# Patient Record
Sex: MALE | Race: WHITE | NOT HISPANIC OR LATINO
[De-identification: names, ages, dates, MRNs, and addresses within clinical notes are randomized per-mention and may not be internally consistent; named-entity substitution may affect disease eponyms.]

---

## 2017-09-24 ENCOUNTER — TRANSCRIPTION ENCOUNTER (OUTPATIENT)
Age: 27
End: 2017-09-24

## 2018-03-31 ENCOUNTER — EMERGENCY (EMERGENCY)
Facility: HOSPITAL | Age: 28
LOS: 1 days | Discharge: HOME | End: 2018-03-31
Attending: EMERGENCY MEDICINE
Payer: SELF-PAY

## 2018-03-31 VITALS
HEART RATE: 85 BPM | OXYGEN SATURATION: 99 % | SYSTOLIC BLOOD PRESSURE: 137 MMHG | WEIGHT: 220.02 LBS | TEMPERATURE: 98 F | HEIGHT: 73 IN | DIASTOLIC BLOOD PRESSURE: 83 MMHG | RESPIRATION RATE: 16 BRPM

## 2018-03-31 PROCEDURE — 73000 X-RAY EXAM OF COLLAR BONE: CPT | Mod: 26,RT

## 2018-03-31 PROCEDURE — 73070 X-RAY EXAM OF ELBOW: CPT | Mod: 26,RT

## 2018-03-31 PROCEDURE — 73030 X-RAY EXAM OF SHOULDER: CPT

## 2018-03-31 PROCEDURE — 73130 X-RAY EXAM OF HAND: CPT | Mod: 26,RT

## 2018-03-31 PROCEDURE — 99284 EMERGENCY DEPT VISIT MOD MDM: CPT | Mod: 25

## 2018-03-31 PROCEDURE — 73110 X-RAY EXAM OF WRIST: CPT | Mod: 26,RT

## 2018-03-31 PROCEDURE — 73110 X-RAY EXAM OF WRIST: CPT

## 2018-03-31 PROCEDURE — 73130 X-RAY EXAM OF HAND: CPT

## 2018-03-31 PROCEDURE — 99053 MED SERV 10PM-8AM 24 HR FAC: CPT

## 2018-03-31 PROCEDURE — 73030 X-RAY EXAM OF SHOULDER: CPT | Mod: 26,RT

## 2018-03-31 PROCEDURE — 99283 EMERGENCY DEPT VISIT LOW MDM: CPT | Mod: 25

## 2018-03-31 PROCEDURE — 73000 X-RAY EXAM OF COLLAR BONE: CPT

## 2018-03-31 PROCEDURE — 73070 X-RAY EXAM OF ELBOW: CPT

## 2018-03-31 RX ORDER — OXYCODONE AND ACETAMINOPHEN 5; 325 MG/1; MG/1
1 TABLET ORAL ONCE
Qty: 0 | Refills: 0 | Status: DISCONTINUED | OUTPATIENT
Start: 2018-03-31 | End: 2018-03-31

## 2018-03-31 RX ADMIN — OXYCODONE AND ACETAMINOPHEN 1 TABLET(S): 5; 325 TABLET ORAL at 23:16

## 2018-03-31 RX ADMIN — OXYCODONE AND ACETAMINOPHEN 1 TABLET(S): 5; 325 TABLET ORAL at 23:19

## 2018-03-31 NOTE — ED ADULT TRIAGE NOTE - CHIEF COMPLAINT QUOTE
c/o rt collar bone/wrist/ pain and rt. hand swelling s/p fall tonight 2 hrs ago  pt slipped and fell pt  denies any LOC

## 2018-04-01 VITALS
RESPIRATION RATE: 17 BRPM | DIASTOLIC BLOOD PRESSURE: 85 MMHG | SYSTOLIC BLOOD PRESSURE: 137 MMHG | OXYGEN SATURATION: 99 % | HEART RATE: 88 BPM

## 2018-04-01 NOTE — ED PROVIDER NOTE - PHYSICAL EXAMINATION
echymosis over right lateral clavicle. mild swelling to hand. tenderness to palpation over clavicle shoulder elbow wrist and hand. 2+ DP pulse. good capillary refill. no rib tenderness to palpation. no cervical spinal tenderness to palpation. patient reports subjective tingling to entire hand

## 2018-04-01 NOTE — ED PROVIDER NOTE - OBJECTIVE STATEMENT
patient slipped and fell 3 hours prior to Emergency Department arrival to hit the corner or the wall with his right side, especially the clavicle. had some pain initially, then after several hours pain got much worse and he noticed swelling to hand. now entire right side hurts. took advil prior to Emergency Department arrival.

## 2018-04-01 NOTE — ED PROVIDER NOTE - CARE PLAN
Principal Discharge DX:	Shoulder contusion  Secondary Diagnosis:	Clavicle pain  Secondary Diagnosis:	Hand swelling

## 2018-04-01 NOTE — ED PROVIDER NOTE - MEDICAL DECISION MAKING DETAILS
patient with fall with right sided pain. Xrays appear normal. images reviewed with radiology who did not see any fx or dislocation. home with shoulder sling, orthopedics followup if pain continues

## 2018-04-05 ENCOUNTER — APPOINTMENT (OUTPATIENT)
Dept: ORTHOPEDIC SURGERY | Facility: CLINIC | Age: 28
End: 2018-04-05
Payer: COMMERCIAL

## 2018-04-05 ENCOUNTER — TRANSCRIPTION ENCOUNTER (OUTPATIENT)
Age: 28
End: 2018-04-05

## 2018-04-05 VITALS — SYSTOLIC BLOOD PRESSURE: 111 MMHG | HEART RATE: 84 BPM | DIASTOLIC BLOOD PRESSURE: 71 MMHG

## 2018-04-05 VITALS — WEIGHT: 225 LBS | BODY MASS INDEX: 29.82 KG/M2 | HEIGHT: 73 IN

## 2018-04-05 DIAGNOSIS — Z78.9 OTHER SPECIFIED HEALTH STATUS: ICD-10-CM

## 2018-04-05 DIAGNOSIS — S49.91XA UNSPECIFIED INJURY OF RIGHT SHOULDER AND UPPER ARM, INITIAL ENCOUNTER: ICD-10-CM

## 2018-04-05 PROCEDURE — 99204 OFFICE O/P NEW MOD 45 MIN: CPT

## 2018-04-05 RX ORDER — DICLOFENAC SODIUM 75 MG/1
75 TABLET, DELAYED RELEASE ORAL
Qty: 60 | Refills: 0 | Status: ACTIVE | COMMUNITY
Start: 2018-04-05 | End: 1900-01-01

## 2018-04-19 ENCOUNTER — APPOINTMENT (OUTPATIENT)
Dept: ORTHOPEDIC SURGERY | Facility: CLINIC | Age: 28
End: 2018-04-19
Payer: COMMERCIAL

## 2018-04-19 VITALS
DIASTOLIC BLOOD PRESSURE: 85 MMHG | HEIGHT: 74 IN | HEART RATE: 108 BPM | WEIGHT: 225 LBS | SYSTOLIC BLOOD PRESSURE: 132 MMHG | BODY MASS INDEX: 28.88 KG/M2

## 2018-04-19 DIAGNOSIS — S49.91XD UNSPECIFIED INJURY OF RIGHT SHOULDER AND UPPER ARM, SUBSEQUENT ENCOUNTER: ICD-10-CM

## 2018-04-19 PROCEDURE — 99213 OFFICE O/P EST LOW 20 MIN: CPT

## 2018-04-19 RX ORDER — AMOXICILLIN AND CLAVULANATE POTASSIUM 500; 125 MG/1; MG/1
500-125 TABLET, FILM COATED ORAL
Qty: 21 | Refills: 0 | Status: ACTIVE | COMMUNITY
Start: 2017-12-27

## 2018-04-19 RX ORDER — IBUPROFEN 600 MG/1
600 TABLET, FILM COATED ORAL
Qty: 21 | Refills: 0 | Status: ACTIVE | COMMUNITY
Start: 2018-01-08

## 2018-04-19 RX ORDER — CHLORHEXIDINE GLUCONATE, 0.12% ORAL RINSE 1.2 MG/ML
0.12 SOLUTION DENTAL
Qty: 473 | Refills: 0 | Status: ACTIVE | COMMUNITY
Start: 2017-12-27

## 2018-04-19 RX ORDER — AMOXICILLIN 875 MG/1
875 TABLET, FILM COATED ORAL
Qty: 14 | Refills: 0 | Status: ACTIVE | COMMUNITY
Start: 2018-01-08

## 2018-05-10 ENCOUNTER — APPOINTMENT (OUTPATIENT)
Dept: ORTHOPEDIC SURGERY | Facility: CLINIC | Age: 28
End: 2018-05-10

## 2018-08-30 ENCOUNTER — TRANSCRIPTION ENCOUNTER (OUTPATIENT)
Age: 28
End: 2018-08-30

## 2020-04-30 NOTE — ED ADULT NURSE NOTE - NSSISCREENINGQ1_ED_A_ED
"Patient  agreed to switch to a video visit and to also send in photos beforehand.    Spoke to Maria C and reviewed the following info:    Upcoming Dermatology Visit Information       Due to the coronavirus pandemic, we are limiting all our non life threatening appointments to either telephone or video visits    These photos will be seen by an MD or CAROLINA and will need to be taken with 72 hours of your visit    Photos that you send in do need to include: date and time taken, area of body, and physical address (ex. Home address, for insurance purposes)    This will be billed to you and your insurance.    A teledermatology visit is not as thorough as an in-person visit and the quality of the photograph sent may not be of the same quality as that taken by the dermatology clinic.    You may receive a call from the clinic to review additional history and you may still be instructed to come to clinic even after photo review and be billed for both visits.    Please, seek emergency care if you have skin pain, fever, or sores in the mouth, nose, eyes, or genitals by dialing 911 or visiting an emergency room    Final photo assessment can take up to approximately 72 hours    Please, contact your clinic if you have not heard from us within 72 hours of your appointment time    Video visit requires: smart phone ( have to download \"AW Touchpoint\" misty. Or Via Computer/Laptop that has a microphone, camera, and speaker (Doctor will send link via e-mail)    For video visits, to receive an invitation and connect with your provider, the [facility] video visit technology must be accessible from your smartphone or personal device. Please click the link below for setup instructions:  ?  [email or web address]    Either option would require a nurse to call you approximately 30 min before your appointment to ask questions like medications, allergies, and pharmacy.         Who should I call with questions?    [facility], " [place]: [phone]    If this is a medical emergency and you are unable to reach an ER, Call 911     Zena Greenberg, EMT      No

## 2020-12-10 ENCOUNTER — TRANSCRIPTION ENCOUNTER (OUTPATIENT)
Age: 30
End: 2020-12-10

## 2020-12-11 ENCOUNTER — EMERGENCY (EMERGENCY)
Facility: HOSPITAL | Age: 30
LOS: 1 days | Discharge: HOME | End: 2020-12-11
Attending: STUDENT IN AN ORGANIZED HEALTH CARE EDUCATION/TRAINING PROGRAM
Payer: COMMERCIAL

## 2020-12-11 ENCOUNTER — TRANSCRIPTION ENCOUNTER (OUTPATIENT)
Age: 30
End: 2020-12-11

## 2020-12-11 VITALS
TEMPERATURE: 98 F | HEART RATE: 103 BPM | OXYGEN SATURATION: 95 % | DIASTOLIC BLOOD PRESSURE: 75 MMHG | SYSTOLIC BLOOD PRESSURE: 121 MMHG | RESPIRATION RATE: 20 BRPM

## 2020-12-11 VITALS
WEIGHT: 235.01 LBS | HEART RATE: 117 BPM | SYSTOLIC BLOOD PRESSURE: 130 MMHG | DIASTOLIC BLOOD PRESSURE: 78 MMHG | TEMPERATURE: 100 F | HEIGHT: 73 IN | OXYGEN SATURATION: 97 % | RESPIRATION RATE: 22 BRPM

## 2020-12-11 LAB
ALBUMIN SERPL ELPH-MCNC: 3.9 G/DL — SIGNIFICANT CHANGE UP (ref 3.5–5)
ALP SERPL-CCNC: 89 U/L — SIGNIFICANT CHANGE UP (ref 40–120)
ALT FLD-CCNC: 76 U/L DA — HIGH (ref 10–60)
ANION GAP SERPL CALC-SCNC: 8 MMOL/L — SIGNIFICANT CHANGE UP (ref 5–17)
APTT BLD: 35.7 SEC — HIGH (ref 27.5–35.5)
AST SERPL-CCNC: 40 U/L — SIGNIFICANT CHANGE UP (ref 10–40)
BASOPHILS # BLD AUTO: 0.02 K/UL — SIGNIFICANT CHANGE UP (ref 0–0.2)
BASOPHILS NFR BLD AUTO: 0.5 % — SIGNIFICANT CHANGE UP (ref 0–2)
BILIRUB SERPL-MCNC: 0.6 MG/DL — SIGNIFICANT CHANGE UP (ref 0.2–1.2)
BUN SERPL-MCNC: 14 MG/DL — SIGNIFICANT CHANGE UP (ref 7–18)
CALCIUM SERPL-MCNC: 9.6 MG/DL — SIGNIFICANT CHANGE UP (ref 8.4–10.5)
CHLORIDE SERPL-SCNC: 106 MMOL/L — SIGNIFICANT CHANGE UP (ref 96–108)
CO2 SERPL-SCNC: 24 MMOL/L — SIGNIFICANT CHANGE UP (ref 22–31)
CREAT SERPL-MCNC: 0.88 MG/DL — SIGNIFICANT CHANGE UP (ref 0.5–1.3)
D DIMER BLD IA.RAPID-MCNC: <150 NG/ML DDU — SIGNIFICANT CHANGE UP
EOSINOPHIL # BLD AUTO: 0.07 K/UL — SIGNIFICANT CHANGE UP (ref 0–0.5)
EOSINOPHIL NFR BLD AUTO: 1.7 % — SIGNIFICANT CHANGE UP (ref 0–6)
GLUCOSE SERPL-MCNC: 103 MG/DL — HIGH (ref 70–99)
HCT VFR BLD CALC: 45.4 % — SIGNIFICANT CHANGE UP (ref 39–50)
HGB BLD-MCNC: 15.3 G/DL — SIGNIFICANT CHANGE UP (ref 13–17)
IMM GRANULOCYTES NFR BLD AUTO: 0.5 % — SIGNIFICANT CHANGE UP (ref 0–1.5)
INR BLD: 1.08 RATIO — SIGNIFICANT CHANGE UP (ref 0.88–1.16)
LYMPHOCYTES # BLD AUTO: 1.16 K/UL — SIGNIFICANT CHANGE UP (ref 1–3.3)
LYMPHOCYTES # BLD AUTO: 27.4 % — SIGNIFICANT CHANGE UP (ref 13–44)
MCHC RBC-ENTMCNC: 28.3 PG — SIGNIFICANT CHANGE UP (ref 27–34)
MCHC RBC-ENTMCNC: 33.7 GM/DL — SIGNIFICANT CHANGE UP (ref 32–36)
MCV RBC AUTO: 83.9 FL — SIGNIFICANT CHANGE UP (ref 80–100)
MONOCYTES # BLD AUTO: 0.38 K/UL — SIGNIFICANT CHANGE UP (ref 0–0.9)
MONOCYTES NFR BLD AUTO: 9 % — SIGNIFICANT CHANGE UP (ref 2–14)
NEUTROPHILS # BLD AUTO: 2.58 K/UL — SIGNIFICANT CHANGE UP (ref 1.8–7.4)
NEUTROPHILS NFR BLD AUTO: 60.9 % — SIGNIFICANT CHANGE UP (ref 43–77)
NRBC # BLD: 0 /100 WBCS — SIGNIFICANT CHANGE UP (ref 0–0)
PLATELET # BLD AUTO: 231 K/UL — SIGNIFICANT CHANGE UP (ref 150–400)
POTASSIUM SERPL-MCNC: 3.9 MMOL/L — SIGNIFICANT CHANGE UP (ref 3.5–5.3)
POTASSIUM SERPL-SCNC: 3.9 MMOL/L — SIGNIFICANT CHANGE UP (ref 3.5–5.3)
PROT SERPL-MCNC: 8.1 G/DL — SIGNIFICANT CHANGE UP (ref 6–8.3)
PROTHROM AB SERPL-ACNC: 12.8 SEC — SIGNIFICANT CHANGE UP (ref 10.6–13.6)
RBC # BLD: 5.41 M/UL — SIGNIFICANT CHANGE UP (ref 4.2–5.8)
RBC # FLD: 13.5 % — SIGNIFICANT CHANGE UP (ref 10.3–14.5)
SODIUM SERPL-SCNC: 138 MMOL/L — SIGNIFICANT CHANGE UP (ref 135–145)
TROPONIN I SERPL-MCNC: <0.015 NG/ML — SIGNIFICANT CHANGE UP (ref 0–0.04)
WBC # BLD: 4.23 K/UL — SIGNIFICANT CHANGE UP (ref 3.8–10.5)
WBC # FLD AUTO: 4.23 K/UL — SIGNIFICANT CHANGE UP (ref 3.8–10.5)

## 2020-12-11 PROCEDURE — 85025 COMPLETE CBC W/AUTO DIFF WBC: CPT

## 2020-12-11 PROCEDURE — 80053 COMPREHEN METABOLIC PANEL: CPT

## 2020-12-11 PROCEDURE — 93005 ELECTROCARDIOGRAM TRACING: CPT

## 2020-12-11 PROCEDURE — 99284 EMERGENCY DEPT VISIT MOD MDM: CPT

## 2020-12-11 PROCEDURE — 99284 EMERGENCY DEPT VISIT MOD MDM: CPT | Mod: 25

## 2020-12-11 PROCEDURE — 71045 X-RAY EXAM CHEST 1 VIEW: CPT | Mod: 26

## 2020-12-11 PROCEDURE — 36415 COLL VENOUS BLD VENIPUNCTURE: CPT

## 2020-12-11 PROCEDURE — 85730 THROMBOPLASTIN TIME PARTIAL: CPT

## 2020-12-11 PROCEDURE — 85379 FIBRIN DEGRADATION QUANT: CPT

## 2020-12-11 PROCEDURE — 96375 TX/PRO/DX INJ NEW DRUG ADDON: CPT

## 2020-12-11 PROCEDURE — 84484 ASSAY OF TROPONIN QUANT: CPT

## 2020-12-11 PROCEDURE — 93010 ELECTROCARDIOGRAM REPORT: CPT

## 2020-12-11 PROCEDURE — 71045 X-RAY EXAM CHEST 1 VIEW: CPT

## 2020-12-11 PROCEDURE — 85610 PROTHROMBIN TIME: CPT

## 2020-12-11 PROCEDURE — 96374 THER/PROPH/DIAG INJ IV PUSH: CPT

## 2020-12-11 RX ORDER — DEXAMETHASONE 0.5 MG/5ML
10 ELIXIR ORAL ONCE
Refills: 0 | Status: COMPLETED | OUTPATIENT
Start: 2020-12-11 | End: 2020-12-11

## 2020-12-11 RX ORDER — IBUPROFEN 200 MG
1 TABLET ORAL
Qty: 20 | Refills: 0
Start: 2020-12-11

## 2020-12-11 RX ORDER — ACETAMINOPHEN 500 MG
650 TABLET ORAL ONCE
Refills: 0 | Status: COMPLETED | OUTPATIENT
Start: 2020-12-11 | End: 2020-12-11

## 2020-12-11 RX ORDER — ACETAMINOPHEN 500 MG
2 TABLET ORAL
Qty: 40 | Refills: 0
Start: 2020-12-11

## 2020-12-11 RX ORDER — KETOROLAC TROMETHAMINE 30 MG/ML
30 SYRINGE (ML) INJECTION ONCE
Refills: 0 | Status: DISCONTINUED | OUTPATIENT
Start: 2020-12-11 | End: 2020-12-11

## 2020-12-11 RX ADMIN — Medication 650 MILLIGRAM(S): at 18:15

## 2020-12-11 RX ADMIN — Medication 30 MILLIGRAM(S): at 18:15

## 2020-12-11 RX ADMIN — Medication 650 MILLIGRAM(S): at 19:11

## 2020-12-11 RX ADMIN — Medication 10 MILLIGRAM(S): at 19:25

## 2020-12-11 RX ADMIN — Medication 30 MILLIGRAM(S): at 19:11

## 2020-12-11 NOTE — ED PROVIDER NOTE - PROGRESS NOTE DETAILS
patient lab wnl. dimer negative. cxr reviewed, no ptx noted, lung changes consistent with covid. given med, return precaution and instructed to f.u pmd

## 2020-12-11 NOTE — ED PROVIDER NOTE - CLINICAL SUMMARY MEDICAL DECISION MAKING FREE TEXT BOX
Patient presenting for cough, fever, body ache, also endorses cp and sob. vital sig for tachycardia. has known covid infection. will obtain lab, cxr, pain med. assess for acs, pe, ptx, ed obs and reassess

## 2020-12-11 NOTE — ED PROVIDER NOTE - PATIENT PORTAL LINK FT
You can access the FollowMyHealth Patient Portal offered by [facility] by registering at the following website: [email or web address]. By joining [facility]’s FollowMyHealth portal, you will also be able to view your health information using other applications (apps) compatible with our system.

## 2020-12-11 NOTE — ED ADULT NURSE NOTE - OBJECTIVE STATEMENT
As per pt, c/o sharp generalized CP radiating to the back with SOB, f/c, cough, H/A, dizziness, blurred vision, lightheadedness x4 days. Pt admits to testing +covid 12/11/2020 at .

## 2020-12-11 NOTE — ED PROVIDER NOTE - OBJECTIVE STATEMENT
31 y/o M patient with no significant PMHx and no significant PSHx presents to ED with c/o 5d of fever, cough, chest pain, body aches, and shortness of breath. Patient states being a tested for COVID recently and was found to be COVID positive yesterday. Patient denies any nausea, vomiting or any other complains. Patient reports that the symptoms have been intermittently. Patient states taking Dayquil, Nyquil, and tylenol for the symptoms.

## 2020-12-17 ENCOUNTER — INPATIENT (INPATIENT)
Facility: HOSPITAL | Age: 30
LOS: 8 days | Discharge: HOME | DRG: 177 | End: 2020-12-26
Attending: INTERNAL MEDICINE | Admitting: INTERNAL MEDICINE
Payer: COMMERCIAL

## 2020-12-17 VITALS
WEIGHT: 235.01 LBS | RESPIRATION RATE: 16 BRPM | DIASTOLIC BLOOD PRESSURE: 86 MMHG | SYSTOLIC BLOOD PRESSURE: 132 MMHG | HEART RATE: 77 BPM | HEIGHT: 73 IN | TEMPERATURE: 98 F | OXYGEN SATURATION: 95 %

## 2020-12-17 DIAGNOSIS — R07.9 CHEST PAIN, UNSPECIFIED: ICD-10-CM

## 2020-12-17 DIAGNOSIS — U07.1 COVID-19: ICD-10-CM

## 2020-12-17 DIAGNOSIS — K92.1 MELENA: ICD-10-CM

## 2020-12-17 DIAGNOSIS — Z29.9 ENCOUNTER FOR PROPHYLACTIC MEASURES, UNSPECIFIED: ICD-10-CM

## 2020-12-17 PROBLEM — Z78.9 OTHER SPECIFIED HEALTH STATUS: Chronic | Status: ACTIVE | Noted: 2020-12-11

## 2020-12-17 LAB
ALBUMIN SERPL ELPH-MCNC: 3.6 G/DL — SIGNIFICANT CHANGE UP (ref 3.5–5)
ALP SERPL-CCNC: 81 U/L — SIGNIFICANT CHANGE UP (ref 40–120)
ALT FLD-CCNC: 66 U/L DA — HIGH (ref 10–60)
ANION GAP SERPL CALC-SCNC: 8 MMOL/L — SIGNIFICANT CHANGE UP (ref 5–17)
AST SERPL-CCNC: 32 U/L — SIGNIFICANT CHANGE UP (ref 10–40)
BASOPHILS # BLD AUTO: 0.01 K/UL — SIGNIFICANT CHANGE UP (ref 0–0.2)
BASOPHILS NFR BLD AUTO: 0.1 % — SIGNIFICANT CHANGE UP (ref 0–2)
BILIRUB SERPL-MCNC: 0.6 MG/DL — SIGNIFICANT CHANGE UP (ref 0.2–1.2)
BUN SERPL-MCNC: 12 MG/DL — SIGNIFICANT CHANGE UP (ref 7–18)
CALCIUM SERPL-MCNC: 9.5 MG/DL — SIGNIFICANT CHANGE UP (ref 8.4–10.5)
CHLORIDE SERPL-SCNC: 107 MMOL/L — SIGNIFICANT CHANGE UP (ref 96–108)
CO2 SERPL-SCNC: 25 MMOL/L — SIGNIFICANT CHANGE UP (ref 22–31)
CREAT SERPL-MCNC: 0.9 MG/DL — SIGNIFICANT CHANGE UP (ref 0.5–1.3)
D DIMER BLD IA.RAPID-MCNC: <150 NG/ML DDU — SIGNIFICANT CHANGE UP
EOSINOPHIL # BLD AUTO: 0.15 K/UL — SIGNIFICANT CHANGE UP (ref 0–0.5)
EOSINOPHIL NFR BLD AUTO: 2 % — SIGNIFICANT CHANGE UP (ref 0–6)
FERRITIN SERPL-MCNC: 811 NG/ML — HIGH (ref 30–400)
GLUCOSE SERPL-MCNC: 117 MG/DL — HIGH (ref 70–99)
HCT VFR BLD CALC: 43.9 % — SIGNIFICANT CHANGE UP (ref 39–50)
HGB BLD-MCNC: 14.7 G/DL — SIGNIFICANT CHANGE UP (ref 13–17)
IMM GRANULOCYTES NFR BLD AUTO: 0.4 % — SIGNIFICANT CHANGE UP (ref 0–1.5)
LYMPHOCYTES # BLD AUTO: 2.34 K/UL — SIGNIFICANT CHANGE UP (ref 1–3.3)
LYMPHOCYTES # BLD AUTO: 31.2 % — SIGNIFICANT CHANGE UP (ref 13–44)
MCHC RBC-ENTMCNC: 28.3 PG — SIGNIFICANT CHANGE UP (ref 27–34)
MCHC RBC-ENTMCNC: 33.5 GM/DL — SIGNIFICANT CHANGE UP (ref 32–36)
MCV RBC AUTO: 84.6 FL — SIGNIFICANT CHANGE UP (ref 80–100)
MONOCYTES # BLD AUTO: 0.46 K/UL — SIGNIFICANT CHANGE UP (ref 0–0.9)
MONOCYTES NFR BLD AUTO: 6.1 % — SIGNIFICANT CHANGE UP (ref 2–14)
NEUTROPHILS # BLD AUTO: 4.51 K/UL — SIGNIFICANT CHANGE UP (ref 1.8–7.4)
NEUTROPHILS NFR BLD AUTO: 60.2 % — SIGNIFICANT CHANGE UP (ref 43–77)
NRBC # BLD: 0 /100 WBCS — SIGNIFICANT CHANGE UP (ref 0–0)
PLATELET # BLD AUTO: 291 K/UL — SIGNIFICANT CHANGE UP (ref 150–400)
POTASSIUM SERPL-MCNC: 3.9 MMOL/L — SIGNIFICANT CHANGE UP (ref 3.5–5.3)
POTASSIUM SERPL-SCNC: 3.9 MMOL/L — SIGNIFICANT CHANGE UP (ref 3.5–5.3)
PROT SERPL-MCNC: 7.8 G/DL — SIGNIFICANT CHANGE UP (ref 6–8.3)
RAPID RVP RESULT: DETECTED
RBC # BLD: 5.19 M/UL — SIGNIFICANT CHANGE UP (ref 4.2–5.8)
RBC # FLD: 13 % — SIGNIFICANT CHANGE UP (ref 10.3–14.5)
SARS-COV-2 RNA SPEC QL NAA+PROBE: DETECTED
SODIUM SERPL-SCNC: 140 MMOL/L — SIGNIFICANT CHANGE UP (ref 135–145)
TROPONIN I SERPL-MCNC: <0.015 NG/ML — SIGNIFICANT CHANGE UP (ref 0–0.04)
TROPONIN I SERPL-MCNC: <0.015 NG/ML — SIGNIFICANT CHANGE UP (ref 0–0.04)
WBC # BLD: 7.5 K/UL — SIGNIFICANT CHANGE UP (ref 3.8–10.5)
WBC # FLD AUTO: 7.5 K/UL — SIGNIFICANT CHANGE UP (ref 3.8–10.5)

## 2020-12-17 PROCEDURE — 71045 X-RAY EXAM CHEST 1 VIEW: CPT | Mod: 26

## 2020-12-17 PROCEDURE — 93010 ELECTROCARDIOGRAM REPORT: CPT | Mod: 76

## 2020-12-17 PROCEDURE — 99285 EMERGENCY DEPT VISIT HI MDM: CPT

## 2020-12-17 RX ORDER — ENOXAPARIN SODIUM 100 MG/ML
40 INJECTION SUBCUTANEOUS DAILY
Refills: 0 | Status: DISCONTINUED | OUTPATIENT
Start: 2020-12-17 | End: 2020-12-26

## 2020-12-17 RX ORDER — IPRATROPIUM/ALBUTEROL SULFATE 18-103MCG
3 AEROSOL WITH ADAPTER (GRAM) INHALATION ONCE
Refills: 0 | Status: COMPLETED | OUTPATIENT
Start: 2020-12-17 | End: 2020-12-17

## 2020-12-17 RX ORDER — KETOROLAC TROMETHAMINE 30 MG/ML
30 SYRINGE (ML) INJECTION ONCE
Refills: 0 | Status: DISCONTINUED | OUTPATIENT
Start: 2020-12-17 | End: 2020-12-17

## 2020-12-17 RX ORDER — MONTELUKAST 4 MG/1
10 TABLET, CHEWABLE ORAL EVERY 24 HOURS
Refills: 0 | Status: DISCONTINUED | OUTPATIENT
Start: 2020-12-17 | End: 2020-12-23

## 2020-12-17 RX ORDER — CHOLECALCIFEROL (VITAMIN D3) 125 MCG
1000 CAPSULE ORAL DAILY
Refills: 0 | Status: DISCONTINUED | OUTPATIENT
Start: 2020-12-17 | End: 2020-12-26

## 2020-12-17 RX ORDER — ZINC SULFATE TAB 220 MG (50 MG ZINC EQUIVALENT) 220 (50 ZN) MG
220 TAB ORAL DAILY
Refills: 0 | Status: DISCONTINUED | OUTPATIENT
Start: 2020-12-17 | End: 2020-12-26

## 2020-12-17 RX ORDER — FAMOTIDINE 10 MG/ML
40 INJECTION INTRAVENOUS ONCE
Refills: 0 | Status: COMPLETED | OUTPATIENT
Start: 2020-12-17 | End: 2020-12-17

## 2020-12-17 RX ORDER — AZITHROMYCIN 500 MG/1
TABLET, FILM COATED ORAL
Refills: 0 | Status: DISCONTINUED | OUTPATIENT
Start: 2020-12-17 | End: 2020-12-19

## 2020-12-17 RX ORDER — ASCORBIC ACID 60 MG
1000 TABLET,CHEWABLE ORAL DAILY
Refills: 0 | Status: DISCONTINUED | OUTPATIENT
Start: 2020-12-17 | End: 2020-12-26

## 2020-12-17 RX ORDER — AZITHROMYCIN 500 MG/1
500 TABLET, FILM COATED ORAL EVERY 24 HOURS
Refills: 0 | Status: DISCONTINUED | OUTPATIENT
Start: 2020-12-18 | End: 2020-12-19

## 2020-12-17 RX ORDER — AZITHROMYCIN 500 MG/1
500 TABLET, FILM COATED ORAL ONCE
Refills: 0 | Status: COMPLETED | OUTPATIENT
Start: 2020-12-17 | End: 2020-12-17

## 2020-12-17 RX ORDER — ASCORBIC ACID 60 MG
500 TABLET,CHEWABLE ORAL DAILY
Refills: 0 | Status: DISCONTINUED | OUTPATIENT
Start: 2020-12-17 | End: 2020-12-17

## 2020-12-17 RX ORDER — FAMOTIDINE 10 MG/ML
40 INJECTION INTRAVENOUS
Refills: 0 | Status: DISCONTINUED | OUTPATIENT
Start: 2020-12-17 | End: 2020-12-19

## 2020-12-17 RX ORDER — ALBUTEROL 90 UG/1
2 AEROSOL, METERED ORAL EVERY 6 HOURS
Refills: 0 | Status: DISCONTINUED | OUTPATIENT
Start: 2020-12-17 | End: 2020-12-26

## 2020-12-17 RX ADMIN — Medication 1000 MILLIGRAM(S): at 20:27

## 2020-12-17 RX ADMIN — Medication 30 MILLIGRAM(S): at 14:28

## 2020-12-17 RX ADMIN — Medication 3 MILLILITER(S): at 14:28

## 2020-12-17 RX ADMIN — Medication 100 MILLIGRAM(S): at 19:49

## 2020-12-17 RX ADMIN — MONTELUKAST 10 MILLIGRAM(S): 4 TABLET, CHEWABLE ORAL at 20:27

## 2020-12-17 RX ADMIN — FAMOTIDINE 40 MILLIGRAM(S): 10 INJECTION INTRAVENOUS at 20:26

## 2020-12-17 RX ADMIN — AZITHROMYCIN 255 MILLIGRAM(S): 500 TABLET, FILM COATED ORAL at 20:26

## 2020-12-17 NOTE — ED PROVIDER NOTE - CARE PLAN
Principal Discharge DX:	COVID-19  Secondary Diagnosis:	SOB (shortness of breath) on exertion  Secondary Diagnosis:	Hemoptysis  Secondary Diagnosis:	Blood in stool  Secondary Diagnosis:	Chest pain

## 2020-12-17 NOTE — H&P ADULT - PROBLEM SELECTOR PLAN 4
RISK                                                          Points  [] Previous VTE                                           3  [] Thrombophilia                                        2  [] Lower limb paralysis                              2   [] Current Cancer                                       2   [x] Immobilization > 24 hrs                        1  [] ICU/CCU stay > 24 hours                       1  [ ] Age > 60                                                   1  Improve Score 1  Lovenox 40mg SQ

## 2020-12-17 NOTE — H&P ADULT - PROBLEM SELECTOR PLAN 2
Patient reports hematochezia sicne 1 day  - PRevious history of blood in stools years ago and he was found to have a polyp  - Also reports Hematemesis   - Epigastric pain with eating anf vomiting   - HnH stable,  -Patient has been taking Ibuprofen 600mg Q4 for last 1 week   - No melena  - Will monitor for now, Start on protonix   - Avoid NSAIDs   - GI consult Dr Murray

## 2020-12-17 NOTE — CONSULT NOTE ADULT - ASSESSMENT
Patient is a 30y old  Male who works in a [employer], was tested positive for covid 19 on 12/10/20 and now presenting with shortness of breath and blood in stool. Patient was seen in the ED on Friday with same symptoms and was discharged ,He is coming back with hematemesis, diarrhea and blood in stools. Patient endorses severe chest pain that is parasternal localized , increases with eating. Patient also endorses vomiting whenever he tries to eat and loss of appetite. Patient reports that he was saturating around 85% at home, however, in the hospital he is saturating 95% on RA and not in Respiratory distress.  Off note patient has history of constipation and was found to have polyp few years ago, On admission, he found to have no fever, no leukocytosis but tachycardia and positive COVID test. The CXR and Troponin is negative. The ID consult requested to assist with further management.       # COVID infection    would recommend:    1. Monitor oxygen saturation closely and start on IV dexamethasone 6 mg daily and Remdesivir if saturation fall 94 % or less  2. Supportive care  3. Please start on Famotidine and Montelucast   4. Continue Bronchodilator as needed  5. COVID precautions     will follow the patient with you and make further recommendation based on the clinical course and Lab results  Thank you for the opportunity to participate in Mr. GUNN's care      Attending Attestation:    Spent more than 65 minutes on total encounter, more than 50 % of the visit was spent counseling and/or coordinating care by the Attending physician.         Patient is a 30y old  Male who works in a [employer], was tested positive for covid 19 on 12/10/20 and now presenting with shortness of breath and blood in stool. Patient was seen in the ED on Friday with same symptoms and was discharged ,He is coming back with hematemesis, diarrhea and blood in stools. Patient endorses severe chest pain that is parasternal localized , increases with eating. Patient also endorses vomiting whenever he tries to eat and loss of appetite. Patient reports that he was saturating around 85% at home, however, in the hospital he is saturating 95% on RA and not in Respiratory distress.  Off note patient has history of constipation and was found to have polyp few years ago, On admission, he found to have no fever, no leukocytosis but tachycardia and positive COVID test. The CXR and Troponin is negative. The ID consult requested to assist with further management.     # COVID infection    would recommend:    1. Monitor oxygen saturation closely and start on IV dexamethasone 6 mg daily and Remdesivir if saturation fall 94 % or less  2. Supportive care  3. Please start on Famotidine and Montelucast   4. Continue Bronchodilator as needed  5. COVID precautions     d/w Nursing staff    will follow the patient with you and make further recommendation based on the clinical course and Lab results  Thank you for the opportunity to participate in Mr. GUNN's care      Attending Attestation:    Spent more than 65 minutes on total encounter, more than 50 % of the visit was spent counseling and/or coordinating care by the Attending physician.

## 2020-12-17 NOTE — ED ADULT NURSE NOTE - NSIMPLEMENTINTERV_GEN_ALL_ED
Implemented All Universal Safety Interventions:  [place] to call system. Call bell, personal items and telephone within reach. Instruct patient to call for assistance. Room bathroom lighting operational. Non-slip footwear when patient is off stretcher. Physically safe environment: no spills, clutter or unnecessary equipment. Stretcher in lowest position, wheels locked, appropriate side rails in place.

## 2020-12-17 NOTE — ED ADULT NURSE NOTE - OBJECTIVE STATEMENT
AOX4 +ambulatory patient reports patient tested positive for COVID-19 last 12/10 complaining of worsening chest pains, shortness of breath, hemoptysis, congestion, cough and blood in the stool. Patient been taking OTC medications with minimal relief.

## 2020-12-17 NOTE — H&P ADULT - HISTORY OF PRESENT ILLNESS
29 YO pt who works in a "[employer] was tested positive for covid 19 on 12/10/20 is presenting with shortness of breath and blood in stool. Patient was seen in the ED on friday with same symptoms and was discharged ,He is coming back with blood in stools. Diarrhea, constipation, abdominal pain,     ED Course:   Duoneb,   Toradol  Troponin negative x 2  CXR normal, no new changes   Vital Signs Last 24 Hrs  T(C): 37.1 (17 Dec 2020 15:29), Max: 37.1 (17 Dec 2020 15:29)  T(F): 98.8 (17 Dec 2020 15:29), Max: 98.8 (17 Dec 2020 15:29)  HR: 102 (17 Dec 2020 15:29) (77 - 102)  BP: 148/79 (17 Dec 2020 15:29) (132/86 - 148/79)  RR: 17 (17 Dec 2020 15:29) (16 - 17)  SpO2: 95% (17 Dec 2020 16:50) (95% - 97%) 29 YO pt who works in a [employer], was tested positive for covid 19 on 12/10/20 is presenting with shortness of breath and blood in stool. Patient was seen in the ED on friday with same symptoms and was discharged ,He is coming back with hematemesis, diarrhea and blood in stools. patient endorses severe chest pain that is parasternal localzid, increases with eating. Patient also endorses vomiting whenever he tries to eat and loss of appetite. Patient reports that he was saturating around 85% at home, however, in the hospital he is saturating 95% on RA and not in Respiratory distress.   Off note patient has history of constipation and was found to have polyp few years ago,     ED Course:  1 dose of  Duoneb and Toradol  Troponin negative x 2  CXR normal, no new changes   Vital Signs Last 24 Hrs  T(C): 37.1 (17 Dec 2020 15:29), Max: 37.1 (17 Dec 2020 15:29)  T(F): 98.8 (17 Dec 2020 15:29), Max: 98.8 (17 Dec 2020 15:29)  HR: 102 (17 Dec 2020 15:29) (77 - 102)  BP: 148/79 (17 Dec 2020 15:29) (132/86 - 148/79)  RR: 17 (17 Dec 2020 15:29) (16 - 17)  SpO2: 95% (17 Dec 2020 16:50) (95% - 97%)

## 2020-12-17 NOTE — PATIENT PROFILE ADULT - NSPRONUTRITIONRISK_GEN_A_NUR
Pt brought from home via [facility] with c/o abdominal pain and distention.   States that she has pain when she bends over.   Hx of liver failure   
No indicators present

## 2020-12-17 NOTE — H&P ADULT - PROBLEM SELECTOR PLAN 3
Patient has most likely musculoskeletal pain associated with severe cough   Located in mid sternal area, increases with cough   EKG Normal. Troponin negative,   No other risk factors  Will monitor for symptoms   Protonix, Tylenol for fever only

## 2020-12-17 NOTE — H&P ADULT - ASSESSMENT
29 YO pt who works in a [employer], was tested positive for covid 19 on 12/10/20 is presenting with shortness of breath and blood in stool. Patient is being admitted for Covid-19 [id number] and GI bleed

## 2020-12-17 NOTE — CONSULT NOTE ADULT - SUBJECTIVE AND OBJECTIVE BOX
Patient is a 30y old  Male who works in a [employer], was tested positive for covid 19 on 12/10/20 and now presenting with shortness of breath and blood in stool. Patient was seen in the ED on Friday with same symptoms and was discharged ,He is coming back with hematemesis, diarrhea and blood in stools. Patient endorses severe chest pain that is parasternal localized , increases with eating. Patient also endorses vomiting whenever he tries to eat and loss of appetite. Patient reports that he was saturating around 85% at home, however, in the hospital he is saturating 95% on RA and not in Respiratory distress.  Off note patient has history of constipation and was found to have polyp few years ago, On admission, he found to have no fever, no leukocytosis but tachycardia and positive COVID test. The CXR and Troponin is negative. The ID consult requested to assist with further management.       REVIEW OF SYSTEMS: Total of twelve systems have been reviewed  and found to be negative unless mentioned in HPI      PAST MEDICAL & SURGICAL HISTORY:  Colonic polyp  No pertinent past medical history        SOCIAL HISTORY  Alcohol: Does not drink  Tobacco: Does not smoke  Illicit substance use: None      FAMILY HISTORY: Non contributory to the present illness      ALLERGIES: No Known Allergies      PHYSICAL EXAM:  Vital Signs Last 24 Hrs  T(C): 37 (17 Dec 2020 18:53), Max: 37.1 (17 Dec 2020 15:29)  T(F): 98.6 (17 Dec 2020 18:53), Max: 98.8 (17 Dec 2020 15:29)  HR: 96 (17 Dec 2020 18:53) (77 - 111)  BP: 133/75 (17 Dec 2020 18:53) (114/70 - 148/79)  BP(mean): --  RR: 18 (17 Dec 2020 18:53) (16 - 18)  SpO2: 96% (17 Dec 2020 18:53) (93% - 97%)          LABS:                        14.7   7.50  )-----------( 291      ( 17 Dec 2020 14:29 )             43.9       12-17    140  |  107  |  12  ----------------------------<  117<H>  3.9   |  25  |  0.90    Ca    9.5      17 Dec 2020 14:29    TPro  7.8  /  Alb  3.6  /  TBili  0.6  /  DBili  x   /  AST  32  /  ALT  66<H>  /  AlkPhos  81  12-17        MEDICATIONS  (STANDING):  ascorbic acid 500 milliGRAM(s) Oral daily  cholecalciferol 1000 Unit(s) Oral daily  enoxaparin Injectable 40 milliGRAM(s) SubCutaneous daily  zinc sulfate 220 milliGRAM(s) Oral daily    MEDICATIONS  (PRN):  ALBUTerol    90 MICROgram(s) HFA Inhaler 2 Puff(s) Inhalation every 6 hours PRN Shortness of Breath  guaiFENesin   Syrup  (Sugar-Free) 100 milliGRAM(s) Oral every 6 hours PRN Cough        RADIOLOGY & ADDITIONAL TESTS:    < from: Xray Chest 1 View- PORTABLE-Urgent (12.17.20 @ 14:41) >  The lung fields and pleuralsurfaces are unremarkable.      MICROBIOLOGY DATA:    D-Dimer Assay, Quantitative (12.17.20 @ 14:29)  <150 ng/mL DDU   Respiratory Viral Panel with COVID-19 by JERRY (12.17.20 @ 15:11)   Rapid RVP Result: Detected   SARS-CoV-2: Detected:              Patient is a 30y old  Male who works in a [employer], was tested positive for covid 19 on 12/10/20 and now presenting with shortness of breath and blood in stool. Patient was seen in the ED on Friday with same symptoms and was discharged ,He is coming back with hematemesis, diarrhea and blood in stools. Patient endorses severe chest pain that is parasternal localized , increases with eating. Patient also endorses vomiting whenever he tries to eat and loss of appetite. Patient reports that he was saturating around 85% at home, however, in the hospital he is saturating 95% on RA and not in Respiratory distress.  Off note patient has history of constipation and was found to have polyp few years ago, On admission, he found to have no fever, no leukocytosis but tachycardia and positive COVID test. The CXR and Troponin is negative. The ID consult requested to assist with further management.       REVIEW OF SYSTEMS: Total of twelve systems have been reviewed  and found to be negative unless mentioned in HPI      PAST MEDICAL & SURGICAL HISTORY:  Colonic polyp  No pertinent past medical history        SOCIAL HISTORY  Alcohol: Does not drink  Tobacco: Does not smoke  Illicit substance use: None      FAMILY HISTORY: Non contributory to the present illness      ALLERGIES: No Known Allergies      PHYSICAL EXAM:  Vital Signs Last 24 Hrs  T(C): 37 (17 Dec 2020 18:53), Max: 37.1 (17 Dec 2020 15:29)  T(F): 98.6 (17 Dec 2020 18:53), Max: 98.8 (17 Dec 2020 15:29)  HR: 96 (17 Dec 2020 18:53) (77 - 111)  BP: 133/75 (17 Dec 2020 18:53) (114/70 - 148/79)  BP(mean): --  RR: 18 (17 Dec 2020 18:53) (16 - 18)  SpO2: 96% (17 Dec 2020 18:53) (93% - 97%)        LABS:                        14.7   7.50  )-----------( 291      ( 17 Dec 2020 14:29 )             43.9       12-17    140  |  107  |  12  ----------------------------<  117<H>  3.9   |  25  |  0.90    Ca    9.5      17 Dec 2020 14:29    TPro  7.8  /  Alb  3.6  /  TBili  0.6  /  DBili  x   /  AST  32  /  ALT  66<H>  /  AlkPhos  81  12-17        MEDICATIONS  (STANDING):  ascorbic acid 500 milliGRAM(s) Oral daily  cholecalciferol 1000 Unit(s) Oral daily  enoxaparin Injectable 40 milliGRAM(s) SubCutaneous daily  zinc sulfate 220 milliGRAM(s) Oral daily    MEDICATIONS  (PRN):  ALBUTerol    90 MICROgram(s) HFA Inhaler 2 Puff(s) Inhalation every 6 hours PRN Shortness of Breath  guaiFENesin   Syrup  (Sugar-Free) 100 milliGRAM(s) Oral every 6 hours PRN Cough        RADIOLOGY & ADDITIONAL TESTS:    < from: Xray Chest 1 View- PORTABLE-Urgent (12.17.20 @ 14:41) >  The lung fields and pleuralsurfaces are unremarkable.      MICROBIOLOGY DATA:    D-Dimer Assay, Quantitative (12.17.20 @ 14:29)  <150 ng/mL DDU   Respiratory Viral Panel with COVID-19 by JERRY (12.17.20 @ 15:11)   Rapid RVP Result: Detected   SARS-CoV-2: Detected:

## 2020-12-17 NOTE — H&P ADULT - PROBLEM SELECTOR PLAN 1
Patient is a known case of Covid-19 infection diagnosed on 12/10,   Coming with worsening cough, chest pain, hematemesis and hematochezia  desaturating to 85% at home, however, saturating well on room air in the hospital'  Not in respiratory distress  - CXR negative for acute changes  - D-Diemr <150 ,   - f/u covid markers   - Will start patient on Albuterol inhaler, Vitamin C , Vitamin D and Zinc for supportive treatment   - Robitussin for cough   - Supplemental Oxygen PRN   - Will discuss with Dr Singh for Remdesevir, currently patient does not qualify for Dexamethasone as he is not requiring any supplemental oxygen   - ID consult Dr Perea

## 2020-12-17 NOTE — ED PROVIDER NOTE - OBJECTIVE STATEMENT
pt who works in a [employer] tested positive for covid 19 at [facility] on 12/10/20.seen on friday treated ND RELEASE.NO SIG PAST MEDICAL HX.TODAY CO OF MORE SOB/ABD PAIN/PASSAGE OF BLOOD IN STOOL AND HEMOTYSIS/MORE FEVER AND CHILLS/CHEST PAIN

## 2020-12-17 NOTE — H&P ADULT - NSHPREVIEWOFSYSTEMS_GEN_ALL_CORE
CONSTITUTIONAL: No weakness, fevers or chills  EYES/ENT: No visual changes;  No vertigo or throat pain   NECK: No pain or stiffness  RESPIRATORY: No cough, wheezing, hemoptysis; No shortness of breath  CARDIOVASCULAR: No chest pain or palpitations  GASTROINTESTINAL: No abdominal or epigastric pain. No nausea, vomiting, or hematemesis; No diarrhea or constipation. No melena or hematochezia.  GENITOURINARY: No dysuria, frequency or hematuria  NEUROLOGICAL: No numbness or weakness  SKIN: No itching, burning, rashes, or lesions   All other review of systems is negative unless indicated above. CONSTITUTIONAL: weakness, No fevers or chills  EYES/ENT: No visual changes;  No vertigo or throat pain   NECK: No pain or stiffness  RESPIRATORY: No cough, wheezing, hemoptysis; No shortness of breath  CARDIOVASCULAR: chest pain, no palpitations  GASTROINTESTINAL:  epigastric pain. vomiting, hematemesis; diarrhea, hematochezia.  GENITOURINARY: No dysuria, frequency or hematuria  NEUROLOGICAL: No numbness or weakness  SKIN: No itching, burning, rashes, or lesions   All other review of systems is negative unless indicated above.

## 2020-12-18 LAB
24R-OH-CALCIDIOL SERPL-MCNC: 14.1 NG/ML — LOW (ref 30–80)
A1C WITH ESTIMATED AVERAGE GLUCOSE RESULT: 6.3 % — HIGH (ref 4–5.6)
ALBUMIN SERPL ELPH-MCNC: 3.1 G/DL — LOW (ref 3.5–5)
ALP SERPL-CCNC: 71 U/L — SIGNIFICANT CHANGE UP (ref 40–120)
ALT FLD-CCNC: 60 U/L DA — SIGNIFICANT CHANGE UP (ref 10–60)
ANION GAP SERPL CALC-SCNC: 8 MMOL/L — SIGNIFICANT CHANGE UP (ref 5–17)
AST SERPL-CCNC: 26 U/L — SIGNIFICANT CHANGE UP (ref 10–40)
BASOPHILS # BLD AUTO: 0.02 K/UL — SIGNIFICANT CHANGE UP (ref 0–0.2)
BASOPHILS NFR BLD AUTO: 0.3 % — SIGNIFICANT CHANGE UP (ref 0–2)
BILIRUB SERPL-MCNC: 0.7 MG/DL — SIGNIFICANT CHANGE UP (ref 0.2–1.2)
BUN SERPL-MCNC: 12 MG/DL — SIGNIFICANT CHANGE UP (ref 7–18)
CALCIUM SERPL-MCNC: 9 MG/DL — SIGNIFICANT CHANGE UP (ref 8.4–10.5)
CHLORIDE SERPL-SCNC: 106 MMOL/L — SIGNIFICANT CHANGE UP (ref 96–108)
CHOLEST SERPL-MCNC: 174 MG/DL — SIGNIFICANT CHANGE UP
CO2 SERPL-SCNC: 26 MMOL/L — SIGNIFICANT CHANGE UP (ref 22–31)
CREAT SERPL-MCNC: 0.84 MG/DL — SIGNIFICANT CHANGE UP (ref 0.5–1.3)
CRP SERPL-MCNC: 0.33 MG/DL — SIGNIFICANT CHANGE UP (ref 0–0.4)
EOSINOPHIL # BLD AUTO: 0.15 K/UL — SIGNIFICANT CHANGE UP (ref 0–0.5)
EOSINOPHIL NFR BLD AUTO: 2.4 % — SIGNIFICANT CHANGE UP (ref 0–6)
ERYTHROCYTE [SEDIMENTATION RATE] IN BLOOD: 23 MM/HR — HIGH (ref 0–15)
ESTIMATED AVERAGE GLUCOSE: 134 MG/DL — HIGH (ref 68–114)
FERRITIN SERPL-MCNC: 783 NG/ML — HIGH (ref 30–400)
FOLATE SERPL-MCNC: 13.7 NG/ML — SIGNIFICANT CHANGE UP
GLUCOSE SERPL-MCNC: 107 MG/DL — HIGH (ref 70–99)
HCT VFR BLD CALC: 39 % — SIGNIFICANT CHANGE UP (ref 39–50)
HDLC SERPL-MCNC: 28 MG/DL — LOW
HGB BLD-MCNC: 13.2 G/DL — SIGNIFICANT CHANGE UP (ref 13–17)
IMM GRANULOCYTES NFR BLD AUTO: 0.5 % — SIGNIFICANT CHANGE UP (ref 0–1.5)
LDH SERPL L TO P-CCNC: 176 U/L — SIGNIFICANT CHANGE UP (ref 120–225)
LIPID PNL WITH DIRECT LDL SERPL: 78 MG/DL — SIGNIFICANT CHANGE UP
LYMPHOCYTES # BLD AUTO: 2.55 K/UL — SIGNIFICANT CHANGE UP (ref 1–3.3)
LYMPHOCYTES # BLD AUTO: 41.1 % — SIGNIFICANT CHANGE UP (ref 13–44)
MAGNESIUM SERPL-MCNC: 2.1 MG/DL — SIGNIFICANT CHANGE UP (ref 1.6–2.6)
MCHC RBC-ENTMCNC: 29 PG — SIGNIFICANT CHANGE UP (ref 27–34)
MCHC RBC-ENTMCNC: 33.8 GM/DL — SIGNIFICANT CHANGE UP (ref 32–36)
MCV RBC AUTO: 85.7 FL — SIGNIFICANT CHANGE UP (ref 80–100)
MONOCYTES # BLD AUTO: 0.58 K/UL — SIGNIFICANT CHANGE UP (ref 0–0.9)
MONOCYTES NFR BLD AUTO: 9.3 % — SIGNIFICANT CHANGE UP (ref 2–14)
NEUTROPHILS # BLD AUTO: 2.88 K/UL — SIGNIFICANT CHANGE UP (ref 1.8–7.4)
NEUTROPHILS NFR BLD AUTO: 46.4 % — SIGNIFICANT CHANGE UP (ref 43–77)
NON HDL CHOLESTEROL: 146 MG/DL — HIGH
NRBC # BLD: 0 /100 WBCS — SIGNIFICANT CHANGE UP (ref 0–0)
PHOSPHATE SERPL-MCNC: 3.1 MG/DL — SIGNIFICANT CHANGE UP (ref 2.5–4.5)
PLATELET # BLD AUTO: 274 K/UL — SIGNIFICANT CHANGE UP (ref 150–400)
POTASSIUM SERPL-MCNC: 3.6 MMOL/L — SIGNIFICANT CHANGE UP (ref 3.5–5.3)
POTASSIUM SERPL-SCNC: 3.6 MMOL/L — SIGNIFICANT CHANGE UP (ref 3.5–5.3)
PROT SERPL-MCNC: 6.9 G/DL — SIGNIFICANT CHANGE UP (ref 6–8.3)
RBC # BLD: 4.55 M/UL — SIGNIFICANT CHANGE UP (ref 4.2–5.8)
RBC # FLD: 13.2 % — SIGNIFICANT CHANGE UP (ref 10.3–14.5)
SARS-COV-2 IGG SERPL QL IA: POSITIVE
SARS-COV-2 IGM SERPL IA-ACNC: 4.67 RATIO — HIGH
SODIUM SERPL-SCNC: 140 MMOL/L — SIGNIFICANT CHANGE UP (ref 135–145)
TRIGL SERPL-MCNC: 340 MG/DL — HIGH
TSH SERPL-MCNC: 1.64 UU/ML — SIGNIFICANT CHANGE UP (ref 0.34–4.82)
VIT B12 SERPL-MCNC: 615 PG/ML — SIGNIFICANT CHANGE UP (ref 232–1245)
WBC # BLD: 6.21 K/UL — SIGNIFICANT CHANGE UP (ref 3.8–10.5)
WBC # FLD AUTO: 6.21 K/UL — SIGNIFICANT CHANGE UP (ref 3.8–10.5)

## 2020-12-18 PROCEDURE — 71260 CT THORAX DX C+: CPT | Mod: 26

## 2020-12-18 PROCEDURE — 74177 CT ABD & PELVIS W/CONTRAST: CPT | Mod: 26

## 2020-12-18 RX ORDER — ONDANSETRON 8 MG/1
4 TABLET, FILM COATED ORAL EVERY 6 HOURS
Refills: 0 | Status: COMPLETED | OUTPATIENT
Start: 2020-12-18 | End: 2020-12-20

## 2020-12-18 RX ORDER — ACETAMINOPHEN 500 MG
650 TABLET ORAL EVERY 6 HOURS
Refills: 0 | Status: COMPLETED | OUTPATIENT
Start: 2020-12-18 | End: 2020-12-22

## 2020-12-18 RX ORDER — GEMFIBROZIL 600 MG
600 TABLET ORAL
Refills: 0 | Status: DISCONTINUED | OUTPATIENT
Start: 2020-12-18 | End: 2020-12-26

## 2020-12-18 RX ORDER — KETOROLAC TROMETHAMINE 30 MG/ML
15 SYRINGE (ML) INJECTION ONCE
Refills: 0 | Status: DISCONTINUED | OUTPATIENT
Start: 2020-12-18 | End: 2020-12-18

## 2020-12-18 RX ORDER — IOHEXOL 300 MG/ML
30 INJECTION, SOLUTION INTRAVENOUS ONCE
Refills: 0 | Status: COMPLETED | OUTPATIENT
Start: 2020-12-18 | End: 2020-12-18

## 2020-12-18 RX ORDER — ONDANSETRON 8 MG/1
4 TABLET, FILM COATED ORAL EVERY 6 HOURS
Refills: 0 | Status: DISCONTINUED | OUTPATIENT
Start: 2020-12-18 | End: 2020-12-18

## 2020-12-18 RX ADMIN — ENOXAPARIN SODIUM 40 MILLIGRAM(S): 100 INJECTION SUBCUTANEOUS at 11:11

## 2020-12-18 RX ADMIN — Medication 30 MILLILITER(S): at 21:09

## 2020-12-18 RX ADMIN — Medication 1000 MILLIGRAM(S): at 11:12

## 2020-12-18 RX ADMIN — Medication 15 MILLIGRAM(S): at 23:20

## 2020-12-18 RX ADMIN — ONDANSETRON 4 MILLIGRAM(S): 8 TABLET, FILM COATED ORAL at 22:35

## 2020-12-18 RX ADMIN — Medication 600 MILLIGRAM(S): at 17:31

## 2020-12-18 RX ADMIN — FAMOTIDINE 40 MILLIGRAM(S): 10 INJECTION INTRAVENOUS at 05:05

## 2020-12-18 RX ADMIN — ONDANSETRON 4 MILLIGRAM(S): 8 TABLET, FILM COATED ORAL at 11:11

## 2020-12-18 RX ADMIN — Medication 30 MILLILITER(S): at 01:42

## 2020-12-18 RX ADMIN — AZITHROMYCIN 255 MILLIGRAM(S): 500 TABLET, FILM COATED ORAL at 21:08

## 2020-12-18 RX ADMIN — ZINC SULFATE TAB 220 MG (50 MG ZINC EQUIVALENT) 220 MILLIGRAM(S): 220 (50 ZN) TAB at 11:11

## 2020-12-18 RX ADMIN — MONTELUKAST 10 MILLIGRAM(S): 4 TABLET, CHEWABLE ORAL at 21:08

## 2020-12-18 RX ADMIN — Medication 100 MILLIGRAM(S): at 01:42

## 2020-12-18 RX ADMIN — Medication 100 MILLIGRAM(S): at 21:26

## 2020-12-18 RX ADMIN — Medication 1000 UNIT(S): at 11:12

## 2020-12-18 RX ADMIN — Medication 15 MILLIGRAM(S): at 23:50

## 2020-12-18 RX ADMIN — Medication 30 MILLILITER(S): at 11:11

## 2020-12-18 RX ADMIN — IOHEXOL 30 MILLILITER(S): 300 INJECTION, SOLUTION INTRAVENOUS at 13:59

## 2020-12-18 RX ADMIN — FAMOTIDINE 40 MILLIGRAM(S): 10 INJECTION INTRAVENOUS at 17:31

## 2020-12-18 NOTE — PROGRESS NOTE ADULT - PROBLEM SELECTOR PLAN 3
-c/o epigastric pain likely musculoskeletal pain due to severe cough  -12 lead EKG wnl  -Troponin x3 negative, no other risk factors    -cont supportive care with Pepcid and Tylenol   -Cardio Dr. Hyman

## 2020-12-18 NOTE — CONSULT NOTE ADULT - SUBJECTIVE AND OBJECTIVE BOX
CHIEF COMPLAINT:Patient is a 30y old  Male who presents with a chief complaint of Covid infection.      HPI:  29 YO pt who works in a [employer], was tested positive for covid 19 on 12/10/20 is presenting with shortness of breath and blood in stool. Patient was seen in the ED on friday with same symptoms and was discharged ,He is coming back with hematemesis, diarrhea and blood in stools. patient endorses severe chest pain that is parasternal localized increases with eating. Patient also endorses vomiting whenever he tries to eat and loss of appetite. Patient reports that he was saturating around 85% at home, however, in the hospital he is saturating 95% on RA and not in Respiratory distress.   Off note patient has history of constipation and was found to have polyp few years ago,     ED Course:  1 dose of  Duoneb and Toradol  Troponin negative x 2  CXR normal, no new changes   Vital Signs Last 24 Hrs  T(C): 37.1 (17 Dec 2020 15:29), Max: 37.1 (17 Dec 2020 15:29)  T(F): 98.8 (17 Dec 2020 15:29), Max: 98.8 (17 Dec 2020 15:29)  HR: 102 (17 Dec 2020 15:29) (77 - 102)  BP: 148/79 (17 Dec 2020 15:29) (132/86 - 148/79)  RR: 17 (17 Dec 2020 15:29) (16 - 17)  SpO2: 95% (17 Dec 2020 16:50) (95% - 97%) (17 Dec 2020 17:06)      PAST MEDICAL & SURGICAL HISTORY:  Colonic polyp  Obesity          MEDICATIONS  (STANDING):  ascorbic acid 1000 milliGRAM(s) Oral daily  azithromycin  IVPB      azithromycin  IVPB 500 milliGRAM(s) IV Intermittent every 24 hours  cholecalciferol 1000 Unit(s) Oral daily  enoxaparin Injectable 40 milliGRAM(s) SubCutaneous daily  famotidine    Tablet 40 milliGRAM(s) Oral two times a day  gemfibrozil 600 milliGRAM(s) Oral two times a day  montelukast 10 milliGRAM(s) Oral every 24 hours  zinc sulfate 220 milliGRAM(s) Oral daily    MEDICATIONS  (PRN):  acetaminophen   Tablet .. 650 milliGRAM(s) Oral every 6 hours PRN Temp greater or equal to 38C (100.4F), Mild Pain (1 - 3)  ALBUTerol    90 MICROgram(s) HFA Inhaler 2 Puff(s) Inhalation every 6 hours PRN Shortness of Breath  aluminum hydroxide/magnesium hydroxide/simethicone Suspension 30 milliLiter(s) Oral every 8 hours PRN Dyspepsia  guaiFENesin   Syrup  (Sugar-Free) 100 milliGRAM(s) Oral every 6 hours PRN Cough  ondansetron Injectable 4 milliGRAM(s) IV Push every 6 hours PRN Nausea and/or Vomiting      FAMILY HISTORY:No hx of CAD      SOCIAL HISTORY:    [x ] Non-smoker    [x ] Alcohol-denies    Allergies    No Known Allergies    Intolerances    	    REVIEW OF SYSTEMS:  CONSTITUTIONAL: No fever, weight loss, or fatigue  EYES: No eye pain, visual disturbances, or discharge  ENT:  No difficulty hearing, tinnitus, vertigo; No sinus or throat pain  NECK: No pain or stiffness  RESPIRATORY: No cough, wheezing, chills or hemoptysis; No Shortness of Breath  CARDIOVASCULAR: + chest pain, +palpitations, No passing out, dizziness, or leg swelling  GASTROINTESTINAL: + abdominal or epigastric pain. + nausea, vomiting, or hematemesis; No diarrhea or constipation. + hematochezia.  GENITOURINARY: No dysuria, frequency, hematuria, or incontinence  NEUROLOGICAL: No headaches, memory loss, loss of strength, numbness, or tremors  SKIN: No itching, burning, rashes, or lesions   LYMPH Nodes: No enlarged glands  ENDOCRINE: No heat or cold intolerance; No hair loss  MUSCULOSKELETAL: No joint pain or swelling; No muscle, back, or extremity pain  PSYCHIATRIC: No depression, anxiety, mood swings, or difficulty sleeping  HEME/LYMPH: No easy bruising, or bleeding gums  ALLERGY AND IMMUNOLOGIC: No hives or eczema	      PHYSICAL EXAM:  T(C): 36.6 (12-18-20 @ 05:05), Max: 37.1 (12-17-20 @ 15:29)  HR: 67 (12-18-20 @ 05:05) (67 - 111)  BP: 124/76 (12-18-20 @ 05:05) (114/70 - 148/79)  RR: 16 (12-18-20 @ 05:05) (16 - 18)  SpO2: 97% (12-18-20 @ 05:05) (93% - 97%)        Appearance: Normal	  HEENT:   Normal oral mucosa, PERRL, EOMI	  Lymphatic: No lymphadenopathy  Cardiovascular: Normal S1 S2, No JVD, No murmurs, No edema  Respiratory: Lungs clear to auscultation	  Psychiatry: A & O x 3, Mood & affect appropriate  Gastrointestinal:  Soft, Non-tender, + BS	  Skin: No rashes, No ecchymoses, No cyanosis	  Neurologic: Non-focal  Extremities: Normal range of motion, No clubbing, cyanosis or edema  Vascular: Peripheral pulses palpable 2+ bilaterally    	    ECG:    Sinus tachycardia  Nonspecific T wave abnormality      LABS:	 	    CARDIAC MARKERS ( 17 Dec 2020 15:28 )  <0.015 ng/mL / x     / x     / x     / x      CARDIAC MARKERS ( 17 Dec 2020 14:29 )  <0.015 ng/mL / x     / x     / x     / x                                  13.2   6.21  )-----------( 274      ( 18 Dec 2020 06:05 )             39.0     12-18    140  |  106  |  12  ----------------------------<  107<H>  3.6   |  26  |  0.84    Ca    9.0      18 Dec 2020 06:05  Phos  3.1     12-18  Mg     2.1     12-18    TPro  6.9  /  Alb  3.1<L>  /  TBili  0.7  /  DBili  x   /  AST  26  /  ALT  60  /  AlkPhos  71  12-18      Lipid Profile: Cholesterol 174  LDL --  HDL 28      HgA1c: lA1C with Estimated Average Glucose Result: 6.3: Method: Immunoassay   TSH: Thyroid Stimulating Hormone, Serum: 1.64 uU/mL (12-18 @ 06:05)      PREVIOUS DIAGNOSTIC TESTING:    [ ] Echocardiogram:  [ ]  Catheterization:  [ ] Stress Test:

## 2020-12-18 NOTE — PROGRESS NOTE ADULT - PROBLEM SELECTOR PLAN 2
-reported episodes of melena at home, suspected due to NSAIDs use, reported taking Ibuprofen 600mg Q4 hrs for 1 week   -history of blood in stools years ago and was found to have a polyp  -h/h remains stable   -cont Pepcid   -f/u stool occult   -avoid NSAIDs   -GI Dr Murray

## 2020-12-18 NOTE — CONSULT NOTE ADULT - ASSESSMENT
31 YO pt who works in a [employer], was tested positive for covid 19 on 12/10/20 is presenting with shortness of breath and blood in stool. Patient was seen in the ED on friday with same symptoms and was discharged ,He is coming back with hematemesis, diarrhea and blood in stools,chest pain and shortness of breath.  1.Chest pain,SOB-CTA-R/O PE.  2.Echocardiogram as outpatient.  3.Prediabetes-diet.  4.Lipid d/o-add lopid 600mg bid.  5.Abdominal pain,vomiting blood-CT abd and pelvis.  6.COVID+-abx,zinc,singulair.  7.GI and DVT prophylaxis.

## 2020-12-18 NOTE — PROGRESS NOTE ADULT - ASSESSMENT
29 YO pt who works in a [employer], was tested positive for covid 19 on 12/10/20 is presenting with shortness of breath and blood in stool. Patient is being admitted for Covid-19 [id number] and GI bleed  30 yr old male pt who works in a [employer], was tested positive for covid 19 on 12/10/20 is presenting with shortness of breath and blood in stool. Patient is being admitted for Covid-19 infection and GI symptoms

## 2020-12-18 NOTE — PROGRESS NOTE ADULT - SUBJECTIVE AND OBJECTIVE BOX
Patient is seen and examined at the bed side, is afebrile.      REVIEW OF SYSTEMS: All other review systems are negative      ALLERGIES: No Known Allergies      PHYSICAL EXAM:    Vital Signs Last 24 Hrs  T(C): 36.6 (18 Dec 2020 14:57), Max: 37 (17 Dec 2020 18:53)  T(F): 97.9 (18 Dec 2020 14:57), Max: 98.6 (17 Dec 2020 18:53)  HR: 78 (18 Dec 2020 14:57) (67 - 96)  BP: 124/72 (18 Dec 2020 14:57) (124/69 - 133/75)  BP(mean): 82 (17 Dec 2020 20:17) (82 - 82)  RR: 15 (18 Dec 2020 14:57) (15 - 18)  SpO2: 95% (18 Dec 2020 14:57) (95% - 97%)      LABS:                        13.2   6.21  )-----------( 274      ( 18 Dec 2020 06:05 )             39.0                           14.7   7.50  )-----------( 291      ( 17 Dec 2020 14:29 )             43.9       12-18    140  |  106  |  12  ----------------------------<  107<H>  3.6   |  26  |  0.84    Ca    9.0      18 Dec 2020 06:05  Phos  3.1     12-18  Mg     2.1     12-18    TPro  6.9  /  Alb  3.1<L>  /  TBili  0.7  /  DBili  x   /  AST  26  /  ALT  60  /  AlkPhos  71  12-18 12-17    140  |  107  |  12  ----------------------------<  117<H>  3.9   |  25  |  0.90    Ca    9.5      17 Dec 2020 14:29    TPro  7.8  /  Alb  3.6  /  TBili  0.6  /  DBili  x   /  AST  32  /  ALT  66<H>  /  AlkPhos  81  12-17        MEDICATIONS  (STANDING):    ascorbic acid 1000 milliGRAM(s) Oral daily  azithromycin  IVPB      azithromycin  IVPB 500 milliGRAM(s) IV Intermittent every 24 hours  cholecalciferol 1000 Unit(s) Oral daily  enoxaparin Injectable 40 milliGRAM(s) SubCutaneous daily  famotidine    Tablet 40 milliGRAM(s) Oral two times a day  gemfibrozil 600 milliGRAM(s) Oral two times a day  montelukast 10 milliGRAM(s) Oral every 24 hours  zinc sulfate 220 milliGRAM(s) Oral daily        RADIOLOGY & ADDITIONAL TESTS:    < from: Xray Chest 1 View- PORTABLE-Urgent (12.17.20 @ 14:41) >  The lung fields and pleuralsurfaces are unremarkable.      MICROBIOLOGY DATA:      C-Reactive Protein, Serum (12.18.20 @ 09:14)   C-Reactive Protein, Serum: 0.33 mg/dL Ferritin, Serum (12.18.20 @ 09:12)   Ferritin, Serum: 783 ng/mL     D-Dimer Assay, Quantitative (12.17.20 @ 14:29)  <150 ng/mL DDU   Respiratory Viral Panel with COVID-19 by JERRY (12.17.20 @ 15:11)   Rapid RVP Result: Detected   SARS-CoV-2: Detected:                  Patient is seen and examined at the bed side, is afebrile. He remains OFF oxygen. but c/o nausea and epigastric pain. The CT scan of chest and abd/pelvis shows Nodular densities in both lungs and some are groundglass in appearance. Findings may represent pneumonia/pneumonitis. New hepatic steatosis, Splenomegaly, and Apparent segmental mural thickening of the ascending colon may be due to underdistention or nonspecific colitis.      REVIEW OF SYSTEMS: All other review systems are negative      ALLERGIES: No Known Allergies        PHYSICAL EXAM:    Vital Signs Last 24 Hrs  T(C): 36.6 (18 Dec 2020 14:57), Max: 37 (17 Dec 2020 18:53)  T(F): 97.9 (18 Dec 2020 14:57), Max: 98.6 (17 Dec 2020 18:53)  HR: 78 (18 Dec 2020 14:57) (67 - 96)  BP: 124/72 (18 Dec 2020 14:57) (124/69 - 133/75)  BP(mean): 82 (17 Dec 2020 20:17) (82 - 82)  RR: 15 (18 Dec 2020 14:57) (15 - 18)  SpO2: 95% (18 Dec 2020 14:57) (95% - 97%)      LABS:                        13.2   6.21  )-----------( 274      ( 18 Dec 2020 06:05 )             39.0                           14.7   7.50  )-----------( 291      ( 17 Dec 2020 14:29 )             43.9       12-18    140  |  106  |  12  ----------------------------<  107<H>  3.6   |  26  |  0.84    Ca    9.0      18 Dec 2020 06:05  Phos  3.1     12-18  Mg     2.1     12-18    TPro  6.9  /  Alb  3.1<L>  /  TBili  0.7  /  DBili  x   /  AST  26  /  ALT  60  /  AlkPhos  71  12-18      12-17    140  |  107  |  12  ----------------------------<  117<H>  3.9   |  25  |  0.90    Ca    9.5      17 Dec 2020 14:29    TPro  7.8  /  Alb  3.6  /  TBili  0.6  /  DBili  x   /  AST  32  /  ALT  66<H>  /  AlkPhos  81  12-17        MEDICATIONS  (STANDING):    ascorbic acid 1000 milliGRAM(s) Oral daily  azithromycin  IVPB      azithromycin  IVPB 500 milliGRAM(s) IV Intermittent every 24 hours  cholecalciferol 1000 Unit(s) Oral daily  enoxaparin Injectable 40 milliGRAM(s) SubCutaneous daily  famotidine    Tablet 40 milliGRAM(s) Oral two times a day  gemfibrozil 600 milliGRAM(s) Oral two times a day  montelukast 10 milliGRAM(s) Oral every 24 hours  zinc sulfate 220 milliGRAM(s) Oral daily        RADIOLOGY & ADDITIONAL TESTS:    12/18/20: CT Abd/pelvis and Chest w/ Oral Cont and w/ IV Cont (12.18.20 @ 19:08) Nodular densities in both lungs and some are ground glass in appearance. Findings may represent pneumonia/pneumonitis.  Clinical correlation is recommended. Follow-up chest CT may be pursued in 4-6 weeks to ensure resolution.  New hepatic steatosis.   Splenomegaly.   Apparent segmental mural thickening of the ascending colon may be due to underdistention or nonspecific colitis. Clinical correlation is recommended.      12/17/20 : Xray Chest 1 View- PORTABLE-Urgent (12.17.20 @ 14:41) The lung fields and pleural  surfaces are unremarkable.        MICROBIOLOGY DATA:    C-Reactive Protein, Serum (12.18.20 @ 09:14)   C-Reactive Protein, Serum: 0.33 mg/dL Ferritin, Serum (12.18.20 @ 09:12)   Ferritin, Serum: 783 ng/mL     D-Dimer Assay, Quantitative (12.17.20 @ 14:29)  <150 ng/mL DDU   Respiratory Viral Panel with COVID-19 by JERRY (12.17.20 @ 15:11)   Rapid RVP Result: Detected   SARS-CoV-2: Detected:

## 2020-12-18 NOTE — PROGRESS NOTE ADULT - PROBLEM SELECTOR PLAN 1
-recently diagnosed with COVID on 12/10, p/w  worsening cough, chest pain, hematemesis and hematochezia and reports of hypoxia at home   -CXR negative, not requiring supplemental oxygen, in mild symptoms category no role of steroids or Remdesivir   -cont supportive care with  Albuterol inhaler, Vitamin C , Vitamin D, Zinc and Robitussin for cough   -f/o inflammatory markers   -supplemental oxygen PRN   -maintain airborne and contact isolation   -ID Dr. Perea -recently diagnosed with COVID on 12/10, p/w  worsening cough, chest pain, hematemesis and hematochezia and reports of hypoxia at home   -CXR negative, not requiring supplemental oxygen, in mild symptoms category no role of steroids or Remdesivir   -cont supportive care with  Albuterol inhaler, Vitamin C , Vitamin D, Zinc, Pepcid and Robitussin for cough   -f/u inflammatory markers   -supplemental oxygen PRN   -maintain airborne and contact isolation   -ID Dr. Perea

## 2020-12-18 NOTE — PROGRESS NOTE ADULT - ASSESSMENT
Patient is a 30y old  Male who works in a [employer], was tested positive for covid 19 on 12/10/20 and now presenting with shortness of breath and blood in stool. Patient was seen in the ED on Friday with same symptoms and was discharged ,He is coming back with hematemesis, diarrhea and blood in stools. Patient endorses severe chest pain that is parasternal localized , increases with eating. Patient also endorses vomiting whenever he tries to eat and loss of appetite. Patient reports that he was saturating around 85% at home, however, in the hospital he is saturating 95% on RA and not in Respiratory distress.  Off note patient has history of constipation and was found to have polyp few years ago, On admission, he found to have no fever, no leukocytosis but tachycardia and positive COVID test. The CXR and Troponin is negative. The ID consult requested to assist with further management.     # COVID infection    would recommend:    1. Monitor oxygen saturation closely and start on IV dexamethasone 6 mg daily and Remdesivir if saturation fall 94 % or less  2. Continue Supportive care  3. Please Adjust VIt-D level   4. Continue Bronchodilator as needed  5. COVID precautions     d/w Nursing staff    Attending Attestation:    Spent more than 45 minutes on total encounter, more than 50 % of the visit was spent counseling and/or coordinating care by the Attending physician.   Patient is a 30y old  Male who works in a [employer], was tested positive for covid 19 on 12/10/20 and now presenting with shortness of breath and blood in stool. Patient was seen in the ED on Friday with same symptoms and was discharged ,He is coming back with hematemesis, diarrhea and blood in stools. Patient endorses severe chest pain that is parasternal localized , increases with eating. Patient also endorses vomiting whenever he tries to eat and loss of appetite. Patient reports that he was saturating around 85% at home, however, in the hospital he is saturating 95% on RA and not in Respiratory distress.  Off note patient has history of constipation and was found to have polyp few years ago, On admission, he found to have no fever, no leukocytosis but tachycardia and positive COVID test. The CXR and Troponin is negative. The ID consult requested to assist with further management.     # COVID infection- currently saturating well at Room air, No indication of Dexamethasone OR Remdesivir   # Nonspecific Colitis OR underdistention  ON CT abd/pelvis shows   Apparent segmental mural thickening of the ascending colon may be due to underdistention or nonspecific colitis.     would recommend:    1. Monitor oxygen saturation closely and start on IV dexamethasone 6 mg daily and Remdesivir if saturation fall to 93 % or less  2. Continue Supportive care, antiemetic agents   3. Please Adjust VIt-D level to 2000 IU daily  4. Continue Bronchodilator as needed  5. COVID precautions     d/w Nursing staff    Attending Attestation:    Spent more than 45 minutes on total encounter, more than 50 % of the visit was spent counseling and/or coordinating care by the Attending physician.

## 2020-12-18 NOTE — PROGRESS NOTE ADULT - SUBJECTIVE AND OBJECTIVE BOX
Patient is a 30y old  Male who presents with a chief complaint of Covid infection (17 Dec 2020 19:43)    OVERNIGHT EVENTS: continues to c/o ongoing nausea and epigastric pain     REVIEW OF SYSTEMS:  CONSTITUTIONAL: No fever, chills  ENMT:  No difficulty hearing, no change in vision  NECK: No pain or stiffness  RESPIRATORY: +ve cough, intermittent SOB  CARDIOVASCULAR: No chest pain, palpitations  GASTROINTESTINAL: +ve abdominal pain, +ve nausea and vomiting, no diarrhea  GENITOURINARY: No dysuria  NEUROLOGICAL: No HA  SKIN: No itching, burning, rashes, or lesions   MUSCULOSKELETAL: No joint pain or swelling; No muscle, back, or extremity pain  PSYCHIATRIC: No depression, anxiety      T(C): 36.6 (12-18-20 @ 05:05), Max: 37.1 (12-17-20 @ 15:29)  HR: 67 (12-18-20 @ 05:05) (67 - 111)  BP: 124/76 (12-18-20 @ 05:05) (114/70 - 148/79)  RR: 16 (12-18-20 @ 05:05) (16 - 18)  SpO2: 97% (12-18-20 @ 05:05) (93% - 97%)  Wt(kg): --Vital Signs Last 24 Hrs  T(C): 36.6 (18 Dec 2020 05:05), Max: 37.1 (17 Dec 2020 15:29)  T(F): 97.8 (18 Dec 2020 05:05), Max: 98.8 (17 Dec 2020 15:29)  HR: 67 (18 Dec 2020 05:05) (67 - 111)  BP: 124/76 (18 Dec 2020 05:05) (114/70 - 148/79)  BP(mean): 82 (17 Dec 2020 20:17) (82 - 82)  RR: 16 (18 Dec 2020 05:05) (16 - 18)  SpO2: 97% (18 Dec 2020 05:05) (93% - 97%)    MEDICATIONS  (STANDING):  ascorbic acid 1000 milliGRAM(s) Oral daily  azithromycin  IVPB      azithromycin  IVPB 500 milliGRAM(s) IV Intermittent every 24 hours  cholecalciferol 1000 Unit(s) Oral daily  enoxaparin Injectable 40 milliGRAM(s) SubCutaneous daily  famotidine    Tablet 40 milliGRAM(s) Oral two times a day  montelukast 10 milliGRAM(s) Oral every 24 hours  zinc sulfate 220 milliGRAM(s) Oral daily    MEDICATIONS  (PRN):  ALBUTerol    90 MICROgram(s) HFA Inhaler 2 Puff(s) Inhalation every 6 hours PRN Shortness of Breath  guaiFENesin   Syrup  (Sugar-Free) 100 milliGRAM(s) Oral every 6 hours PRN Cough  ondansetron Injectable 4 milliGRAM(s) IV Push every 6 hours PRN Nausea and/or Vomiting      PHYSICAL EXAM:  GENERAL: NAD  EYES: clear conjunctiva  ENMT: Moist mucous membranes  NECK: Supple, No JVD  CHEST/LUNG: Clear to auscultation bilaterally; No rales, rhonchi, wheezing, or rubs  HEART: S1, S2, Regular rate and rhythm  ABDOMEN: Soft, Nontender, Nondistended; Bowel sounds present  NEURO: Alert & Oriented X3  EXTREMITIES: No LE edema, no calf tenderness  SKIN: No rashes or lesions    Consultant(s) Notes Reviewed:  [x ] YES  [ ] NO  Care Discussed with Consultants/Other Providers [ x] YES  [ ] NO    LABS:                        13.2   6.21  )-----------( 274      ( 18 Dec 2020 06:05 )             39.0     12-18    140  |  106  |  12  ----------------------------<  107<H>  3.6   |  26  |  0.84    Ca    9.0      18 Dec 2020 06:05  Phos  3.1     12-18  Mg     2.1     12-18    TPro  6.9  /  Alb  3.1<L>  /  TBili  0.7  /  DBili  x   /  AST  26  /  ALT  60  /  AlkPhos  71  12-18    CAPILLARY BLOOD GLUCOSE  RADIOLOGY & ADDITIONAL TESTS:    < from: Xray Chest 1 View- PORTABLE-Urgent (12.17.20 @ 14:41) >    EXAM:  XR CHEST PORTABLE URGENT 1V                            PROCEDURE DATE:  12/17/2020          INTERPRETATION:  AP erect chest on December 17, 2020 at 2:39 PM. Patient has chest pain.    Heart suggest normal size.    The lung fields and pleuralsurfaces are unremarkable.    Chest is similar to December 11.    IMPRESSION: Negative chest.        < end of copied text >      Imaging Personally Reviewed:  [ ] YES  [ ] NO

## 2020-12-19 LAB
ALBUMIN SERPL ELPH-MCNC: 3.4 G/DL — LOW (ref 3.5–5)
ALP SERPL-CCNC: 79 U/L — SIGNIFICANT CHANGE UP (ref 40–120)
ALT FLD-CCNC: 75 U/L DA — HIGH (ref 10–60)
AMYLASE P1 CFR SERPL: 46 U/L — SIGNIFICANT CHANGE UP (ref 25–115)
ANION GAP SERPL CALC-SCNC: 7 MMOL/L — SIGNIFICANT CHANGE UP (ref 5–17)
APPEARANCE UR: CLEAR — SIGNIFICANT CHANGE UP
AST SERPL-CCNC: 46 U/L — HIGH (ref 10–40)
BACTERIA # UR AUTO: ABNORMAL /HPF
BASOPHILS # BLD AUTO: 0.01 K/UL — SIGNIFICANT CHANGE UP (ref 0–0.2)
BASOPHILS NFR BLD AUTO: 0.2 % — SIGNIFICANT CHANGE UP (ref 0–2)
BILIRUB SERPL-MCNC: 1 MG/DL — SIGNIFICANT CHANGE UP (ref 0.2–1.2)
BILIRUB UR-MCNC: NEGATIVE — SIGNIFICANT CHANGE UP
BUN SERPL-MCNC: 11 MG/DL — SIGNIFICANT CHANGE UP (ref 7–18)
CALCIUM SERPL-MCNC: 9.7 MG/DL — SIGNIFICANT CHANGE UP (ref 8.4–10.5)
CHLORIDE SERPL-SCNC: 107 MMOL/L — SIGNIFICANT CHANGE UP (ref 96–108)
CK SERPL-CCNC: 80 U/L — SIGNIFICANT CHANGE UP (ref 35–232)
CO2 SERPL-SCNC: 27 MMOL/L — SIGNIFICANT CHANGE UP (ref 22–31)
COD CRY URNS QL: ABNORMAL /HPF
COLOR SPEC: YELLOW — SIGNIFICANT CHANGE UP
CREAT SERPL-MCNC: 1.06 MG/DL — SIGNIFICANT CHANGE UP (ref 0.5–1.3)
CRP SERPL-MCNC: 0.25 MG/DL — SIGNIFICANT CHANGE UP (ref 0–0.4)
D DIMER BLD IA.RAPID-MCNC: <150 NG/ML DDU — SIGNIFICANT CHANGE UP
DIFF PNL FLD: NEGATIVE — SIGNIFICANT CHANGE UP
EOSINOPHIL # BLD AUTO: 0.14 K/UL — SIGNIFICANT CHANGE UP (ref 0–0.5)
EOSINOPHIL NFR BLD AUTO: 2.7 % — SIGNIFICANT CHANGE UP (ref 0–6)
EPI CELLS # UR: SIGNIFICANT CHANGE UP /HPF
FERRITIN SERPL-MCNC: 806 NG/ML — HIGH (ref 30–400)
GLUCOSE SERPL-MCNC: 113 MG/DL — HIGH (ref 70–99)
GLUCOSE UR QL: NEGATIVE — SIGNIFICANT CHANGE UP
GRAN CASTS # UR COMP ASSIST: ABNORMAL /LPF
HCT VFR BLD CALC: 43.2 % — SIGNIFICANT CHANGE UP (ref 39–50)
HGB BLD-MCNC: 14.5 G/DL — SIGNIFICANT CHANGE UP (ref 13–17)
IMM GRANULOCYTES NFR BLD AUTO: 0.6 % — SIGNIFICANT CHANGE UP (ref 0–1.5)
KETONES UR-MCNC: NEGATIVE — SIGNIFICANT CHANGE UP
LDH SERPL L TO P-CCNC: 193 U/L — SIGNIFICANT CHANGE UP (ref 120–225)
LEUKOCYTE ESTERASE UR-ACNC: NEGATIVE — SIGNIFICANT CHANGE UP
LIDOCAIN IGE QN: 230 U/L — SIGNIFICANT CHANGE UP (ref 73–393)
LYMPHOCYTES # BLD AUTO: 2.02 K/UL — SIGNIFICANT CHANGE UP (ref 1–3.3)
LYMPHOCYTES # BLD AUTO: 38.8 % — SIGNIFICANT CHANGE UP (ref 13–44)
MCHC RBC-ENTMCNC: 28.6 PG — SIGNIFICANT CHANGE UP (ref 27–34)
MCHC RBC-ENTMCNC: 33.6 GM/DL — SIGNIFICANT CHANGE UP (ref 32–36)
MCV RBC AUTO: 85.2 FL — SIGNIFICANT CHANGE UP (ref 80–100)
MONOCYTES # BLD AUTO: 0.55 K/UL — SIGNIFICANT CHANGE UP (ref 0–0.9)
MONOCYTES NFR BLD AUTO: 10.6 % — SIGNIFICANT CHANGE UP (ref 2–14)
NEUTROPHILS # BLD AUTO: 2.45 K/UL — SIGNIFICANT CHANGE UP (ref 1.8–7.4)
NEUTROPHILS NFR BLD AUTO: 47.1 % — SIGNIFICANT CHANGE UP (ref 43–77)
NITRITE UR-MCNC: NEGATIVE — SIGNIFICANT CHANGE UP
NRBC # BLD: 0 /100 WBCS — SIGNIFICANT CHANGE UP (ref 0–0)
PH UR: 6.5 — SIGNIFICANT CHANGE UP (ref 5–8)
PLATELET # BLD AUTO: 293 K/UL — SIGNIFICANT CHANGE UP (ref 150–400)
POTASSIUM SERPL-MCNC: 4.1 MMOL/L — SIGNIFICANT CHANGE UP (ref 3.5–5.3)
POTASSIUM SERPL-SCNC: 4.1 MMOL/L — SIGNIFICANT CHANGE UP (ref 3.5–5.3)
PROT SERPL-MCNC: 7.3 G/DL — SIGNIFICANT CHANGE UP (ref 6–8.3)
PROT UR-MCNC: 15
RBC # BLD: 5.07 M/UL — SIGNIFICANT CHANGE UP (ref 4.2–5.8)
RBC # FLD: 13.2 % — SIGNIFICANT CHANGE UP (ref 10.3–14.5)
RBC CASTS # UR COMP ASSIST: SIGNIFICANT CHANGE UP /HPF (ref 0–2)
SODIUM SERPL-SCNC: 141 MMOL/L — SIGNIFICANT CHANGE UP (ref 135–145)
SP GR SPEC: 1.01 — SIGNIFICANT CHANGE UP (ref 1.01–1.02)
UROBILINOGEN FLD QL: NEGATIVE — SIGNIFICANT CHANGE UP
WBC # BLD: 5.2 K/UL — SIGNIFICANT CHANGE UP (ref 3.8–10.5)
WBC # FLD AUTO: 5.2 K/UL — SIGNIFICANT CHANGE UP (ref 3.8–10.5)
WBC UR QL: SIGNIFICANT CHANGE UP /HPF (ref 0–5)

## 2020-12-19 RX ORDER — CIPROFLOXACIN LACTATE 400MG/40ML
VIAL (ML) INTRAVENOUS
Refills: 0 | Status: DISCONTINUED | OUTPATIENT
Start: 2020-12-19 | End: 2020-12-19

## 2020-12-19 RX ORDER — MORPHINE SULFATE 50 MG/1
2 CAPSULE, EXTENDED RELEASE ORAL EVERY 6 HOURS
Refills: 0 | Status: DISCONTINUED | OUTPATIENT
Start: 2020-12-19 | End: 2020-12-23

## 2020-12-19 RX ORDER — CIPROFLOXACIN LACTATE 400MG/40ML
400 VIAL (ML) INTRAVENOUS ONCE
Refills: 0 | Status: COMPLETED | OUTPATIENT
Start: 2020-12-19 | End: 2020-12-19

## 2020-12-19 RX ORDER — FAMOTIDINE 10 MG/ML
20 INJECTION INTRAVENOUS
Refills: 0 | Status: DISCONTINUED | OUTPATIENT
Start: 2020-12-19 | End: 2020-12-19

## 2020-12-19 RX ORDER — PANTOPRAZOLE SODIUM 20 MG/1
40 TABLET, DELAYED RELEASE ORAL
Refills: 0 | Status: DISCONTINUED | OUTPATIENT
Start: 2020-12-19 | End: 2020-12-26

## 2020-12-19 RX ORDER — METRONIDAZOLE 500 MG
TABLET ORAL
Refills: 0 | Status: DISCONTINUED | OUTPATIENT
Start: 2020-12-19 | End: 2020-12-26

## 2020-12-19 RX ORDER — SODIUM CHLORIDE 9 MG/ML
1000 INJECTION INTRAMUSCULAR; INTRAVENOUS; SUBCUTANEOUS
Refills: 0 | Status: COMPLETED | OUTPATIENT
Start: 2020-12-19 | End: 2020-12-19

## 2020-12-19 RX ORDER — METRONIDAZOLE 500 MG
500 TABLET ORAL ONCE
Refills: 0 | Status: COMPLETED | OUTPATIENT
Start: 2020-12-19 | End: 2020-12-19

## 2020-12-19 RX ORDER — METRONIDAZOLE 500 MG
500 TABLET ORAL EVERY 8 HOURS
Refills: 0 | Status: DISCONTINUED | OUTPATIENT
Start: 2020-12-19 | End: 2020-12-26

## 2020-12-19 RX ORDER — MORPHINE SULFATE 50 MG/1
4 CAPSULE, EXTENDED RELEASE ORAL EVERY 6 HOURS
Refills: 0 | Status: DISCONTINUED | OUTPATIENT
Start: 2020-12-19 | End: 2020-12-23

## 2020-12-19 RX ORDER — KETOROLAC TROMETHAMINE 30 MG/ML
15 SYRINGE (ML) INJECTION ONCE
Refills: 0 | Status: DISCONTINUED | OUTPATIENT
Start: 2020-12-19 | End: 2020-12-19

## 2020-12-19 RX ORDER — CEFTRIAXONE 500 MG/1
1000 INJECTION, POWDER, FOR SOLUTION INTRAMUSCULAR; INTRAVENOUS EVERY 24 HOURS
Refills: 0 | Status: COMPLETED | OUTPATIENT
Start: 2020-12-19 | End: 2020-12-25

## 2020-12-19 RX ADMIN — Medication 15 MILLIGRAM(S): at 06:27

## 2020-12-19 RX ADMIN — SODIUM CHLORIDE 50 MILLILITER(S): 9 INJECTION INTRAMUSCULAR; INTRAVENOUS; SUBCUTANEOUS at 16:29

## 2020-12-19 RX ADMIN — Medication 30 MILLIGRAM(S): at 21:09

## 2020-12-19 RX ADMIN — PANTOPRAZOLE SODIUM 40 MILLIGRAM(S): 20 TABLET, DELAYED RELEASE ORAL at 17:32

## 2020-12-19 RX ADMIN — MORPHINE SULFATE 4 MILLIGRAM(S): 50 CAPSULE, EXTENDED RELEASE ORAL at 08:31

## 2020-12-19 RX ADMIN — Medication 650 MILLIGRAM(S): at 17:20

## 2020-12-19 RX ADMIN — Medication 1000 MILLIGRAM(S): at 12:21

## 2020-12-19 RX ADMIN — ENOXAPARIN SODIUM 40 MILLIGRAM(S): 100 INJECTION SUBCUTANEOUS at 12:21

## 2020-12-19 RX ADMIN — MONTELUKAST 10 MILLIGRAM(S): 4 TABLET, CHEWABLE ORAL at 20:20

## 2020-12-19 RX ADMIN — MORPHINE SULFATE 2 MILLIGRAM(S): 50 CAPSULE, EXTENDED RELEASE ORAL at 21:09

## 2020-12-19 RX ADMIN — Medication 600 MILLIGRAM(S): at 05:07

## 2020-12-19 RX ADMIN — MORPHINE SULFATE 2 MILLIGRAM(S): 50 CAPSULE, EXTENDED RELEASE ORAL at 20:51

## 2020-12-19 RX ADMIN — Medication 15 MILLIGRAM(S): at 07:00

## 2020-12-19 RX ADMIN — FAMOTIDINE 40 MILLIGRAM(S): 10 INJECTION INTRAVENOUS at 05:07

## 2020-12-19 RX ADMIN — Medication 100 MILLIGRAM(S): at 05:06

## 2020-12-19 RX ADMIN — CEFTRIAXONE 100 MILLIGRAM(S): 500 INJECTION, POWDER, FOR SOLUTION INTRAMUSCULAR; INTRAVENOUS at 20:22

## 2020-12-19 RX ADMIN — MORPHINE SULFATE 4 MILLIGRAM(S): 50 CAPSULE, EXTENDED RELEASE ORAL at 08:50

## 2020-12-19 RX ADMIN — Medication 100 MILLIGRAM(S): at 16:41

## 2020-12-19 RX ADMIN — Medication 100 MILLIGRAM(S): at 21:08

## 2020-12-19 RX ADMIN — Medication 1000 UNIT(S): at 12:21

## 2020-12-19 RX ADMIN — Medication 200 MILLIGRAM(S): at 16:41

## 2020-12-19 RX ADMIN — ONDANSETRON 4 MILLIGRAM(S): 8 TABLET, FILM COATED ORAL at 16:20

## 2020-12-19 RX ADMIN — Medication 600 MILLIGRAM(S): at 17:32

## 2020-12-19 RX ADMIN — Medication 650 MILLIGRAM(S): at 16:29

## 2020-12-19 RX ADMIN — ZINC SULFATE TAB 220 MG (50 MG ZINC EQUIVALENT) 220 MILLIGRAM(S): 220 (50 ZN) TAB at 12:21

## 2020-12-19 RX ADMIN — Medication 100 MILLIGRAM(S): at 20:51

## 2020-12-19 NOTE — PROGRESS NOTE ADULT - PROBLEM SELECTOR PLAN 1
-recently diagnosed with COVID on 12/10, p/w  worsening cough, chest pain, hematemesis and hematochezia and reports of hypoxia at home   -CXR negative, not requiring supplemental oxygen, in mild symptoms category no role of steroids or Remdesivir   -cont supportive care with  Albuterol inhaler, Vitamin C , Vitamin D, Zinc, Pepcid and Robitussin for cough   -f/u inflammatory markers   -supplemental oxygen PRN   -maintain airborne and contact isolation   -ID Dr. Perea (consult noted and much appreciated)  -CT C/A/P noted: CTA chest; no PE; possible ascending colitis - starting on IV Rocephin / Flagly  -Cont Vit C / Zinc and Vit D

## 2020-12-19 NOTE — PROGRESS NOTE ADULT - SUBJECTIVE AND OBJECTIVE BOX
Patient is seen and examined at the bed side, is afebrile. He remains OFF oxygen. but c/o nausea and epigastric pain. The CT scan of chest and abd/pelvis shows Nodular densities in both lungs and some are groundglass in appearance. Findings may represent pneumonia/pneumonitis. New hepatic steatosis, Splenomegaly, and Apparent segmental mural thickening of the ascending colon may be due to underdistention or nonspecific colitis.      REVIEW OF SYSTEMS: All other review systems are negative      ALLERGIES: No Known Allergies        PHYSICAL EXAM:  Vital Signs Last 24 Hrs  T(C): 36.3 (19 Dec 2020 14:14), Max: 36.4 (19 Dec 2020 04:46)  T(F): 97.4 (19 Dec 2020 14:14), Max: 97.5 (19 Dec 2020 04:46)  HR: 65 (19 Dec 2020 14:14) (65 - 77)  BP: 97/57 (19 Dec 2020 14:14) (97/57 - 113/69)  BP(mean): --  RR: 17 (19 Dec 2020 14:14) (16 - 17)  SpO2: 94% (19 Dec 2020 14:14) (94% - 95%)      LABS:                        14.5   5.20  )-----------( 293      ( 19 Dec 2020 08:41 )             43.2                           13.2   6.21  )-----------( 274      ( 18 Dec 2020 06:05 )             39.0         12-19    141  |  107  |  11  ----------------------------<  113<H>  4.1   |  27  |  1.06    Ca    9.7      19 Dec 2020 08:41  Phos  3.1     12-18  Mg     2.1     12-18    TPro  7.3  /  Alb  3.4<L>  /  TBili  1.0  /  DBili  x   /  AST  46<H>  /  ALT  75<H>  /  AlkPhos  79  12-19    12-18    140  |  106  |  12  ----------------------------<  107<H>  3.6   |  26  |  0.84    Ca    9.0      18 Dec 2020 06:05  Phos  3.1     12-18  Mg     2.1     12-18    TPro  6.9  /  Alb  3.1<L>  /  TBili  0.7  /  DBili  x   /  AST  26  /  ALT  60  /  AlkPhos  71  12-18        MEDICATIONS  (STANDING):    ascorbic acid 1000 milliGRAM(s) Oral daily  cholecalciferol 1000 Unit(s) Oral daily  ciprofloxacin   IVPB      enoxaparin Injectable 40 milliGRAM(s) SubCutaneous daily  gemfibrozil 600 milliGRAM(s) Oral two times a day  metroNIDAZOLE  IVPB 500 milliGRAM(s) IV Intermittent every 8 hours  metroNIDAZOLE  IVPB      montelukast 10 milliGRAM(s) Oral every 24 hours  pantoprazole  Injectable 40 milliGRAM(s) IV Push two times a day  zinc sulfate 220 milliGRAM(s) Oral daily        RADIOLOGY & ADDITIONAL TESTS:    12/18/20: CT Abd/pelvis and Chest w/ Oral Cont and w/ IV Cont (12.18.20 @ 19:08) Nodular densities in both lungs and some are ground glass in appearance. Findings may represent pneumonia/pneumonitis.  Clinical correlation is recommended. Follow-up chest CT may be pursued in 4-6 weeks to ensure resolution.  New hepatic steatosis.   Splenomegaly.   Apparent segmental mural thickening of the ascending colon may be due to underdistention or nonspecific colitis. Clinical correlation is recommended.      12/17/20 : Xray Chest 1 View- PORTABLE-Urgent (12.17.20 @ 14:41) The lung fields and pleural  surfaces are unremarkable.        MICROBIOLOGY DATA:    C-Reactive Protein, Serum (12.18.20 @ 09:14)   C-Reactive Protein, Serum: 0.33 mg/dL Ferritin, Serum (12.18.20 @ 09:12)   Ferritin, Serum: 783 ng/mL     D-Dimer Assay, Quantitative (12.17.20 @ 14:29)  <150 ng/mL DDU   Respiratory Viral Panel with COVID-19 by JERRY (12.17.20 @ 15:11)   Rapid RVP Result: Detected   SARS-CoV-2: Detected:                        Patient is seen and examined at the bed side, is afebrile. He remains OFF oxygen. but still c/o nausea.        REVIEW OF SYSTEMS: All other review systems are negative      ALLERGIES: No Known Allergies        PHYSICAL EXAM:  Vital Signs Last 24 Hrs  T(C): 36.3 (19 Dec 2020 14:14), Max: 36.4 (19 Dec 2020 04:46)  T(F): 97.4 (19 Dec 2020 14:14), Max: 97.5 (19 Dec 2020 04:46)  HR: 65 (19 Dec 2020 14:14) (65 - 77)  BP: 97/57 (19 Dec 2020 14:14) (97/57 - 113/69)  BP(mean): --  RR: 17 (19 Dec 2020 14:14) (16 - 17)  SpO2: 94% (19 Dec 2020 14:14) (94% - 95%)      LABS:                        14.5   5.20  )-----------( 293      ( 19 Dec 2020 08:41 )             43.2                           13.2   6.21  )-----------( 274      ( 18 Dec 2020 06:05 )             39.0         12-19    141  |  107  |  11  ----------------------------<  113<H>  4.1   |  27  |  1.06    Ca    9.7      19 Dec 2020 08:41  Phos  3.1     12-18  Mg     2.1     12-18    TPro  7.3  /  Alb  3.4<L>  /  TBili  1.0  /  DBili  x   /  AST  46<H>  /  ALT  75<H>  /  AlkPhos  79  12-19    12-18    140  |  106  |  12  ----------------------------<  107<H>  3.6   |  26  |  0.84    Ca    9.0      18 Dec 2020 06:05  Phos  3.1     12-18  Mg     2.1     12-18    TPro  6.9  /  Alb  3.1<L>  /  TBili  0.7  /  DBili  x   /  AST  26  /  ALT  60  /  AlkPhos  71  12-18        MEDICATIONS  (STANDING):    ascorbic acid 1000 milliGRAM(s) Oral daily  cholecalciferol 1000 Unit(s) Oral daily  ciprofloxacin   IVPB      enoxaparin Injectable 40 milliGRAM(s) SubCutaneous daily  gemfibrozil 600 milliGRAM(s) Oral two times a day  metroNIDAZOLE  IVPB 500 milliGRAM(s) IV Intermittent every 8 hours  metroNIDAZOLE  IVPB      montelukast 10 milliGRAM(s) Oral every 24 hours  pantoprazole  Injectable 40 milliGRAM(s) IV Push two times a day  zinc sulfate 220 milliGRAM(s) Oral daily        RADIOLOGY & ADDITIONAL TESTS:    12/18/20: CT Abd/pelvis and Chest w/ Oral Cont and w/ IV Cont (12.18.20 @ 19:08) Nodular densities in both lungs and some are ground glass in appearance. Findings may represent pneumonia/pneumonitis.  Clinical correlation is recommended. Follow-up chest CT may be pursued in 4-6 weeks to ensure resolution.  New hepatic steatosis.   Splenomegaly.   Apparent segmental mural thickening of the ascending colon may be due to underdistention or nonspecific colitis. Clinical correlation is recommended.      12/17/20 : Xray Chest 1 View- PORTABLE-Urgent (12.17.20 @ 14:41) The lung fields and pleural  surfaces are unremarkable.        MICROBIOLOGY DATA:    C-Reactive Protein, Serum (12.18.20 @ 09:14)   C-Reactive Protein, Serum: 0.33 mg/dL Ferritin, Serum (12.18.20 @ 09:12)   Ferritin, Serum: 783 ng/mL     D-Dimer Assay, Quantitative (12.17.20 @ 14:29)  <150 ng/mL DDU   Respiratory Viral Panel with COVID-19 by JERRY (12.17.20 @ 15:11)   Rapid RVP Result: Detected   SARS-CoV-2: Detected:

## 2020-12-19 NOTE — CHART NOTE - NSCHARTNOTEFT_GEN_A_CORE
EVENT: patient c/o of abdominal pain.    Vital Signs Last 24 Hrs  T(C): 36.4 (19 Dec 2020 04:46), Max: 36.6 (18 Dec 2020 14:57)  T(F): 97.5 (19 Dec 2020 04:46), Max: 97.9 (18 Dec 2020 14:57)  HR: 66 (19 Dec 2020 04:46) (66 - 78)  BP: 113/64 (19 Dec 2020 04:46) (113/64 - 124/72)  BP(mean): --  RR: 17 (19 Dec 2020 04:46) (15 - 17)  SpO2: 95% (19 Dec 2020 04:46) (95% - 95%)    LABS:                        13.2   6.21  )-----------( 274      ( 18 Dec 2020 06:05 )             39.0     CARDIAC MARKERS ( 17 Dec 2020 15:28 )  <0.015 ng/mL / x     / x     / x     / x      CARDIAC MARKERS ( 17 Dec 2020 14:29 )  <0.015 ng/mL / x     / x     / x     / x        12-18    140  |  106  |  12  ----------------------------<  107<H>  3.6   |  26  |  0.84    Ca    9.0      18 Dec 2020 06:05  Phos  3.1     12-18  Mg     2.1     12-18    TPro  6.9  /  Alb  3.1<L>  /  TBili  0.7  /  DBili  x   /  AST  26  /  ALT  60  /  AlkPhos  71  12-18    < from: CT Abdomen and Pelvis w/ Oral Cont and w/ IV Cont (12.18.20 @ 19:09) >  IMPRESSION:  Nodular densities in both lungs and some are ground glass in appearance. Findings may represent pneumonia/pneumonitis.  Clinical correlation is recommended. Follow-up chest CT may be pursued in 4-6 weeks to ensure resolution.  New hepatic steatosis.  Splenomegaly.  Apparent segmental mural thickening of the ascending colon may be due to underdistention or nonspecific colitis. Clinical correlation is recommended.  < end of copied text >      HPI: 31 y/o male positive for COVID19 on 12/10/20 is presenting with shortness of breath and blood in stool. Patient was seen in the ED on Friday with same symptoms and was discharged ,He is coming back with hematemesis, diarrhea and blood in stools. patient endorses severe chest pain that is parasternal localized, increases with eating. Patient also endorses vomiting whenever he tries to eat and loss of appetite. Patient reports that he was saturating around 85% at home, however, in the hospital he is saturating 95% on RA and not in Respiratory distress. Off note patient has history of constipation and was found to have polyp few years ago.     PLAN:    1) Acute respiratory distress due to COVID19 with superimposed bacterial PNA  - Does not qualify for treatment w/ remdesivir/ Dexamethasone. 02 sats stable on RA    2) Abdominal Pain  - CT w/ segmental mural thickening of the ascending colon or nonspecific colitis.   - Toradol given overnight. AVOID NSAIDs ~ Start morphine 2mg (mod), 4mg (severe)   - Change diet to clears, ADAT  - Switch to IV Pepcid BID  - f/u amylase, lipase   - EVENT: patient c/o of abdominal pain.  Seen and examined at bedside. +flank pain, + epigastric pain. +N no vomiting.    Vital Signs Last 24 Hrs  T(C): 36.4 (19 Dec 2020 04:46), Max: 36.6 (18 Dec 2020 14:57)  T(F): 97.5 (19 Dec 2020 04:46), Max: 97.9 (18 Dec 2020 14:57)  HR: 66 (19 Dec 2020 04:46) (66 - 78)  BP: 113/64 (19 Dec 2020 04:46) (113/64 - 124/72)  BP(mean): --  RR: 17 (19 Dec 2020 04:46) (15 - 17)  SpO2: 95% (19 Dec 2020 04:46) (95% - 95%)    LABS:                        13.2   6.21  )-----------( 274      ( 18 Dec 2020 06:05 )             39.0     CARDIAC MARKERS ( 17 Dec 2020 15:28 )  <0.015 ng/mL / x     / x     / x     / x      CARDIAC MARKERS ( 17 Dec 2020 14:29 )  <0.015 ng/mL / x     / x     / x     / x        12-18    140  |  106  |  12  ----------------------------<  107<H>  3.6   |  26  |  0.84    Ca    9.0      18 Dec 2020 06:05  Phos  3.1     12-18  Mg     2.1     12-18    TPro  6.9  /  Alb  3.1<L>  /  TBili  0.7  /  DBili  x   /  AST  26  /  ALT  60  /  AlkPhos  71  12-18    < from: CT Abdomen and Pelvis w/ Oral Cont and w/ IV Cont (12.18.20 @ 19:09) >  IMPRESSION:  Nodular densities in both lungs and some are ground glass in appearance. Findings may represent pneumonia/pneumonitis.  Clinical correlation is recommended. Follow-up chest CT may be pursued in 4-6 weeks to ensure resolution.  New hepatic steatosis.  Splenomegaly.  Apparent segmental mural thickening of the ascending colon may be due to underdistention or nonspecific colitis. Clinical correlation is recommended.  < end of copied text >      HPI: 29 y/o male positive for COVID19 on 12/10/20 is presenting with shortness of breath and blood in stool. Patient was seen in the ED on Friday with same symptoms and was discharged ,He is coming back with hematemesis, diarrhea and blood in stools. patient endorses severe chest pain that is parasternal localized, increases with eating. Patient also endorses vomiting whenever he tries to eat and loss of appetite. Patient reports that he was saturating around 85% at home, however, in the hospital he is saturating 95% on RA and not in Respiratory distress. Off note patient has history of constipation and was found to have polyp few years ago.     PLAN:    1) Acute respiratory distress due to COVID19 with superimposed bacterial PNA  - Does not qualify for treatment w/ Remdesivir / Dexamethasone. 02 sats stable on RA    2) Abdominal Pain, unclear etiology  - CT w/ segmental mural thickening of the ascending colon or nonspecific colitis.   - Toradol given overnight. AVOID NSAIDs ~ Start morphine 2mg (mod), 4mg (severe)   - Change diet to clears, ADAT  - d/c Pepcid BID - switch to PPI  - f/u amylase, lipase - wnl. f/u Ck lvl  - bladder scan w/o retention. send UA    Spoke with wife at length with updates. Wife is a  at [employer]. all questions/concerns addressed. Per wife patient had EGD/colonoscopy in October with Dr. Higgins and has not received bx results yet. instructed to inquire about results. EVENT: patient c/o of abdominal pain.  Seen and examined at bedside. +flank pain, + epigastric pain. +N no vomiting.    Vital Signs Last 24 Hrs  T(C): 36.4 (19 Dec 2020 04:46), Max: 36.6 (18 Dec 2020 14:57)  T(F): 97.5 (19 Dec 2020 04:46), Max: 97.9 (18 Dec 2020 14:57)  HR: 66 (19 Dec 2020 04:46) (66 - 78)  BP: 113/64 (19 Dec 2020 04:46) (113/64 - 124/72)  BP(mean): --  RR: 17 (19 Dec 2020 04:46) (15 - 17)  SpO2: 95% (19 Dec 2020 04:46) (95% - 95%)    LABS:                        13.2   6.21  )-----------( 274      ( 18 Dec 2020 06:05 )             39.0     CARDIAC MARKERS ( 17 Dec 2020 15:28 )  <0.015 ng/mL / x     / x     / x     / x      CARDIAC MARKERS ( 17 Dec 2020 14:29 )  <0.015 ng/mL / x     / x     / x     / x        12-18    140  |  106  |  12  ----------------------------<  107<H>  3.6   |  26  |  0.84    Ca    9.0      18 Dec 2020 06:05  Phos  3.1     12-18  Mg     2.1     12-18    TPro  6.9  /  Alb  3.1<L>  /  TBili  0.7  /  DBili  x   /  AST  26  /  ALT  60  /  AlkPhos  71  12-18    < from: CT Abdomen and Pelvis w/ Oral Cont and w/ IV Cont (12.18.20 @ 19:09) >  IMPRESSION:  Nodular densities in both lungs and some are ground glass in appearance. Findings may represent pneumonia/pneumonitis.  Clinical correlation is recommended. Follow-up chest CT may be pursued in 4-6 weeks to ensure resolution.  New hepatic steatosis.  Splenomegaly.  Apparent segmental mural thickening of the ascending colon may be due to underdistention or nonspecific colitis. Clinical correlation is recommended.  < end of copied text >      HPI: 29 y/o male positive for COVID19 on 12/10/20 is presenting with shortness of breath and blood in stool. Patient was seen in the ED on Friday with same symptoms and was discharged ,He is coming back with hematemesis, diarrhea and blood in stools. patient endorses severe chest pain that is parasternal localized, increases with eating. Patient also endorses vomiting whenever he tries to eat and loss of appetite. Patient reports that he was saturating around 85% at home, however, in the hospital he is saturating 95% on RA and not in Respiratory distress. Off note patient has history of constipation and was found to have polyp few years ago.     PLAN:    1) Acute respiratory distress due to COVID19 with superimposed bacterial PNA  - Does not qualify for treatment w/ Remdesivir / Dexamethasone. 02 sats stable on RA  - on azithro  - CTA no PE.    2) Abdominal Pain, unclear etiology  - CT w/ segmental mural thickening of the ascending colon or nonspecific colitis.   - Toradol given overnight. AVOID NSAIDs ~ Start morphine 2mg (mod), 4mg (severe)   - Change diet to clears, ADAT  - d/c Pepcid BID - switch to PPI  - f/u amylase, lipase - wnl. f/u Ck lvl  - bladder scan w/o retention. send UA    Spoke with wife at length with updates. Wife is a  at [employer]. all questions/concerns addressed. Per wife patient had EGD/colonoscopy in October with Dr. Higgins and has not received bx results yet. instructed to inquire about results. EVENT: patient c/o of abdominal pain.  Seen and examined at bedside. +flank pain, + epigastric pain. +N no vomiting.    Vital Signs Last 24 Hrs  T(C): 36.4 (19 Dec 2020 04:46), Max: 36.6 (18 Dec 2020 14:57)  T(F): 97.5 (19 Dec 2020 04:46), Max: 97.9 (18 Dec 2020 14:57)  HR: 66 (19 Dec 2020 04:46) (66 - 78)  BP: 113/64 (19 Dec 2020 04:46) (113/64 - 124/72)  BP(mean): --  RR: 17 (19 Dec 2020 04:46) (15 - 17)  SpO2: 95% (19 Dec 2020 04:46) (95% - 95%)    LABS:                        13.2   6.21  )-----------( 274      ( 18 Dec 2020 06:05 )             39.0     CARDIAC MARKERS ( 17 Dec 2020 15:28 )  <0.015 ng/mL / x     / x     / x     / x      CARDIAC MARKERS ( 17 Dec 2020 14:29 )  <0.015 ng/mL / x     / x     / x     / x        12-18    140  |  106  |  12  ----------------------------<  107<H>  3.6   |  26  |  0.84    Ca    9.0      18 Dec 2020 06:05  Phos  3.1     12-18  Mg     2.1     12-18    TPro  6.9  /  Alb  3.1<L>  /  TBili  0.7  /  DBili  x   /  AST  26  /  ALT  60  /  AlkPhos  71  12-18    < from: CT Abdomen and Pelvis w/ Oral Cont and w/ IV Cont (12.18.20 @ 19:09) >  IMPRESSION:  Nodular densities in both lungs and some are ground glass in appearance. Findings may represent pneumonia/pneumonitis.  Clinical correlation is recommended. Follow-up chest CT may be pursued in 4-6 weeks to ensure resolution.  New hepatic steatosis.  Splenomegaly.  Apparent segmental mural thickening of the ascending colon may be due to underdistention or nonspecific colitis. Clinical correlation is recommended.  < end of copied text >      HPI: 29 y/o male positive for COVID19 on 12/10/20 is presenting with shortness of breath and blood in stool. Patient was seen in the ED on Friday with same symptoms and was discharged ,He is coming back with hematemesis, diarrhea and blood in stools. patient endorses severe chest pain that is parasternal localized, increases with eating. Patient also endorses vomiting whenever he tries to eat and loss of appetite. Patient reports that he was saturating around 85% at home, however, in the hospital he is saturating 95% on RA and not in Respiratory distress. Off note patient has history of constipation and was found to have polyp few years ago.     PLAN:    1) Acute respiratory distress due to COVID19 with superimposed bacterial PNA  - Does not qualify for treatment w/ Remdesivir / Dexamethasone. 02 sats stable on RA  - on azithro  - CTA no PE.    2) Abdominal Pain, unclear etiology  - CT w/ segmental mural thickening of the ascending colon or nonspecific colitis. d/w Dr. Mcgrath switch to Cipro/flagyl. d/c azithro  - Toradol given overnight. AVOID NSAIDs ~ Start morphine 2mg (mod), 4mg (severe)   - Change diet to clears, ADAT  - d/c Pepcid BID - switch to PPI  - f/u amylase, lipase - wnl. f/u Ck lvl  - bladder scan w/o retention. send UA/UCx    Spoke with wife at length with updates. Wife is a  at [employer]. all questions/concerns addressed. Per wife patient had EGD/colonoscopy in October with Dr. Higgins and has not received bx results yet. instructed to inquire about results. EVENT: patient c/o of abdominal pain.  Seen and examined at bedside. +flank pain, + epigastric pain. +N no vomiting.    Vital Signs Last 24 Hrs  T(C): 36.4 (19 Dec 2020 04:46), Max: 36.6 (18 Dec 2020 14:57)  T(F): 97.5 (19 Dec 2020 04:46), Max: 97.9 (18 Dec 2020 14:57)  HR: 66 (19 Dec 2020 04:46) (66 - 78)  BP: 113/64 (19 Dec 2020 04:46) (113/64 - 124/72)  BP(mean): --  RR: 17 (19 Dec 2020 04:46) (15 - 17)  SpO2: 95% (19 Dec 2020 04:46) (95% - 95%)    LABS:                        13.2   6.21  )-----------( 274      ( 18 Dec 2020 06:05 )             39.0     CARDIAC MARKERS ( 17 Dec 2020 15:28 )  <0.015 ng/mL / x     / x     / x     / x      CARDIAC MARKERS ( 17 Dec 2020 14:29 )  <0.015 ng/mL / x     / x     / x     / x        12-18    140  |  106  |  12  ----------------------------<  107<H>  3.6   |  26  |  0.84    Ca    9.0      18 Dec 2020 06:05  Phos  3.1     12-18  Mg     2.1     12-18    TPro  6.9  /  Alb  3.1<L>  /  TBili  0.7  /  DBili  x   /  AST  26  /  ALT  60  /  AlkPhos  71  12-18    < from: CT Abdomen and Pelvis w/ Oral Cont and w/ IV Cont (12.18.20 @ 19:09) >  IMPRESSION:  Nodular densities in both lungs and some are ground glass in appearance. Findings may represent pneumonia/pneumonitis.  Clinical correlation is recommended. Follow-up chest CT may be pursued in 4-6 weeks to ensure resolution.  New hepatic steatosis.  Splenomegaly.  Apparent segmental mural thickening of the ascending colon may be due to underdistention or nonspecific colitis. Clinical correlation is recommended.  < end of copied text >    MEDICATIONS  (STANDING):  ascorbic acid 1000 milliGRAM(s) Oral daily  cholecalciferol 1000 Unit(s) Oral daily  ciprofloxacin   IVPB      ciprofloxacin   IVPB 400 milliGRAM(s) IV Intermittent once  enoxaparin Injectable 40 milliGRAM(s) SubCutaneous daily  gemfibrozil 600 milliGRAM(s) Oral two times a day  metroNIDAZOLE  IVPB 500 milliGRAM(s) IV Intermittent once  metroNIDAZOLE  IVPB 500 milliGRAM(s) IV Intermittent every 8 hours  metroNIDAZOLE  IVPB      montelukast 10 milliGRAM(s) Oral every 24 hours  pantoprazole  Injectable 40 milliGRAM(s) IV Push two times a day  sodium chloride 0.9%. 1000 milliLiter(s) (50 mL/Hr) IV Continuous <Continuous>  zinc sulfate 220 milliGRAM(s) Oral daily    MEDICATIONS  (PRN):  acetaminophen   Tablet .. 650 milliGRAM(s) Oral every 6 hours PRN Temp greater or equal to 38C (100.4F), Mild Pain (1 - 3)  ALBUTerol    90 MICROgram(s) HFA Inhaler 2 Puff(s) Inhalation every 6 hours PRN Shortness of Breath  aluminum hydroxide/magnesium hydroxide/simethicone Suspension 30 milliLiter(s) Oral every 8 hours PRN Dyspepsia  guaiFENesin   Syrup  (Sugar-Free) 100 milliGRAM(s) Oral every 6 hours PRN Cough  morphine  - Injectable 2 milliGRAM(s) IV Push every 6 hours PRN Moderate Pain (4 - 6)  morphine  - Injectable 4 milliGRAM(s) IV Push every 6 hours PRN Severe Pain (7 - 10)  ondansetron Injectable 4 milliGRAM(s) IV Push every 6 hours PRN Nausea and/or Vomiting    HPI: 31 y/o male positive for COVID19 on 12/10/20 is presenting with shortness of breath and blood in stool. Patient was seen in the ED on Friday with same symptoms and was discharged ,He is coming back with hematemesis, diarrhea and blood in stools. patient endorses severe chest pain that is parasternal localized, increases with eating. Patient also endorses vomiting whenever he tries to eat and loss of appetite. Patient reports that he was saturating around 85% at home, however, in the hospital he is saturating 95% on RA and not in Respiratory distress. Off note patient has history of constipation and was found to have polyp few years ago.     PLAN:    1) Acute respiratory distress due to COVID19 with superimposed bacterial PNA  - Does not qualify for treatment w/ Remdesivir / Dexamethasone. 02 sats stable on RA  - on azithro  - CTA no PE.    2) Abdominal Pain linden to suspected colitis  - CT w/ segmental mural thickening of the ascending colon or nonspecific colitis. d/w Dr. Mcgrath switch to Cipro/flagyl. d/c azithro  - Toradol given overnight. AVOID NSAIDs ~ Start morphine 2mg (mod), 4mg (severe)   - Change diet to clears, ADAT. start gentle hydration x1L  - d/c Pepcid BID - switch to PPI  - f/u amylase, lipase - wnl. f/u Ck lvl  - bladder scan w/o retention. send UA/UCx    Spoke with wife at length with updates. Wife is a  at [employer]. all questions/concerns addressed. Per wife patient had EGD/colonoscopy in October with Dr. Higgins and has not received bx results yet. instructed to inquire about results.

## 2020-12-19 NOTE — PROGRESS NOTE ADULT - ASSESSMENT
31 YO pt who works in a [employer], was tested positive for covid 19 on 12/10/20 is presenting with shortness of breath and blood in stool. Patient was seen in the ED on friday with same symptoms and was discharged ,He is coming back with hematemesis, diarrhea and blood in stools,chest pain and shortness of breath,COVID+ pneumonia.  1.Echocardiogram as outpatient.  2.Prediabetes-diet.  3.Lipid d/o-add lopid 600mg bid.  4.Abdominal pain,vomiting blood-CT abd and pelvis as above.  5.COVID+-abx,zinc,singulair,ID f/u..  6.GI and DVT prophylaxis.

## 2020-12-19 NOTE — PROGRESS NOTE ADULT - SUBJECTIVE AND OBJECTIVE BOX
FRANKO GUNN  MR# [id number]  30yMale        Patient is a 30y old  Male who presents with a chief complaint of Covid infection (19 Dec 2020 17:37)      INTERVAL HPI/OVERNIGHT EVENTS:  Patient seen and examined at bedside. No notations of chest pain, palpitation, SOB, orthopnea, nausea, vomiting or abdominal pain.    ALLERGIES  No Known Allergies      MEDICATIONS  acetaminophen   Tablet .. 650 milliGRAM(s) Oral every 6 hours PRN Temp greater or equal to 38C (100.4F), Mild Pain (1 - 3)  ALBUTerol    90 MICROgram(s) HFA Inhaler 2 Puff(s) Inhalation every 6 hours PRN Shortness of Breath  aluminum hydroxide/magnesium hydroxide/simethicone Suspension 30 milliLiter(s) Oral every 8 hours PRN Dyspepsia  ascorbic acid 1000 milliGRAM(s) Oral daily  cefTRIAXone   IVPB 1000 milliGRAM(s) IV Intermittent every 24 hours  cholecalciferol 1000 Unit(s) Oral daily  enoxaparin Injectable 40 milliGRAM(s) SubCutaneous daily  gemfibrozil 600 milliGRAM(s) Oral two times a day  guaiFENesin   Syrup  (Sugar-Free) 100 milliGRAM(s) Oral every 6 hours PRN Cough  metroNIDAZOLE  IVPB 500 milliGRAM(s) IV Intermittent every 8 hours  metroNIDAZOLE  IVPB      montelukast 10 milliGRAM(s) Oral every 24 hours  morphine  - Injectable 2 milliGRAM(s) IV Push every 6 hours PRN Moderate Pain (4 - 6)  morphine  - Injectable 4 milliGRAM(s) IV Push every 6 hours PRN Severe Pain (7 - 10)  ondansetron Injectable 4 milliGRAM(s) IV Push every 6 hours PRN Nausea and/or Vomiting  pantoprazole  Injectable 40 milliGRAM(s) IV Push two times a day  zinc sulfate 220 milliGRAM(s) Oral daily              REVIEW OF SYSTEMS:  CONSTITUTIONAL: No fever, weight loss, or fatigue  EYES: No eye pain, visual disturbances, or discharge  ENT:  No difficulty hearing, tinnitus, vertigo; No sinus or throat pain  NECK: No pain or stiffness  RESPIRATORY: No cough, wheezing, chills or hemoptysis; No Shortness of Breath  CARDIOVASCULAR: No chest pain, palpitations, passing out, dizziness, or leg swelling  GASTROINTESTINAL: No abdominal or epigastric pain. No nausea, vomiting, or hematemesis; No diarrhea or constipation. No melena or hematochezia.  GENITOURINARY: No dysuria, frequency, hematuria, or incontinence  NEUROLOGICAL: No headaches, memory loss, loss of strength, numbness, or tremors  SKIN: No itching, burning, rashes, or lesions   LYMPH Nodes: No enlarged glands  ENDOCRINE: No heat or cold intolerance; No hair loss  MUSCULOSKELETAL: No joint pain or swelling; No muscle, back, or extremity pain  PSYCHIATRIC: No depression, anxiety, mood swings, or difficulty sleeping  HEME/LYMPH: No easy bruising, or bleeding gums  ALLERGY AND IMMUNOLOGIC: No hives or eczema	    [ ] All others negative	  [ ] Unable to obtain      T(C): 36.3 (20 @ 14:14), Max: 36.4 (20 @ 04:46)  T(F): 97.4 (20 @ 14:14), Max: 97.5 (20 @ 04:46)  HR: 65 (20 @ 14:14) (65 - 77)  BP: 97/57 (20 @ 14:14) (97/57 - 113/69)  RR: 17 (20 @ 14:14) (16 - 17)  SpO2: 94% (20 @ 14:14) (94% - 95%)  Wt(kg): --    I&O's Summary        PHYSICAL EXAM:  A X O x  HEAD:  Atraumatic, Normocephalic  EYES: EOMI, PERRLA, conjunctiva and sclera clear  NECK: Supple, No JVD, Normal thyroid  Resp: CTAB, No crackles, wheezing,   CVS: Regular rate and rhythm; No discernable murmurs, rubs, or gallops  ABD: Soft, Nontender, Nondistended; Bowel sounds present  EXTREMITIES:  2+ Peripheral Pulses, No edema  LYMPH: No dicernable lymphadenopathy noted  GENERAL: NAD, well-groomed, well-developed      LABS:                        14.5   5.20  )-----------( 293      ( 19 Dec 2020 08:41 )             43.2         141  |  107  |  11  ----------------------------<  113<H>  4.1   |  27  |  1.06    Ca    9.7      19 Dec 2020 08:41  Phos  3.1     -  Mg     2.1     -    TPro  7.3  /  Alb  3.4<L>  /  TBili  1.0  /  DBili  x   /  AST  46<H>  /  ALT  75<H>  /  AlkPhos  79        Urinalysis Basic - ( 19 Dec 2020 16:22 )    Color: Yellow / Appearance: Clear / S.010 / pH: x  Gluc: x / Ketone: Negative  / Bili: Negative / Urobili: Negative   Blood: x / Protein: 15 / Nitrite: Negative   Leuk Esterase: Negative / RBC: 0-2 /HPF / WBC 3-5 /HPF   Sq Epi: x / Non Sq Epi: Few /HPF / Bacteria: Moderate /HPF      CAPILLARY BLOOD GLUCOSE          Troponins:  ProBNP:  Lipid Profile:   HgA1c:  TSH:           RADIOLOGY & ADDITIONAL TESTS:    Imaging Personally Reviewed:  [x ] YES  [ ] NO    **********************************************************************************************************    EXAM:  CT ABDOMEN AND PELVIS OC IC                        &  EXAM:  CT CHEST OC IC                            PROCEDURE DATE:  2020          INTERPRETATION:  CT of the chest, abdomen, and pelvis with IV and oral contrast    INDICATION: Chest pain. Hematemesis. Hematochezia.    TECHNIQUE: Axial multidetector CT images of the chest, abdomen, and pelvis are acquired following the administration of oral contrast and IV contrast (90 cc of Omnipaque-350 administered, 10 cc discarded)    COMPARISON: Abdominal/pelvic CT dated 10/12/2011.    FINDINGS:    CHEST: No pleural or pericardial effusion. The heart is not enlarged. No evidence for aortic dissection, or aneurysm. No enlarged mediastinal, hilar, or axillary lymph node. The trachea and central bronchi are patent.    No evidence of pneumothorax. Nodular densities in both lungs and some are groundglass in appearance. Findings may represent pneumonia/pneumonitis.  Clinical correlation is recommended.    Abdomen/pelvis: New hepatic steatosis. The gallbladder, common bile duct, pancreas, adrenals and kidneys appear unremarkable. No evidence for a ureteral calculus. No hydronephrosis.    Splenomegaly measuring 16.0 cm.    The appendix appears unremarkable. No bowel obstruction. Apparent segmental mural thickening of the ascending colon may be due to underdistention or nonspecific colitis.    No evidence of free air, ascites, or enlarged lymph node.    Distended urinary bladder which is otherwise unremarkable. The prostate appears grossly unremarkable.    IMPRESSION:    Nodular densities in both lungs and some are groundglass in appearance. Findings may represent pneumonia/pneumonitis.  Clinical correlation is recommended. Follow-up chest CT may be pursued in 4-6 weeks to ensure resolution.    New hepatic steatosis.    Splenomegaly.    Apparent segmental mural thickening of the ascending colon may be due to underdistention or nonspecific colitis. Clinical correlation is recommended.    ************************************************************************************************************          Consultant(s) Notes Reviewed:  [x ] YES  [ ] NO    Care Discussed with Consultants/Other Providers [ x] YES  [ ] NO          PAST MEDICAL & SURGICAL HISTORY:  Colonic polyp    No pertinent past medical history          Infection due to severe acute respiratory syndrome coronavirus 2 (SARS-CoV-2)    Handoff    MEWS Score    Colonic polyp    No pertinent past medical history    No pertinent past medical history    COVID-19    Prophylactic measure    Chest pain    Blood in stool    COVID-19    W/CHEST PAIN    5    Chest pain    Blood in stool    Hemoptysis    SOB (shortness of breath) on exertion    [email or web address]

## 2020-12-19 NOTE — PROGRESS NOTE ADULT - ASSESSMENT
30 yr old male pt who works in a [employer], was tested positive for covid 19 on 12/10/20 is presenting with shortness of breath and blood in stool. Patient is being admitted for Covid-19 infection and GI symptoms

## 2020-12-19 NOTE — PROGRESS NOTE ADULT - ASSESSMENT
Patient is a 30y old  Male who works in a [employer], was tested positive for covid 19 on 12/10/20 and now presenting with shortness of breath and blood in stool. Patient was seen in the ED on Friday with same symptoms and was discharged ,He is coming back with hematemesis, diarrhea and blood in stools. Patient endorses severe chest pain that is parasternal localized , increases with eating. Patient also endorses vomiting whenever he tries to eat and loss of appetite. Patient reports that he was saturating around 85% at home, however, in the hospital he is saturating 95% on RA and not in Respiratory distress.  Off note patient has history of constipation and was found to have polyp few years ago, On admission, he found to have no fever, no leukocytosis but tachycardia and positive COVID test. The CXR and Troponin is negative. The ID consult requested to assist with further management.     # COVID infection- currently saturating well at Room air, No indication of Dexamethasone OR Remdesivir   # Nonspecific Colitis OR underdistention  ON CT abd/pelvis shows   Apparent segmental mural thickening of the ascending colon may be due to underdistention or nonspecific colitis.     would recommend:    1. Monitor oxygen saturation closely and start on IV dexamethasone 6 mg daily and Remdesivir if saturation fall to 93 % or less  2. Continue Supportive care, antiemetic agents   3. Please Adjust VIt-D level to 2000 IU daily  4. Continue Bronchodilator as needed  5. COVID precautions     d/w Nursing staff    Attending Attestation:    Spent more than 45 minutes on total encounter, more than 50 % of the visit was spent counseling and/or coordinating care by the Attending physician. Patient is a 30y old  Male who works in a [employer], was tested positive for covid 19 on 12/10/20 and now presenting with shortness of breath and blood in stool. Patient was seen in the ED on Friday with same symptoms and was discharged ,He is coming back with hematemesis, diarrhea and blood in stools. Patient endorses severe chest pain that is parasternal localized , increases with eating. Patient also endorses vomiting whenever he tries to eat and loss of appetite. Patient reports that he was saturating around 85% at home, however, in the hospital he is saturating 95% on RA and not in Respiratory distress.  Off note patient has history of constipation and was found to have polyp few years ago, On admission, he found to have no fever, no leukocytosis but tachycardia and positive COVID test. The CXR and Troponin is negative. The ID consult requested to assist with further management.     # COVID infection- currently saturating well at Room air, No indication of Dexamethasone OR Remdesivir   # Nonspecific Colitis OR underdistention  ON CT abd/pelvis shows   Apparent segmental mural thickening of the ascending colon may be due to underdistention or nonspecific colitis.     would recommend:    1. Please change cipro to Ceftriaxone and c/w Flagyl   2. Monitor oxygen saturation closely and start on IV dexamethasone 6 mg daily and Remdesivir if saturation fall to 93 % or less  3. Continue Supportive care, antiemetic agents   4. Continue Bronchodilator as needed  5. COVID precautions     d/w DR. Mclean and Nursing staff     Attending Attestation:    Spent more than 45 minutes on total encounter, more than 50 % of the visit was spent counseling and/or coordinating care by the Attending physician.

## 2020-12-19 NOTE — CHART NOTE - NSCHARTNOTEFT_GEN_A_CORE
Patient is a 30y old  Male who presents with a chief complaint of Covid infection. Called by nurse for patient complaint of abdominal pain. patient status post CT abdomen and pelvis showing colitis. patient given toradol for abd pain and successfully controlled patient pain.      Vital Signs Last 24 Hrs  T(C): 36.4 (19 Dec 2020 04:46), Max: 36.6 (18 Dec 2020 14:57)  T(F): 97.5 (19 Dec 2020 04:46), Max: 97.9 (18 Dec 2020 14:57)  HR: 66 (19 Dec 2020 04:46) (66 - 78)  BP: 113/64 (19 Dec 2020 04:46) (113/64 - 124/72)  BP(mean): --  RR: 17 (19 Dec 2020 04:46) (15 - 17)  SpO2: 95% (19 Dec 2020 04:46) (95% - 95%)      Labs:                          13.2   6.21  )-----------( 274      ( 18 Dec 2020 06:05 )             39.0     12-18    140  |  106  |  12  ----------------------------<  107<H>  3.6   |  26  |  0.84    Ca    9.0      18 Dec 2020 06:05  Phos  3.1     12-18  Mg     2.1     12-18    TPro  6.9  /  Alb  3.1<L>  /  TBili  0.7  /  DBili  x   /  AST  26  /  ALT  60  /  AlkPhos  71  12-18        Radiology:    Physical Exam:  General: WN/WD NAD  Neurology: A&Ox3, nonfocal, MAN x 4  Head:  Normocephalic, atraumatic  Respiratory: CTA B/L  CV: RRR, S1S2, no murmur  Abdominal: Soft, NT, ND no palpable mass  MSK: No edema, + peripheral pulses, FROM all 4 extremity    Assessment & Plan:  HPI:  31 YO pt who works in a [employer], was tested positive for covid 19 on 12/10/20 is presenting with shortness of breath and blood in stool. Patient was seen in the ED on friday with same symptoms and was discharged ,He is coming back with hematemesis, diarrhea and blood in stools. patient endorses severe chest pain that is parasternal localzid, increases with eating. Patient also endorses vomiting whenever he tries to eat and loss of appetite. Patient reports that he was saturating around 85% at home, however, in the hospital he is saturating 95% on RA and not in Respiratory distress.   Off note patient has history of constipation and was found to have polyp few years ago, Now with ABD pain.            Follow up with Attending in AM.

## 2020-12-19 NOTE — PROGRESS NOTE ADULT - SUBJECTIVE AND OBJECTIVE BOX
CHIEF COMPLAINT:Patient is a 30y old  Male who presents with a chief complaint of Covid infection .Pt still having abdominal pain and vomiting.    	  REVIEW OF SYSTEMS:  CONSTITUTIONAL: No fever, weight loss, or fatigue  EYES: No eye pain, visual disturbances, or discharge  ENT:  No difficulty hearing, tinnitus, vertigo; No sinus or throat pain  NECK: No pain or stiffness  RESPIRATORY: No cough, wheezing, chills or hemoptysis; No Shortness of Breath  CARDIOVASCULAR: No chest pain, palpitations, passing out, dizziness, or leg swelling  GASTROINTESTINAL: +abdominal or epigastric pain. +nausea,+vomiting; No diarrhea or constipation. No melena or hematochezia.  GENITOURINARY: No dysuria, frequency, hematuria, or incontinence  NEUROLOGICAL: No headaches, memory loss, loss of strength, numbness, or tremors  SKIN: No itching, burning, rashes, or lesions   LYMPH Nodes: No enlarged glands  ENDOCRINE: No heat or cold intolerance; No hair loss  MUSCULOSKELETAL: No joint pain or swelling; No muscle, back, or extremity pain  PSYCHIATRIC: No depression, anxiety, mood swings, or difficulty sleeping  HEME/LYMPH: No easy bruising, or bleeding gums  ALLERGY AND IMMUNOLOGIC: No hives or eczema	      PHYSICAL EXAM:  T(C): 36.4 (12-19-20 @ 04:46), Max: 36.6 (12-18-20 @ 14:57)  HR: 66 (12-19-20 @ 04:46) (66 - 78)  BP: 113/64 (12-19-20 @ 04:46) (113/64 - 124/72)  RR: 17 (12-19-20 @ 04:46) (15 - 17)  SpO2: 95% (12-19-20 @ 04:46) (95% - 95%)      Appearance: Normal	  HEENT:   Normal oral mucosa, PERRL, EOMI	  Lymphatic: No lymphadenopathy  Cardiovascular: Normal S1 S2, No JVD, No murmurs, No edema  Respiratory: Lungs clear to auscultation	  Psychiatry: A & O x 3, Mood & affect appropriate  Gastrointestinal:  Soft, Non-tender, + BS	  Skin: No rashes, No ecchymoses, No cyanosis	  Neurologic: Non-focal  Extremities: Normal range of motion, No clubbing, cyanosis or edema  Vascular: Peripheral pulses palpable 2+ bilaterally    MEDICATIONS  (STANDING):  ascorbic acid 1000 milliGRAM(s) Oral daily  azithromycin  IVPB      azithromycin  IVPB 500 milliGRAM(s) IV Intermittent every 24 hours  cholecalciferol 1000 Unit(s) Oral daily  enoxaparin Injectable 40 milliGRAM(s) SubCutaneous daily  famotidine Injectable 20 milliGRAM(s) IV Push two times a day  gemfibrozil 600 milliGRAM(s) Oral two times a day  montelukast 10 milliGRAM(s) Oral every 24 hours  zinc sulfate 220 milliGRAM(s) Oral daily    	  	  LABS:	 	      CARDIAC MARKERS ( 17 Dec 2020 15:28 )  <0.015 ng/mL / x     / x     / x     / x      CARDIAC MARKERS ( 17 Dec 2020 14:29 )  <0.015 ng/mL / x     / x     / x     / x                                    14.5   5.20  )-----------( 293      ( 19 Dec 2020 08:41 )             43.2     12-19    141  |  107  |  11  ----------------------------<  113<H>  4.1   |  27  |  1.06    Ca    9.7      19 Dec 2020 08:41  Phos  3.1     12-18  Mg     2.1     12-18    TPro  7.3  /  Alb  3.4<L>  /  TBili  1.0  /  DBili  x   /  AST  46<H>  /  ALT  75<H>  /  AlkPhos  79  12-19      Lipid Profile: Cholesterol 174  LDL --  HDL 28        TSH: Thyroid Stimulating Hormone, Serum: 1.64 uU/mL (12-18 @ 06:05)      	    c< from: CT Chest w/ Oral Cont and w/ IV Cont (12.18.20 @ 19:08) >  EXAM:  CT ABDOMEN AND PELVIS OC IC                          EXAM:  CT CHEST OC IC                            PROCEDURE DATE:  12/18/2020          INTERPRETATION:  CT of the chest, abdomen, and pelvis with IV and oral contrast    INDICATION: Chest pain. Hematemesis. Hematochezia.    TECHNIQUE: Axial multidetector CT images of the chest, abdomen, and pelvis are acquired following the administration of oral contrast and IV contrast (90 cc of Omnipaque-350 administered, 10 cc discarded)    COMPARISON: Abdominal/pelvic CT dated 10/12/2011.    FINDINGS:    CHEST: No pleural or pericardial effusion. The heart is not enlarged. No evidence for aortic dissection, or aneurysm. No enlarged mediastinal, hilar, or axillary lymph node. The trachea and central bronchi are patent.    No evidence of pneumothorax. Nodular densities in both lungs and some are groundglass in appearance. Findings may represent pneumonia/pneumonitis.  Clinical correlation is recommended.    Abdomen/pelvis: New hepatic steatosis. The gallbladder, common bile duct, pancreas, adrenals and kidneys appear unremarkable. No evidence for a ureteral calculus. No hydronephrosis.    Splenomegaly measuring 16.0 cm.    The appendix appears unremarkable. No bowel obstruction. Apparent segmental mural thickening of the ascending colon may be due to underdistention or nonspecific colitis.    No evidence of free air, ascites, or enlarged lymph node.    Distended urinary bladder which is otherwise unremarkable. The prostate appears grossly unremarkable.    IMPRESSION:    Nodular densities in both lungs and some are groundglass in appearance. Findings may represent pneumonia/pneumonitis.  Clinical correlation is recommended. Follow-up chest CT may be pursued in 4-6 weeks to ensure resolution.    New hepatic steatosis.    Splenomegaly.    Apparent segmental mural thickening of the ascending colon may be due to underdistention or nonspecific colitis. Clinical correlation is recommended.              TRISH ALVARES MD; Attending Radiologist  This document has been electronically signed. Dec 18 2020  7:40PM

## 2020-12-20 LAB
ALBUMIN SERPL ELPH-MCNC: 3.6 G/DL — SIGNIFICANT CHANGE UP (ref 3.5–5)
ALP SERPL-CCNC: 84 U/L — SIGNIFICANT CHANGE UP (ref 40–120)
ALT FLD-CCNC: 103 U/L DA — HIGH (ref 10–60)
ANION GAP SERPL CALC-SCNC: 6 MMOL/L — SIGNIFICANT CHANGE UP (ref 5–17)
AST SERPL-CCNC: 49 U/L — HIGH (ref 10–40)
BASOPHILS # BLD AUTO: 0.02 K/UL — SIGNIFICANT CHANGE UP (ref 0–0.2)
BASOPHILS NFR BLD AUTO: 0.4 % — SIGNIFICANT CHANGE UP (ref 0–2)
BILIRUB SERPL-MCNC: 0.9 MG/DL — SIGNIFICANT CHANGE UP (ref 0.2–1.2)
BUN SERPL-MCNC: 10 MG/DL — SIGNIFICANT CHANGE UP (ref 7–18)
CALCIUM SERPL-MCNC: 10 MG/DL — SIGNIFICANT CHANGE UP (ref 8.4–10.5)
CHLORIDE SERPL-SCNC: 106 MMOL/L — SIGNIFICANT CHANGE UP (ref 96–108)
CO2 SERPL-SCNC: 27 MMOL/L — SIGNIFICANT CHANGE UP (ref 22–31)
CREAT SERPL-MCNC: 1.15 MG/DL — SIGNIFICANT CHANGE UP (ref 0.5–1.3)
CRP SERPL-MCNC: 0.21 MG/DL — SIGNIFICANT CHANGE UP (ref 0–0.4)
EOSINOPHIL # BLD AUTO: 0.1 K/UL — SIGNIFICANT CHANGE UP (ref 0–0.5)
EOSINOPHIL NFR BLD AUTO: 2 % — SIGNIFICANT CHANGE UP (ref 0–6)
GLUCOSE SERPL-MCNC: 110 MG/DL — HIGH (ref 70–99)
HCT VFR BLD CALC: 45.3 % — SIGNIFICANT CHANGE UP (ref 39–50)
HGB BLD-MCNC: 15.1 G/DL — SIGNIFICANT CHANGE UP (ref 13–17)
IMM GRANULOCYTES NFR BLD AUTO: 0.4 % — SIGNIFICANT CHANGE UP (ref 0–1.5)
LYMPHOCYTES # BLD AUTO: 1.93 K/UL — SIGNIFICANT CHANGE UP (ref 1–3.3)
LYMPHOCYTES # BLD AUTO: 37.8 % — SIGNIFICANT CHANGE UP (ref 13–44)
MCHC RBC-ENTMCNC: 28.2 PG — SIGNIFICANT CHANGE UP (ref 27–34)
MCHC RBC-ENTMCNC: 33.3 GM/DL — SIGNIFICANT CHANGE UP (ref 32–36)
MCV RBC AUTO: 84.7 FL — SIGNIFICANT CHANGE UP (ref 80–100)
MONOCYTES # BLD AUTO: 0.52 K/UL — SIGNIFICANT CHANGE UP (ref 0–0.9)
MONOCYTES NFR BLD AUTO: 10.2 % — SIGNIFICANT CHANGE UP (ref 2–14)
MRSA PCR RESULT.: SIGNIFICANT CHANGE UP
NEUTROPHILS # BLD AUTO: 2.51 K/UL — SIGNIFICANT CHANGE UP (ref 1.8–7.4)
NEUTROPHILS NFR BLD AUTO: 49.2 % — SIGNIFICANT CHANGE UP (ref 43–77)
NRBC # BLD: 0 /100 WBCS — SIGNIFICANT CHANGE UP (ref 0–0)
PLATELET # BLD AUTO: 313 K/UL — SIGNIFICANT CHANGE UP (ref 150–400)
POTASSIUM SERPL-MCNC: 4 MMOL/L — SIGNIFICANT CHANGE UP (ref 3.5–5.3)
POTASSIUM SERPL-SCNC: 4 MMOL/L — SIGNIFICANT CHANGE UP (ref 3.5–5.3)
PROT SERPL-MCNC: 7.9 G/DL — SIGNIFICANT CHANGE UP (ref 6–8.3)
RBC # BLD: 5.35 M/UL — SIGNIFICANT CHANGE UP (ref 4.2–5.8)
RBC # FLD: 12.9 % — SIGNIFICANT CHANGE UP (ref 10.3–14.5)
S AUREUS DNA NOSE QL NAA+PROBE: SIGNIFICANT CHANGE UP
SODIUM SERPL-SCNC: 139 MMOL/L — SIGNIFICANT CHANGE UP (ref 135–145)
WBC # BLD: 5.1 K/UL — SIGNIFICANT CHANGE UP (ref 3.8–10.5)
WBC # FLD AUTO: 5.1 K/UL — SIGNIFICANT CHANGE UP (ref 3.8–10.5)

## 2020-12-20 RX ORDER — ONDANSETRON 8 MG/1
4 TABLET, FILM COATED ORAL EVERY 8 HOURS
Refills: 0 | Status: COMPLETED | OUTPATIENT
Start: 2020-12-20 | End: 2020-12-22

## 2020-12-20 RX ADMIN — ENOXAPARIN SODIUM 40 MILLIGRAM(S): 100 INJECTION SUBCUTANEOUS at 11:35

## 2020-12-20 RX ADMIN — Medication 100 MILLIGRAM(S): at 11:35

## 2020-12-20 RX ADMIN — ONDANSETRON 4 MILLIGRAM(S): 8 TABLET, FILM COATED ORAL at 06:42

## 2020-12-20 RX ADMIN — MONTELUKAST 10 MILLIGRAM(S): 4 TABLET, CHEWABLE ORAL at 21:23

## 2020-12-20 RX ADMIN — Medication 100 MILLIGRAM(S): at 05:28

## 2020-12-20 RX ADMIN — Medication 100 MILLIGRAM(S): at 17:37

## 2020-12-20 RX ADMIN — CEFTRIAXONE 100 MILLIGRAM(S): 500 INJECTION, POWDER, FOR SOLUTION INTRAMUSCULAR; INTRAVENOUS at 17:37

## 2020-12-20 RX ADMIN — Medication 100 MILLIGRAM(S): at 21:21

## 2020-12-20 RX ADMIN — Medication 1000 MILLIGRAM(S): at 11:34

## 2020-12-20 RX ADMIN — Medication 650 MILLIGRAM(S): at 18:14

## 2020-12-20 RX ADMIN — ONDANSETRON 4 MILLIGRAM(S): 8 TABLET, FILM COATED ORAL at 21:47

## 2020-12-20 RX ADMIN — ALBUTEROL 2 PUFF(S): 90 AEROSOL, METERED ORAL at 11:35

## 2020-12-20 RX ADMIN — Medication 600 MILLIGRAM(S): at 17:37

## 2020-12-20 RX ADMIN — MORPHINE SULFATE 4 MILLIGRAM(S): 50 CAPSULE, EXTENDED RELEASE ORAL at 14:12

## 2020-12-20 RX ADMIN — Medication 600 MILLIGRAM(S): at 05:28

## 2020-12-20 RX ADMIN — Medication 1000 UNIT(S): at 11:34

## 2020-12-20 RX ADMIN — PANTOPRAZOLE SODIUM 40 MILLIGRAM(S): 20 TABLET, DELAYED RELEASE ORAL at 17:37

## 2020-12-20 RX ADMIN — MORPHINE SULFATE 4 MILLIGRAM(S): 50 CAPSULE, EXTENDED RELEASE ORAL at 11:35

## 2020-12-20 RX ADMIN — ZINC SULFATE TAB 220 MG (50 MG ZINC EQUIVALENT) 220 MILLIGRAM(S): 220 (50 ZN) TAB at 11:35

## 2020-12-20 RX ADMIN — Medication 650 MILLIGRAM(S): at 19:10

## 2020-12-20 RX ADMIN — MORPHINE SULFATE 2 MILLIGRAM(S): 50 CAPSULE, EXTENDED RELEASE ORAL at 06:34

## 2020-12-20 RX ADMIN — ONDANSETRON 4 MILLIGRAM(S): 8 TABLET, FILM COATED ORAL at 14:46

## 2020-12-20 RX ADMIN — PANTOPRAZOLE SODIUM 40 MILLIGRAM(S): 20 TABLET, DELAYED RELEASE ORAL at 05:28

## 2020-12-20 RX ADMIN — Medication 100 MILLIGRAM(S): at 12:31

## 2020-12-20 RX ADMIN — MORPHINE SULFATE 2 MILLIGRAM(S): 50 CAPSULE, EXTENDED RELEASE ORAL at 07:13

## 2020-12-20 NOTE — PROGRESS NOTE ADULT - PROBLEM SELECTOR PLAN 2
-reported episodes of melena at home, suspected due to NSAIDs use, reported taking Ibuprofen 600mg Q4 hrs for 1 week   -history of blood in stools years ago and was found to have a polyp  -h/h remains stable   -cont Pepcid   -f/u stool occult   -avoid NSAIDs   -GI Dr Murray (noted and appreciated); f/u stool cx w/ Ova/Parasite eval.

## 2020-12-20 NOTE — CONSULT NOTE ADULT - SUBJECTIVE AND OBJECTIVE BOX
[  ] STAT REQUEST              [ X ] ROUTINE REQUEST    Patient is a 30 year old male with rectal bleeding. GI consulted to evaluate.          HPI:  30 year old male who works in a [employer], was tested positive for COVID-19 on 12/10/20 is presenting with shortness of breath and blood in stool. Patient was seen in the emergency room on Friday with same symptoms and was discharged ,He is coming back with hematemesis, diarrhea and blood in stools. patient endorses severe chest pain that is parasternal localized increases with eating. Patient also endorses vomiting whenever he tries to eat and loss of appetite. Patient reports that he was saturating around 85% at home, however, in the hospital he is saturating 95% on RA and not in Respiratory distress.    Patient c/o constipation in general but denies abdominal pain, melena, chills, cgest pain, palpitation, hematuria, epistaxis, hemoptysis or dysuria.         PAIN MANAGEMENT:  Pain Scale:                0 /10  Pain Location:      Prior Colonoscopy:  Unknown    PAST MEDICAL HISTORY  Colonic polyp       PAST SURGICAL HISTORY  No significant surgical history      Allergies    No Known Allergies    Intolerances  None      MEDICATIONS  (STANDING):  ascorbic acid 1000 milliGRAM(s) Oral daily  cefTRIAXone   IVPB 1000 milliGRAM(s) IV Intermittent every 24 hours  cholecalciferol 1000 Unit(s) Oral daily  enoxaparin Injectable 40 milliGRAM(s) SubCutaneous daily  gemfibrozil 600 milliGRAM(s) Oral two times a day  metroNIDAZOLE  IVPB 500 milliGRAM(s) IV Intermittent every 8 hours  metroNIDAZOLE  IVPB      montelukast 10 milliGRAM(s) Oral every 24 hours  pantoprazole  Injectable 40 milliGRAM(s) IV Push two times a day  zinc sulfate 220 milliGRAM(s) Oral daily    MEDICATIONS  (PRN):  acetaminophen   Tablet .. 650 milliGRAM(s) Oral every 6 hours PRN Temp greater or equal to 38C (100.4F), Mild Pain (1 - 3)  ALBUTerol    90 MICROgram(s) HFA Inhaler 2 Puff(s) Inhalation every 6 hours PRN Shortness of Breath  aluminum hydroxide/magnesium hydroxide/simethicone Suspension 30 milliLiter(s) Oral every 8 hours PRN Dyspepsia  guaiFENesin   Syrup  (Sugar-Free) 100 milliGRAM(s) Oral every 6 hours PRN Cough  morphine  - Injectable 2 milliGRAM(s) IV Push every 6 hours PRN Moderate Pain (4 - 6)  morphine  - Injectable 4 milliGRAM(s) IV Push every 6 hours PRN Severe Pain (7 - 10)      SOCIAL HISTORY  Advanced Directives:       [ X ] Full Code       [  ] DNR  Marital Status:         [  ] M      [ X ] S      [  ] D       [  ] W  Children:       [ X ] Yes      [  ] No  Occupation:        [  ] Employed       [ X ] Unemployed       [  ] Retired  Diet:       [ X ] Regular       [  ] PEG feeding          [  ] NG tube feeding  Drug Use:           [ X ] Patient denied          [  ] Yes  Alcohol:           [X  ] No             [  ] Yes (socially)         [  ] Yes (chronic)  Tobacco:           [  ] Yes           [ X ] No    FAMILY HISTORY  [X  ] Heart Disease            [X  ] Diabetes             [ X ] HTN             [  ] Colon Cancer             [  ] Stomach Cancer              [  ] Pancreatic Cancer      VITAL SIGNS   Vital Signs Last 24 Hrs  T(C): 36.3 (20 Dec 2020 05:43), Max: 36.3 (19 Dec 2020 14:14)  T(F): 97.4 (20 Dec 2020 05:43), Max: 97.4 (19 Dec 2020 14:14)  HR: 58 (20 Dec 2020 05:43) (58 - 65)  BP: 105/55 (20 Dec 2020 05:43) (97/57 - 114/68)   RR: 17 (20 Dec 2020 05:43) (16 - 17)  SpO2: 96% (20 Dec 2020 05:43) (94% - 99%)           CBC Full  -  ( 20 Dec 2020 07:10 )  WBC Count : 5.10 K/uL  RBC Count : 5.35 M/uL  Hemoglobin : 15.1 g/dL  Hematocrit : 45.3 %  Platelet Count - Automated : 313 K/uL  Mean Cell Volume : 84.7 fl  Mean Cell Hemoglobin : 28.2 pg  Mean Cell Hemoglobin Concentration : 33.3 gm/dL  Auto Neutrophil # : 2.51 K/uL  Auto Lymphocyte # : 1.93 K/uL  Auto Monocyte # : 0.52 K/uL  Auto Eosinophil # : 0.10 K/uL  Auto Basophil # : 0.02 K/uL  Auto Neutrophil % : 49.2 %  Auto Lymphocyte % : 37.8 %  Auto Monocyte % : 10.2 %  Auto Eosinophil % : 2.0 %  Auto Basophil % : 0.4 %      12-20    139  |  106  |  10  ----------------------------<  110<H>  4.0   |  27  |  1.15    Ca    10.0      20 Dec 2020 07:10    TPro  7.9  /  Alb  3.6  /  TBili  0.9  /  DBili  x   /  AST  49<H>  /  ALT  103<H>  /  AlkPhos  84  12-20    Urinalysis    pH Urine: 6.5   Glucose Qualitative, Urine: Negative   Blood, Urine: Negative   Color: Yellow   Urine Appearance: Clear   Bilirubin: Negative   Ketone - Urine: Negative   Specific Gravity: 1.010   Protein, Urine: 15   Urobilinogen: Negative   Nitrite: Negative   Leukocyte Esterase Concentration: Negative         ECG    Ventricular Rate 86 BPM    Atrial Rate 86 BPM    P-R Interval 156 ms    QRS Duration 94 ms    Q-T Interval 342 ms    QTC Calculation(Bazett) 409 ms    P Axis 70 degrees    R Axis 81 degrees    T Axis 27 degrees    Diagnosis Line *** Poor data quality, interpretation may be adversely affected  Normal sinus rhythm  Normal ECG       RADIOLOGY/IMAGING                EXAM:  CT ABDOMEN AND PELVIS OC IC                          EXAM:  CT CHEST OC IC                            PROCEDURE DATE:  12/18/2020          INTERPRETATION:  CT of the chest, abdomen, and pelvis with IV and oral contrast    INDICATION: Chest pain. Hematemesis. Hematochezia.    TECHNIQUE: Axial multidetector CT images of the chest, abdomen, and pelvis are acquired following the administration of oral contrast and IV contrast (90 cc of Omnipaque-350 administered, 10 cc discarded)    COMPARISON: Abdominal/pelvic CT dated 10/12/2011.    FINDINGS:    CHEST: No pleural or pericardial effusion. The heart is not enlarged. No evidence for aortic dissection, or aneurysm. No enlarged mediastinal, hilar, or axillary lymph node. The trachea and central bronchi are patent.    No evidence of pneumothorax. Nodular densities in both lungs and some are groundglass in appearance. Findings may represent pneumonia/pneumonitis.  Clinical correlation is recommended.    Abdomen/pelvis: New hepatic steatosis. The gallbladder, common bile duct, pancreas, adrenals and kidneys appear unremarkable. No evidence for a ureteral calculus. No hydronephrosis.    Splenomegaly measuring 16.0 cm.    The appendix appears unremarkable. No bowel obstruction. Apparent segmental mural thickening of the ascending colon may be due to underdistention or nonspecific colitis.    No evidence of free air, ascites, or enlarged lymph node.    Distended urinary bladder which is otherwise unremarkable. The prostate appears grossly unremarkable.    IMPRESSION:    Nodular densities in both lungs and some are groundglass in appearance. Findings may represent pneumonia/pneumonitis.  Clinical correlation is recommended. Follow-up chest CT may be pursued in 4-6 weeks to ensure resolution.    New hepatic steatosis.    Splenomegaly.    Apparent segmental mural thickening of the ascending colon may be due to underdistention or nonspecific colitis. Clinical correlation is recommended.

## 2020-12-20 NOTE — CONSULT NOTE ADULT - NEGATIVE OPHTHALMOLOGIC SYMPTOMS
no diplopia/no photophobia/no lacrimation L/no lacrimation R/no blurred vision L/no blurred vision R/no discharge L/no discharge R/no pain L/no pain R/no irritation L/no irritation R/no loss of vision L/no loss of vision R/no scleral injection L/no scleral injection R

## 2020-12-20 NOTE — PROGRESS NOTE ADULT - SUBJECTIVE AND OBJECTIVE BOX
CHIEF COMPLAINT:Patient is a 30y old  Male who presents with a chief complaint of Covid infection.Pt still reporting abdominal pain.    	  REVIEW OF SYSTEMS:  CONSTITUTIONAL: No fever, weight loss, or fatigue  EYES: No eye pain, visual disturbances, or discharge  ENT:  No difficulty hearing, tinnitus, vertigo; No sinus or throat pain  NECK: No pain or stiffness  RESPIRATORY: No cough, wheezing, chills or hemoptysis; No Shortness of Breath  CARDIOVASCULAR: No chest pain, palpitations, passing out, dizziness, or leg swelling  GASTROINTESTINAL: + abdominal or epigastric pain. No nausea, vomiting, or hematemesis; No diarrhea or constipation. No melena or hematochezia.  GENITOURINARY: No dysuria, frequency, hematuria, or incontinence  NEUROLOGICAL: No headaches, memory loss, loss of strength, numbness, or tremors  SKIN: No itching, burning, rashes, or lesions   LYMPH Nodes: No enlarged glands  ENDOCRINE: No heat or cold intolerance; No hair loss  MUSCULOSKELETAL: No joint pain or swelling; No muscle, back, or extremity pain  PSYCHIATRIC: No depression, anxiety, mood swings, or difficulty sleeping  HEME/LYMPH: No easy bruising, or bleeding gums  ALLERGY AND IMMUNOLOGIC: No hives or eczema	      PHYSICAL EXAM:  T(C): 36.3 (12-20-20 @ 05:43), Max: 36.3 (12-19-20 @ 14:14)  HR: 58 (12-20-20 @ 05:43) (58 - 65)  BP: 105/55 (12-20-20 @ 05:43) (97/57 - 114/68)  RR: 17 (12-20-20 @ 05:43) (16 - 17)  SpO2: 96% (12-20-20 @ 05:43) (94% - 99%)        Appearance: Normal	  HEENT:   Normal oral mucosa, PERRL, EOMI	  Lymphatic: No lymphadenopathy  Cardiovascular: Normal S1 S2, No JVD, No murmurs, No edema  Respiratory: Lungs clear to auscultation	  Psychiatry: A & O x 3, Mood & affect appropriate  Gastrointestinal:  Soft, Non-tender, + BS	  Skin: No rashes, No ecchymoses, No cyanosis	  Neurologic: Non-focal  Extremities: Normal range of motion, No clubbing, cyanosis or edema  Vascular: Peripheral pulses palpable 2+ bilaterally    MEDICATIONS  (STANDING):  ascorbic acid 1000 milliGRAM(s) Oral daily  cefTRIAXone   IVPB 1000 milliGRAM(s) IV Intermittent every 24 hours  cholecalciferol 1000 Unit(s) Oral daily  enoxaparin Injectable 40 milliGRAM(s) SubCutaneous daily  gemfibrozil 600 milliGRAM(s) Oral two times a day  metroNIDAZOLE  IVPB 500 milliGRAM(s) IV Intermittent every 8 hours  metroNIDAZOLE  IVPB      montelukast 10 milliGRAM(s) Oral every 24 hours  pantoprazole  Injectable 40 milliGRAM(s) IV Push two times a day  zinc sulfate 220 milliGRAM(s) Oral daily        	  LABS:	 	  CARDIAC MARKERS ( 19 Dec 2020 08:41 )  x     / x     / 80 U/L / x     / x                                    15.1   5.10  )-----------( 313      ( 20 Dec 2020 07:10 )             45.3     12-20    139  |  106  |  10  ----------------------------<  110<H>  4.0   |  27  |  1.15    Ca    10.0      20 Dec 2020 07:10    TPro  7.9  /  Alb  3.6  /  TBili  0.9  /  DBili  x   /  AST  49<H>  /  ALT  103<H>  /  AlkPhos  84  12-20    Lipid Profile: Cholesterol 174  LDL --  HDL 28      HgA1c:   TSH: Thyroid Stimulating Hormone, Serum: 1.64 uU/mL (12-18 @ 06:05)

## 2020-12-20 NOTE — PROGRESS NOTE ADULT - ASSESSMENT
Patient is a 30y old  Male who works in a [employer], was tested positive for covid 19 on 12/10/20 and now presenting with shortness of breath and blood in stool. Patient was seen in the ED on Friday with same symptoms and was discharged ,He is coming back with hematemesis, diarrhea and blood in stools. Patient endorses severe chest pain that is parasternal localized , increases with eating. Patient also endorses vomiting whenever he tries to eat and loss of appetite. Patient reports that he was saturating around 85% at home, however, in the hospital he is saturating 95% on RA and not in Respiratory distress.  Off note patient has history of constipation and was found to have polyp few years ago, On admission, he found to have no fever, no leukocytosis but tachycardia and positive COVID test. The CXR and Troponin is negative. The ID consult requested to assist with further management.     # COVID infection- currently saturating well at Room air, No indication of Dexamethasone OR Remdesivir   # Nonspecific Colitis OR underdistention  ON CT abd/pelvis shows   Apparent segmental mural thickening of the ascending colon may be due to underdistention or nonspecific colitis.     would recommend:    1. Continue Ceftriaxone and Flagyl   2. Monitor oxygen saturation closely and start on IV dexamethasone 6 mg daily and Remdesivir if saturation fall to 93 % or less  3. Continue Supportive care, antiemetic agents   4. Continue Bronchodilator as needed  5. COVID precautions     d/w  Nursing staff     Attending Attestation:    Spent more than 45 minutes on total encounter, more than 50 % of the visit was spent counseling and/or coordinating care by the Attending physician. Patient is a 30y old  Male who works in a [employer], was tested positive for covid 19 on 12/10/20 and now presenting with shortness of breath and blood in stool. Patient was seen in the ED on Friday with same symptoms and was discharged ,He is coming back with hematemesis, diarrhea and blood in stools. Patient endorses severe chest pain that is parasternal localized , increases with eating. Patient also endorses vomiting whenever he tries to eat and loss of appetite. Patient reports that he was saturating around 85% at home, however, in the hospital he is saturating 95% on RA and not in Respiratory distress.  Off note patient has history of constipation and was found to have polyp few years ago, On admission, he found to have no fever, no leukocytosis but tachycardia and positive COVID test. The CXR and Troponin is negative. The ID consult requested to assist with further management.     # COVID infection- currently saturating well at Room air, No indication of Dexamethasone OR Remdesivir   # Nonspecific Colitis OR underdistention  ON CT abd/pelvis shows   Apparent segmental mural thickening of the ascending colon may be due to underdistention or nonspecific colitis.     would recommend:    1.Continue Supportive care, antiemetic agents   2.  Continue Ceftriaxone and Flagyl   3. Monitor oxygen saturation closely and start on IV dexamethasone 6 mg daily and Remdesivir if saturation fall to 93 % or less  4. Continue Bronchodilator as needed  5. COVID precautions     d/w  Dr. Mclean and Nursing staff     Attending Attestation:    Spent more than 45 minutes on total encounter, more than 50 % of the visit was spent counseling and/or coordinating care by the Attending physician.

## 2020-12-20 NOTE — CONSULT NOTE ADULT - ASSESSMENT
1. Colitis  2. Hematochezia      Etiology can be due to colitis or hemorrhoids  3. Hematemesis      Etiology can be due to Peptic ulcer disease or Mily Solorio tear  4. COVID-19 positive  5. Elevated LFT's can be due to viral hepatitis    Suggestions:    1. Monitor H/H  2. Transfuse PRBC as needed  3. Avoid NSAID  4. Protonix 40mg daily  5. Check stool for culture if diarrhea  6. Check hepatitis serology for B and C  7. DVT prophylaxis

## 2020-12-20 NOTE — CONSULT NOTE ADULT - GASTROINTESTINAL DETAILS
soft/nontender/no distention/no masses palpable/bowel sounds normal/no bruit/no rebound tenderness/no guarding/no rigidity/no organomegaly

## 2020-12-20 NOTE — CONSULT NOTE ADULT - ENT GEN HX ROS MEA POS PC
nasal discharge/nasal obstruction/gum bleeding/dry mouth/throat pain/dysphagia/epistaxis/sore throat

## 2020-12-20 NOTE — CONSULT NOTE ADULT - NEGATIVE MUSCULOSKELETAL SYMPTOMS
no myalgia/no muscle cramps/no muscle weakness/no stiffness/no neck pain/no arm pain L/no arm pain R/no back pain/no leg pain L/no leg pain R

## 2020-12-20 NOTE — CONSULT NOTE ADULT - NEGATIVE ENMT SYMPTOMS
no hearing difficulty/no ear pain/no tinnitus/no vertigo/no gum bleeding/no dry mouth/no throat pain/no dysphagia

## 2020-12-20 NOTE — PROGRESS NOTE ADULT - SUBJECTIVE AND OBJECTIVE BOX
Patient is seen and examined at the bed side, is afebrile. He remains OFF oxygen. but still c/o nausea.        REVIEW OF SYSTEMS: All other review systems are negative      ALLERGIES: No Known Allergies        PHYSICAL EXAM:  Vital Signs Last 24 Hrs  T(C): 36.3 (20 Dec 2020 05:43), Max: 36.3 (19 Dec 2020 14:14)  T(F): 97.4 (20 Dec 2020 05:43), Max: 97.4 (19 Dec 2020 14:14)  HR: 58 (20 Dec 2020 05:43) (58 - 65)  BP: 105/55 (20 Dec 2020 05:43) (97/57 - 114/68)  BP(mean): --  RR: 17 (20 Dec 2020 05:43) (16 - 17)  SpO2: 96% (20 Dec 2020 05:43) (94% - 99%)      LABS:                                   15.1   5.10  )-----------( 313      ( 20 Dec 2020 07:10 )             45.3                14.5   5.20  )-----------( 293      ( 19 Dec 2020 08:41 )             43.2           12-20    139  |  106  |  10  ----------------------------<  110<H>  4.0   |  27  |  1.15    Ca    10.0      20 Dec 2020 07:10    TPro  7.9  /  Alb  3.6  /  TBili  0.9  /  DBili  x   /  AST  49<H>  /  ALT  103<H>  /  AlkPhos  84  12-20 12-19    141  |  107  |  11  ----------------------------<  113<H>  4.1   |  27  |  1.06    Ca    9.7      19 Dec 2020 08:41  Phos  3.1     12-18  Mg     2.1     12-18    TPro  7.3  /  Alb  3.4<L>  /  TBili  1.0  /  DBili  x   /  AST  46<H>  /  ALT  75<H>  /  AlkPhos  79  12-19        MEDICATIONS  (STANDING):    ascorbic acid 1000 milliGRAM(s) Oral daily  cefTRIAXone   IVPB 1000 milliGRAM(s) IV Intermittent every 24 hours  cholecalciferol 1000 Unit(s) Oral daily  enoxaparin Injectable 40 milliGRAM(s) SubCutaneous daily  gemfibrozil 600 milliGRAM(s) Oral two times a day  metroNIDAZOLE  IVPB 500 milliGRAM(s) IV Intermittent every 8 hours  metroNIDAZOLE  IVPB      montelukast 10 milliGRAM(s) Oral every 24 hours  pantoprazole  Injectable 40 milliGRAM(s) IV Push two times a day  zinc sulfate 220 milliGRAM(s) Oral daily      RADIOLOGY & ADDITIONAL TESTS:    12/18/20: CT Abd/pelvis and Chest w/ Oral Cont and w/ IV Cont (12.18.20 @ 19:08) Nodular densities in both lungs and some are ground glass in appearance. Findings may represent pneumonia/pneumonitis.  Clinical correlation is recommended. Follow-up chest CT may be pursued in 4-6 weeks to ensure resolution.  New hepatic steatosis.   Splenomegaly.   Apparent segmental mural thickening of the ascending colon may be due to underdistention or nonspecific colitis. Clinical correlation is recommended.      12/17/20 : Xray Chest 1 View- PORTABLE-Urgent (12.17.20 @ 14:41) The lung fields and pleural  surfaces are unremarkable.        MICROBIOLOGY DATA:    C-Reactive Protein, Serum (12.18.20 @ 09:14)   C-Reactive Protein, Serum: 0.33 mg/dL Ferritin, Serum (12.18.20 @ 09:12)   Ferritin, Serum: 783 ng/mL     D-Dimer Assay, Quantitative (12.17.20 @ 14:29)  <150 ng/mL DDU   Respiratory Viral Panel with COVID-19 by JERRY (12.17.20 @ 15:11)   Rapid RVP Result: Detected   SARS-CoV-2: Detected:                    Patient is seen and examined at the bed side, is afebrile. The nausea has not resolved. He remains OFF oxygen.       REVIEW OF SYSTEMS: All other review systems are negative      ALLERGIES: No Known Allergies      PHYSICAL EXAM:  Vital Signs Last 24 Hrs  T(C): 36.3 (20 Dec 2020 05:43), Max: 36.3 (19 Dec 2020 14:14)  T(F): 97.4 (20 Dec 2020 05:43), Max: 97.4 (19 Dec 2020 14:14)  HR: 58 (20 Dec 2020 05:43) (58 - 65)  BP: 105/55 (20 Dec 2020 05:43) (97/57 - 114/68)  BP(mean): --  RR: 17 (20 Dec 2020 05:43) (16 - 17)  SpO2: 96% (20 Dec 2020 05:43) (94% - 99%)      LABS:                                   15.1   5.10  )-----------( 313      ( 20 Dec 2020 07:10 )             45.3                14.5   5.20  )-----------( 293      ( 19 Dec 2020 08:41 )             43.2           12-20    139  |  106  |  10  ----------------------------<  110<H>  4.0   |  27  |  1.15    Ca    10.0      20 Dec 2020 07:10    TPro  7.9  /  Alb  3.6  /  TBili  0.9  /  DBili  x   /  AST  49<H>  /  ALT  103<H>  /  AlkPhos  84  12-20 12-19    141  |  107  |  11  ----------------------------<  113<H>  4.1   |  27  |  1.06    Ca    9.7      19 Dec 2020 08:41  Phos  3.1     12-18  Mg     2.1     12-18    TPro  7.3  /  Alb  3.4<L>  /  TBili  1.0  /  DBili  x   /  AST  46<H>  /  ALT  75<H>  /  AlkPhos  79  12-19        MEDICATIONS  (STANDING):    ascorbic acid 1000 milliGRAM(s) Oral daily  cefTRIAXone   IVPB 1000 milliGRAM(s) IV Intermittent every 24 hours  cholecalciferol 1000 Unit(s) Oral daily  enoxaparin Injectable 40 milliGRAM(s) SubCutaneous daily  gemfibrozil 600 milliGRAM(s) Oral two times a day  metroNIDAZOLE  IVPB 500 milliGRAM(s) IV Intermittent every 8 hours  metroNIDAZOLE  IVPB      montelukast 10 milliGRAM(s) Oral every 24 hours  pantoprazole  Injectable 40 milliGRAM(s) IV Push two times a day  zinc sulfate 220 milliGRAM(s) Oral daily      RADIOLOGY & ADDITIONAL TESTS:    12/18/20: CT Abd/pelvis and Chest w/ Oral Cont and w/ IV Cont (12.18.20 @ 19:08) Nodular densities in both lungs and some are ground glass in appearance. Findings may represent pneumonia/pneumonitis.  Clinical correlation is recommended. Follow-up chest CT may be pursued in 4-6 weeks to ensure resolution.  New hepatic steatosis.   Splenomegaly.   Apparent segmental mural thickening of the ascending colon may be due to underdistention or nonspecific colitis. Clinical correlation is recommended.      12/17/20 : Xray Chest 1 View- PORTABLE-Urgent (12.17.20 @ 14:41) The lung fields and pleural  surfaces are unremarkable.        MICROBIOLOGY DATA:    C-Reactive Protein, Serum (12.18.20 @ 09:14)   C-Reactive Protein, Serum: 0.33 mg/dL Ferritin, Serum (12.18.20 @ 09:12)   Ferritin, Serum: 783 ng/mL     D-Dimer Assay, Quantitative (12.17.20 @ 14:29)  <150 ng/mL DDU   Respiratory Viral Panel with COVID-19 by JERRY (12.17.20 @ 15:11)   Rapid RVP Result: Detected   SARS-CoV-2: Detected:

## 2020-12-20 NOTE — PROGRESS NOTE ADULT - SUBJECTIVE AND OBJECTIVE BOX
FRANKO GUNN  MR# [id number]  30yMale        Patient is a 30y old  Male who presents with a chief complaint of Covid infection (20 Dec 2020 12:06)      INTERVAL HPI/OVERNIGHT EVENTS:  Patient seen and examined at bedside. No notations of chest pain, palpitation, SOB, orthopnea, nausea, vomiting or abdominal pain.    ALLERGIES  No Known Allergies      MEDICATIONS  acetaminophen   Tablet .. 650 milliGRAM(s) Oral every 6 hours PRN Temp greater or equal to 38C (100.4F), Mild Pain (1 - 3)  ALBUTerol    90 MICROgram(s) HFA Inhaler 2 Puff(s) Inhalation every 6 hours PRN Shortness of Breath  aluminum hydroxide/magnesium hydroxide/simethicone Suspension 30 milliLiter(s) Oral every 8 hours PRN Dyspepsia  ascorbic acid 1000 milliGRAM(s) Oral daily  cefTRIAXone   IVPB 1000 milliGRAM(s) IV Intermittent every 24 hours  cholecalciferol 1000 Unit(s) Oral daily  enoxaparin Injectable 40 milliGRAM(s) SubCutaneous daily  gemfibrozil 600 milliGRAM(s) Oral two times a day  guaiFENesin   Syrup  (Sugar-Free) 100 milliGRAM(s) Oral every 6 hours PRN Cough  metroNIDAZOLE  IVPB 500 milliGRAM(s) IV Intermittent every 8 hours  metroNIDAZOLE  IVPB      montelukast 10 milliGRAM(s) Oral every 24 hours  morphine  - Injectable 2 milliGRAM(s) IV Push every 6 hours PRN Moderate Pain (4 - 6)  morphine  - Injectable 4 milliGRAM(s) IV Push every 6 hours PRN Severe Pain (7 - 10)  pantoprazole  Injectable 40 milliGRAM(s) IV Push two times a day  zinc sulfate 220 milliGRAM(s) Oral daily              REVIEW OF SYSTEMS:  CONSTITUTIONAL: No fever, weight loss, or fatigue  EYES: No eye pain, visual disturbances, or discharge  ENT:  No difficulty hearing, tinnitus, vertigo; No sinus or throat pain  NECK: No pain or stiffness  RESPIRATORY: No cough, wheezing, chills or hemoptysis; No Shortness of Breath  CARDIOVASCULAR: No chest pain, palpitations, passing out, dizziness, or leg swelling  GASTROINTESTINAL: No abdominal or epigastric pain. No nausea, vomiting, or hematemesis; No diarrhea or constipation. No melena or hematochezia.  GENITOURINARY: No dysuria, frequency, hematuria, or incontinence  NEUROLOGICAL: No headaches, memory loss, loss of strength, numbness, or tremors  SKIN: No itching, burning, rashes, or lesions   LYMPH Nodes: No enlarged glands  ENDOCRINE: No heat or cold intolerance; No hair loss  MUSCULOSKELETAL: No joint pain or swelling; No muscle, back, or extremity pain  PSYCHIATRIC: No depression, anxiety, mood swings, or difficulty sleeping  HEME/LYMPH: No easy bruising, or bleeding gums  ALLERGY AND IMMUNOLOGIC: No hives or eczema	    [ ] All others negative	  [ ] Unable to obtain      T(C): 36.3 (20 @ 05:43), Max: 36.3 (20 @ 14:14)  T(F): 97.4 (20 @ :43), Max: 97.4 (20 @ 14:14)  HR: 58 (20 @ 05:43) (58 - 65)  BP: 105/55 (20 @ 05:43) (97/57 - 114/68)  RR: 17 (20 @ 05:43) (16 - 17)  SpO2: 96% (20 @ 05:43) (94% - 99%)  Wt(kg): --    I&O's Summary        PHYSICAL EXAM:  A X O x  HEAD:  Atraumatic, Normocephalic  EYES: EOMI, PERRLA, conjunctiva and sclera clear  NECK: Supple, No JVD, Normal thyroid  Resp: CTAB, No crackles, wheezing,   CVS: Regular rate and rhythm; No discernable murmurs, rubs, or gallops  ABD: Soft, Nontender, Nondistended; Bowel sounds present  EXTREMITIES:  2+ Peripheral Pulses, No edema  LYMPH: No dicernable lymphadenopathy noted  GENERAL: NAD, well-groomed, well-developed      LABS:                        15.1   5.10  )-----------( 313      ( 20 Dec 2020 07:10 )             45.3         139  |  106  |  10  ----------------------------<  110<H>  4.0   |  27  |  1.15    Ca    10.0      20 Dec 2020 07:10    TPro  7.9  /  Alb  3.6  /  TBili  0.9  /  DBili  x   /  AST  49<H>  /  ALT  103<H>  /  AlkPhos  84  12-20      Urinalysis Basic - ( 19 Dec 2020 16:22 )    Color: Yellow / Appearance: Clear / S.010 / pH: x  Gluc: x / Ketone: Negative  / Bili: Negative / Urobili: Negative   Blood: x / Protein: 15 / Nitrite: Negative   Leuk Esterase: Negative / RBC: 0-2 /HPF / WBC 3-5 /HPF   Sq Epi: x / Non Sq Epi: Few /HPF / Bacteria: Moderate /HPF      CAPILLARY BLOOD GLUCOSE          Troponins:  ProBNP:  Lipid Profile:   HgA1c:  TSH:           RADIOLOGY & ADDITIONAL TESTS:    Imaging Personally Reviewed:  [ ] YES  [ ] NO      Consultant(s) Notes Reviewed:  [x ] YES  [ ] NO    Care Discussed with Consultants/Other Providers [ x] YES  [ ] NO          PAST MEDICAL & SURGICAL HISTORY:  Colonic polyp    No pertinent past medical history          Infection due to severe acute respiratory syndrome coronavirus 2 (SARS-CoV-2)    Handoff    MEWS Score    Colonic polyp    No pertinent past medical history    No pertinent past medical history    COVID-19    Prophylactic measure    Chest pain    Blood in stool    COVID-19    W/CHEST PAIN    5    Chest pain    Blood in stool    Hemoptysis    SOB (shortness of breath) on exertion    [email or web address]

## 2020-12-20 NOTE — CONSULT NOTE ADULT - NEGATIVE NEUROLOGICAL SYMPTOMS
no syncope/no tremors/no vertigo/no loss of sensation/no difficulty walking/no headache/no loss of consciousness

## 2020-12-20 NOTE — PROGRESS NOTE ADULT - ASSESSMENT
30 yr old male pt who works in a [employer], was tested positive for covid 19 on 12/10/20 is presenting with shortness of breath and blood in stool. Patient is being admitted for Covid-19 infection and GI symptoms

## 2020-12-20 NOTE — CONSULT NOTE ADULT - NEGATIVE PSYCHIATRIC SYMPTOMS
no suicidal ideation/no depression/no anxiety/no insomnia/no memory loss/no paranoia/no mood swings/no agitation

## 2020-12-20 NOTE — PROGRESS NOTE ADULT - ASSESSMENT
29 YO pt who works in a [employer], was tested positive for covid 19 on 12/10/20 is presenting with shortness of breath and blood in stool. Patient was seen in the ED on friday with same symptoms and was discharged ,He is coming back with hematemesis, diarrhea and blood in stools,chest pain and shortness of breath,COVID+ pneumonia.  1.Echocardiogram as outpatient.  2.Prediabetes-diet.  3.Lipid d/o-add lopid 600mg bid.  4.Abdominal pain-GI eval.  5.COVID+-abx,zinc,singulair,ID f/u..  6.GI and DVT prophylaxis.

## 2020-12-21 ENCOUNTER — TRANSCRIPTION ENCOUNTER (OUTPATIENT)
Age: 30
End: 2020-12-21

## 2020-12-21 DIAGNOSIS — K52.9 NONINFECTIVE GASTROENTERITIS AND COLITIS, UNSPECIFIED: ICD-10-CM

## 2020-12-21 LAB
ALBUMIN SERPL ELPH-MCNC: 3.4 G/DL — LOW (ref 3.5–5)
ALP SERPL-CCNC: 78 U/L — SIGNIFICANT CHANGE UP (ref 40–120)
ALT FLD-CCNC: 89 U/L DA — HIGH (ref 10–60)
ANION GAP SERPL CALC-SCNC: 9 MMOL/L — SIGNIFICANT CHANGE UP (ref 5–17)
AST SERPL-CCNC: 38 U/L — SIGNIFICANT CHANGE UP (ref 10–40)
BASOPHILS # BLD AUTO: 0.02 K/UL — SIGNIFICANT CHANGE UP (ref 0–0.2)
BASOPHILS NFR BLD AUTO: 0.4 % — SIGNIFICANT CHANGE UP (ref 0–2)
BILIRUB SERPL-MCNC: 0.8 MG/DL — SIGNIFICANT CHANGE UP (ref 0.2–1.2)
BUN SERPL-MCNC: 9 MG/DL — SIGNIFICANT CHANGE UP (ref 7–18)
CALCIUM SERPL-MCNC: 9.8 MG/DL — SIGNIFICANT CHANGE UP (ref 8.4–10.5)
CHLORIDE SERPL-SCNC: 105 MMOL/L — SIGNIFICANT CHANGE UP (ref 96–108)
CO2 SERPL-SCNC: 26 MMOL/L — SIGNIFICANT CHANGE UP (ref 22–31)
CREAT SERPL-MCNC: 1.13 MG/DL — SIGNIFICANT CHANGE UP (ref 0.5–1.3)
CRP SERPL-MCNC: 0.25 MG/DL — SIGNIFICANT CHANGE UP (ref 0–0.4)
EOSINOPHIL # BLD AUTO: 0.11 K/UL — SIGNIFICANT CHANGE UP (ref 0–0.5)
EOSINOPHIL NFR BLD AUTO: 1.9 % — SIGNIFICANT CHANGE UP (ref 0–6)
GLUCOSE SERPL-MCNC: 108 MG/DL — HIGH (ref 70–99)
HCT VFR BLD CALC: 41.7 % — SIGNIFICANT CHANGE UP (ref 39–50)
HGB BLD-MCNC: 14.1 G/DL — SIGNIFICANT CHANGE UP (ref 13–17)
IMM GRANULOCYTES NFR BLD AUTO: 0.5 % — SIGNIFICANT CHANGE UP (ref 0–1.5)
LYMPHOCYTES # BLD AUTO: 1.22 K/UL — SIGNIFICANT CHANGE UP (ref 1–3.3)
LYMPHOCYTES # BLD AUTO: 21.6 % — SIGNIFICANT CHANGE UP (ref 13–44)
MCHC RBC-ENTMCNC: 28.4 PG — SIGNIFICANT CHANGE UP (ref 27–34)
MCHC RBC-ENTMCNC: 33.8 GM/DL — SIGNIFICANT CHANGE UP (ref 32–36)
MCV RBC AUTO: 83.9 FL — SIGNIFICANT CHANGE UP (ref 80–100)
MONOCYTES # BLD AUTO: 0.5 K/UL — SIGNIFICANT CHANGE UP (ref 0–0.9)
MONOCYTES NFR BLD AUTO: 8.8 % — SIGNIFICANT CHANGE UP (ref 2–14)
NEUTROPHILS # BLD AUTO: 3.77 K/UL — SIGNIFICANT CHANGE UP (ref 1.8–7.4)
NEUTROPHILS NFR BLD AUTO: 66.8 % — SIGNIFICANT CHANGE UP (ref 43–77)
NRBC # BLD: 0 /100 WBCS — SIGNIFICANT CHANGE UP (ref 0–0)
PLATELET # BLD AUTO: 282 K/UL — SIGNIFICANT CHANGE UP (ref 150–400)
POTASSIUM SERPL-MCNC: 3.7 MMOL/L — SIGNIFICANT CHANGE UP (ref 3.5–5.3)
POTASSIUM SERPL-SCNC: 3.7 MMOL/L — SIGNIFICANT CHANGE UP (ref 3.5–5.3)
PROT SERPL-MCNC: 7.1 G/DL — SIGNIFICANT CHANGE UP (ref 6–8.3)
RBC # BLD: 4.97 M/UL — SIGNIFICANT CHANGE UP (ref 4.2–5.8)
RBC # FLD: 12.9 % — SIGNIFICANT CHANGE UP (ref 10.3–14.5)
SODIUM SERPL-SCNC: 140 MMOL/L — SIGNIFICANT CHANGE UP (ref 135–145)
WBC # BLD: 5.65 K/UL — SIGNIFICANT CHANGE UP (ref 3.8–10.5)
WBC # FLD AUTO: 5.65 K/UL — SIGNIFICANT CHANGE UP (ref 3.8–10.5)

## 2020-12-21 RX ORDER — ACETAMINOPHEN 500 MG
1000 TABLET ORAL ONCE
Refills: 0 | Status: COMPLETED | OUTPATIENT
Start: 2020-12-21 | End: 2020-12-21

## 2020-12-21 RX ORDER — METOCLOPRAMIDE HCL 10 MG
10 TABLET ORAL EVERY 8 HOURS
Refills: 0 | Status: COMPLETED | OUTPATIENT
Start: 2020-12-21 | End: 2020-12-22

## 2020-12-21 RX ADMIN — ZINC SULFATE TAB 220 MG (50 MG ZINC EQUIVALENT) 220 MILLIGRAM(S): 220 (50 ZN) TAB at 11:05

## 2020-12-21 RX ADMIN — PANTOPRAZOLE SODIUM 40 MILLIGRAM(S): 20 TABLET, DELAYED RELEASE ORAL at 05:47

## 2020-12-21 RX ADMIN — MORPHINE SULFATE 4 MILLIGRAM(S): 50 CAPSULE, EXTENDED RELEASE ORAL at 22:22

## 2020-12-21 RX ADMIN — Medication 650 MILLIGRAM(S): at 22:22

## 2020-12-21 RX ADMIN — Medication 100 MILLIGRAM(S): at 13:53

## 2020-12-21 RX ADMIN — Medication 600 MILLIGRAM(S): at 05:47

## 2020-12-21 RX ADMIN — Medication 650 MILLIGRAM(S): at 21:35

## 2020-12-21 RX ADMIN — Medication 100 MILLIGRAM(S): at 21:13

## 2020-12-21 RX ADMIN — MONTELUKAST 10 MILLIGRAM(S): 4 TABLET, CHEWABLE ORAL at 21:13

## 2020-12-21 RX ADMIN — Medication 100 MILLIGRAM(S): at 05:54

## 2020-12-21 RX ADMIN — Medication 400 MILLIGRAM(S): at 11:06

## 2020-12-21 RX ADMIN — MORPHINE SULFATE 4 MILLIGRAM(S): 50 CAPSULE, EXTENDED RELEASE ORAL at 21:35

## 2020-12-21 RX ADMIN — ONDANSETRON 4 MILLIGRAM(S): 8 TABLET, FILM COATED ORAL at 21:35

## 2020-12-21 RX ADMIN — MORPHINE SULFATE 4 MILLIGRAM(S): 50 CAPSULE, EXTENDED RELEASE ORAL at 10:05

## 2020-12-21 RX ADMIN — Medication 100 MILLIGRAM(S): at 05:48

## 2020-12-21 RX ADMIN — Medication 1000 MILLIGRAM(S): at 11:30

## 2020-12-21 RX ADMIN — Medication 1000 MILLIGRAM(S): at 11:05

## 2020-12-21 RX ADMIN — Medication 1000 UNIT(S): at 11:05

## 2020-12-21 RX ADMIN — ENOXAPARIN SODIUM 40 MILLIGRAM(S): 100 INJECTION SUBCUTANEOUS at 11:05

## 2020-12-21 RX ADMIN — Medication 600 MILLIGRAM(S): at 17:03

## 2020-12-21 RX ADMIN — ONDANSETRON 4 MILLIGRAM(S): 8 TABLET, FILM COATED ORAL at 09:46

## 2020-12-21 RX ADMIN — MORPHINE SULFATE 4 MILLIGRAM(S): 50 CAPSULE, EXTENDED RELEASE ORAL at 09:46

## 2020-12-21 RX ADMIN — CEFTRIAXONE 100 MILLIGRAM(S): 500 INJECTION, POWDER, FOR SOLUTION INTRAMUSCULAR; INTRAVENOUS at 19:10

## 2020-12-21 RX ADMIN — PANTOPRAZOLE SODIUM 40 MILLIGRAM(S): 20 TABLET, DELAYED RELEASE ORAL at 17:03

## 2020-12-21 NOTE — PROGRESS NOTE ADULT - PROBLEM SELECTOR PLAN 1
CT chest showed nodular densities in both lungs and some are groundglass in appearance concerning for pneumonia/pneumonitis.   saturating 95% room air  continue symptomatic treatment  monitor oxygen saturation, supplemental oxygen as needed   start on IV dexamethasone 6 mg daily and Remdesivir if saturation fall to 93 %  continue with montelukast and bronchodilators   c/o Vitamin D, Zinc and Vitamin C   ID on board Dr Perea

## 2020-12-21 NOTE — DISCHARGE NOTE PROVIDER - NSDCMRMEDTOKEN_GEN_ALL_CORE_FT
acetaminophen 500 mg oral tablet: 2 tab(s) orally every 6 hours   ibuprofen 600 mg oral tablet: 1 tab(s) orally every 6 hours    albuterol 90 mcg/inh inhalation aerosol: 2 puff(s) inhaled every 6 hours, As needed, Shortness of Breath  ascorbic acid 1000 mg oral tablet: 1 tab(s) orally once a day  cholecalciferol oral tablet: 1000 unit(s) orally once a day  guaiFENesin 100 mg/5 mL oral liquid: 5 milliliter(s) orally every 6 hours, As Needed -for cough   Protonix 40 mg oral delayed release tablet: 1 tab(s) orally 2 times a day   Vitamin D3 1000 intl units (25 mcg) oral capsule: 1  orally once a day   zinc sulfate 220 mg oral capsule: 1 cap(s) orally once a day   albuterol 90 mcg/inh inhalation aerosol: 2 puff(s) inhaled every 6 hours, As needed, Shortness of Breath  ascorbic acid 1000 mg oral tablet: 1 tab(s) orally once a day  cholecalciferol oral tablet: 1000 unit(s) orally once a day  Eliquis 2.5 mg oral tablet: 1 tab(s) orally 2 times a day   gemfibrozil 600 mg oral tablet: 1 tab(s) orally 2 times a day  guaiFENesin 100 mg/5 mL oral liquid: 5 milliliter(s) orally every 6 hours, As Needed -for cough   predniSONE 10 mg oral tablet: 3 tab(s) orally once a day x 3 days  2 tab(s) orally once a day x 3 days  1 tab(s) orally once a day x 3 days  Protonix 40 mg oral delayed release tablet: 1 tab(s) orally 2 times a day   zinc sulfate 220 mg oral capsule: 1 cap(s) orally once a day

## 2020-12-21 NOTE — PROGRESS NOTE ADULT - ASSESSMENT
1. Colitis  2. Hematochezia stopped   3. Hematemesis stopped   4. COVID-19 positive  5. Elevated LFT's  improving    Suggestions:    1. Monitor H/H  2. Transfuse PRBC as needed  3. Avoid NSAID  4. Protonix 40mg daily  5. Check stool for culture if diarrhea  6. Check hepatitis serology for B and C  7. Follow up LFT's  8. DVT prophylaxis

## 2020-12-21 NOTE — DISCHARGE NOTE PROVIDER - PROVIDER TOKENS
PROVIDER:[TOKEN:[[id number]],FOLLOWUP:[1 week]],PROVIDER:[TOKEN:[[id number]],FOLLOWUP:[2 weeks]]

## 2020-12-21 NOTE — DISCHARGE NOTE PROVIDER - CARE PROVIDER_API CALL
Espinoza Mclean)  Medicine  [address] [address]  Phone: [phone]  Fax: [phone]  Follow Up Time: 1 week    Yashira Hyman  INTERNAL MEDICINE  [address] [place] [address]  Phone: [phone]  Fax: [phone]  Follow Up Time: 2 weeks   vital signs

## 2020-12-21 NOTE — PROGRESS NOTE ADULT - ASSESSMENT
29 y/o   male who  tested positive for covid  on 12/10/20 presented  with shortness of breath and blood in stool. Patient was seen in the ED on 12/17  and  discharged. Pt return to the ED with abdominal pain,   diarrhea and blood in stools.   On admission pt was positive for  COVID,  , Troponin is negative. EKG NSR   CT scan of abd showed nonspecific colitis.   CT chest showed nodular densities in both lungs and some are groundglass in appearance concerning for pneumonia/pneumonitis.   Started on Ceftriaxone and Flagyl   Pt seen at bedside, c/o abdominal pain, feeling bloated, nausea, vomited ( bilious) once this morning after trying liquid diet. Also complaints of body aches and headache/ pressure in ears   Saturating 95 % on RA. No difficulties breathing. No fever or leucocytosis

## 2020-12-21 NOTE — PROGRESS NOTE ADULT - SUBJECTIVE AND OBJECTIVE BOX
[   ] ICU                                          [   ] CCU                                      [ X  ] Medical Floor      30 year old male who works in a [employer], was tested positive for COVID-19 on 12/10/20 is presenting with shortness of breath and blood in stool. Patient was seen in the emergency room on Friday with same symptoms and was discharged ,He is coming back with hematemesis, diarrhea and blood in stools. Patient endorses severe chest pain that is parasternal localized increases with eating. Patient also endorses vomiting whenever he tries to eat and loss of appetite. Patient reports that he was saturating around 85% at home, however, in the hospital he is saturating 95% on RA and not in Respiratory distress.     Today patient appears comfortable and no new complaints reported, No abdominal pain, N/V, hematemesis, hematochezia, melena, fever, chills, chest pain, SOB, cough or diarrhea reported.       PAIN MANAGEMENT:  Pain Scale:                0 /10  Pain Location:      Prior Colonoscopy:  Unknown    PAST MEDICAL HISTORY  Colonic polyp       PAST SURGICAL HISTORY  No significant surgical history      Allergies    No Known Allergies    Intolerances  None    SOCIAL HISTORY  Advanced Directives:       [ X ] Full Code       [  ] DNR  Marital Status:         [  ] M      [ X ] S      [  ] D       [  ] W  Children:       [ X ] Yes      [  ] No  Occupation:        [  ] Employed       [ X ] Unemployed       [  ] Retired  Diet:       [ X ] Regular       [  ] PEG feeding          [  ] NG tube feeding  Drug Use:           [ X ] Patient denied          [  ] Yes  Alcohol:           [X  ] No             [  ] Yes (socially)         [  ] Yes (chronic)  Tobacco:           [  ] Yes           [ X ] No    FAMILY HISTORY  [X  ] Heart Disease            [X  ] Diabetes             [ X ] HTN             [  ] Colon Cancer             [  ] Stomach Cancer              [  ] Pancreatic Cancer      VITALS  Vital Signs Last 24 Hrs  T(C): 36.2 (21 Dec 2020 14:43), Max: 36.3 (20 Dec 2020 20:52)  T(F): 97.2 (21 Dec 2020 14:43), Max: 97.4 (21 Dec 2020 05:39)  HR: 85 (21 Dec 2020 14:43) (71 - 85)  BP: 118/90 (21 Dec 2020 14:43) (108/70 - 137/88)   RR: 18 (21 Dec 2020 14:43) (16 - 18)  SpO2: 98% (21 Dec 2020 14:43) (94% - 100%)       MEDICATIONS  (STANDING):  ascorbic acid 1000 milliGRAM(s) Oral daily  cefTRIAXone   IVPB 1000 milliGRAM(s) IV Intermittent every 24 hours  cholecalciferol 1000 Unit(s) Oral daily  enoxaparin Injectable 40 milliGRAM(s) SubCutaneous daily  gemfibrozil 600 milliGRAM(s) Oral two times a day  metroNIDAZOLE  IVPB 500 milliGRAM(s) IV Intermittent every 8 hours  metroNIDAZOLE  IVPB      montelukast 10 milliGRAM(s) Oral every 24 hours  pantoprazole  Injectable 40 milliGRAM(s) IV Push two times a day  zinc sulfate 220 milliGRAM(s) Oral daily    MEDICATIONS  (PRN):  acetaminophen   Tablet .. 650 milliGRAM(s) Oral every 6 hours PRN Temp greater or equal to 38C (100.4F), Mild Pain (1 - 3)  ALBUTerol    90 MICROgram(s) HFA Inhaler 2 Puff(s) Inhalation every 6 hours PRN Shortness of Breath  aluminum hydroxide/magnesium hydroxide/simethicone Suspension 30 milliLiter(s) Oral every 8 hours PRN Dyspepsia  guaiFENesin   Syrup  (Sugar-Free) 100 milliGRAM(s) Oral every 6 hours PRN Cough  morphine  - Injectable 2 milliGRAM(s) IV Push every 6 hours PRN Moderate Pain (4 - 6)  morphine  - Injectable 4 milliGRAM(s) IV Push every 6 hours PRN Severe Pain (7 - 10)  ondansetron Injectable 4 milliGRAM(s) IV Push every 8 hours PRN Nausea                            14.1   5.65  )-----------( 282      ( 21 Dec 2020 06:50 )             41.7       12-21    140  |  105  |  9   ----------------------------<  108<H>  3.7   |  26  |  1.13    Ca    9.8      21 Dec 2020 06:50    TPro  7.1  /  Alb  3.4<L>  /  TBili  0.8  /  DBili  x   /  AST  38  /  ALT  89<H>  /  AlkPhos  78  12-21

## 2020-12-21 NOTE — PROGRESS NOTE ADULT - PROBLEM SELECTOR PLAN 2
CT abdomen + nonspecific colitis   continue Ceftriaxone and Flagyl   clear liquid diet, advance as tolerated   continue antiemetic and analgesics   Dr Perea ID follows

## 2020-12-21 NOTE — PROGRESS NOTE ADULT - SUBJECTIVE AND OBJECTIVE BOX
CHIEF COMPLAINT:Patient is a 30y old  Male who presents with a chief complaint of Covid infection.Pt still having abdominal pain and nausea.    	  REVIEW OF SYSTEMS:  CONSTITUTIONAL: No fever, weight loss, or fatigue  EYES: No eye pain, visual disturbances, or discharge  ENT:  No difficulty hearing, tinnitus, vertigo; No sinus or throat pain  NECK: No pain or stiffness  RESPIRATORY: No cough, wheezing, chills or hemoptysis; No Shortness of Breath  CARDIOVASCULAR: No chest pain, palpitations, passing out, dizziness, or leg swelling  GASTROINTESTINAL: + abdominal or epigastric pain. + nausea,Np  vomiting, or hematemesis; No diarrhea or constipation. No melena or hematochezia.  GENITOURINARY: No dysuria, frequency, hematuria, or incontinence  NEUROLOGICAL: No headaches, memory loss, loss of strength, numbness, or tremors  SKIN: No itching, burning, rashes, or lesions   LYMPH Nodes: No enlarged glands  ENDOCRINE: No heat or cold intolerance; No hair loss  MUSCULOSKELETAL: No joint pain or swelling; No muscle, back, or extremity pain  PSYCHIATRIC: No depression, anxiety, mood swings, or difficulty sleeping  HEME/LYMPH: No easy bruising, or bleeding gums  ALLERGY AND IMMUNOLOGIC: No hives or eczema	        PHYSICAL EXAM:  T(C): 36.3 (12-21-20 @ 05:39), Max: 36.6 (12-20-20 @ 14:17)  HR: 71 (12-21-20 @ 05:39) (64 - 82)  BP: 108/70 (12-21-20 @ 05:39) (108/70 - 137/88)  RR: 17 (12-21-20 @ 05:39) (16 - 18)  SpO2: 94% (12-21-20 @ 05:39) (94% - 100%)  Wt(kg): --  I&O's Summary      Appearance: Normal	  HEENT:   Normal oral mucosa, PERRL, EOMI	  Lymphatic: No lymphadenopathy  Cardiovascular: Normal S1 S2, No JVD, No murmurs, No edema  Respiratory: Lungs clear to auscultation	  Psychiatry: A & O x 3, Mood & affect appropriate  Gastrointestinal:  Soft, Non-tender, + BS	  Skin: No rashes, No ecchymoses, No cyanosis	  Neurologic: Non-focal  Extremities: Normal range of motion, No clubbing, cyanosis or edema  Vascular: Peripheral pulses palpable 2+ bilaterally    MEDICATIONS  (STANDING):  acetaminophen  IVPB .. 1000 milliGRAM(s) IV Intermittent once  ascorbic acid 1000 milliGRAM(s) Oral daily  cefTRIAXone   IVPB 1000 milliGRAM(s) IV Intermittent every 24 hours  cholecalciferol 1000 Unit(s) Oral daily  enoxaparin Injectable 40 milliGRAM(s) SubCutaneous daily  gemfibrozil 600 milliGRAM(s) Oral two times a day  metroNIDAZOLE  IVPB 500 milliGRAM(s) IV Intermittent every 8 hours  metroNIDAZOLE  IVPB      montelukast 10 milliGRAM(s) Oral every 24 hours  pantoprazole  Injectable 40 milliGRAM(s) IV Push two times a day  zinc sulfate 220 milliGRAM(s) Oral daily      	  	  LABS:	 	                        14.1   5.65  )-----------( 282      ( 21 Dec 2020 06:50 )             41.7     12-21    140  |  105  |  9   ----------------------------<  108<H>  3.7   |  26  |  1.13    Ca    9.8      21 Dec 2020 06:50    TPro  7.1  /  Alb  3.4<L>  /  TBili  0.8  /  DBili  x   /  AST  38  /  ALT  89<H>  /  AlkPhos  78  12-21    proBNP:   Lipid Profile: Cholesterol 174  LDL --  HDL 28      HgA1c:   TSH: Thyroid Stimulating Hormone, Serum: 1.64 uU/mL (12-18 @ 06:05)

## 2020-12-21 NOTE — DISCHARGE NOTE PROVIDER - HOSPITAL COURSE
29 y/o   male who  tested positive for covid  on 12/10/20 presented  with shortness of breath and blood in stool. Patient was seen in the ED on 12/17  and  discharged. Pt return to the ED with abdominal pain,   diarrhea and blood in stools.   On admission pt was positive for  COVID,  , Troponin is negative. EKG NSR   CT scan of abd showed nonspecific colitis.   CT chest showed nodular densities in both lungs and some are groundglass in appearance concerning for pneumonia/pneumonitis.   Started on Ceftriaxone and Flagyl. ID consulted for further assistance with antibiotic treatment        31 y/o   male who  tested positive for covid  on 12/10/20 presented  with shortness of breath and blood in stool. Patient was seen in the ED on 12/17  and  discharged. Pt return to the ED with abdominal pain,   diarrhea and blood in stools.   On admission pt was positive for  COVID,  , Troponin is negative. EKG NSR   CT scan of abd showed nonspecific colitis.   CT chest showed nodular densities in both lungs and some are groundglass in appearance concerning for pneumonia/pneumonitis.   Started on Ceftriaxone and Flagyl. ID consulted for further assistance with antibiotic treatment   GI consulted, continue ABT and PPI.        29 y/o   male who  tested positive for covid  on 12/10/20 presented  with shortness of breath and blood in stool. Patient was seen in the ED on 12/17  and  discharged. Pt returned to the ED with abdominal pain,   diarrhea and blood in stools.   On admission pt was positive for  COVID,  , Troponin negative. EKG NSR   CT scan of abd showed nonspecific colitis.   CT chest showed nodular densities in both lungs and some are groundglass in appearance concerning for pneumonia/pneumonitis.   Pt. followed by ID and GI, treated with Ceftriaxone, Flagyl and PPI with resolution of colitis.  Pt. is currently on RA with O2 sats 95-97%            31 y/o   male who  tested positive for covid  on 12/10/20 presented  with shortness of breath and blood in stool. Patient was seen in the ED on 12/17  and  discharged. Pt returned to the ED with abdominal pain,   diarrhea and blood in stools.   On admission pt was positive for  COVID,  Troponin negative. EKG NSR.  CT scan of abd showed nonspecific colitis.   CT chest showed nodular densities in both lungs and some are groundglass in appearance concerning for pneumonia/pneumonitis.   Pt. followed by ID and GI, treated with Ceftriaxone, Flagyl and PPI with resolution of colitis.  Pt. is currently on RA with O2 sats 95-97%, not in distress on ambulation.   Patient is medically optimized for discharge home. will need to continue steroid therapy upon discharge.       incomplete  This is a brief summary of hospitalization. Please refer to medical records for completed hospital course.   Discharge plan discussed with attending physician.            29 y/o   male who  tested positive for covid  on 12/10/20 presented  with shortness of breath and blood in stool. Patient was seen in the ED on 12/17  and  discharged. Pt returned to the ED with abdominal pain,   diarrhea and blood in stools.   Patient was positive for  COVID on admission,  Troponin negative. EKG NSR.  CT scan of abd showed nonspecific colitis.   CT chest showed nodular densities in both lungs and some are groundglass in appearance concerning for pneumonia/pneumonitis. Followed by pulmonary and cardiology. recommended outpatient Echocardiogram.  Pt. followed by ID and GI, treated with Ceftriaxone, Flagyl and PPI with resolution of colitis.  Pt. is currently on RA with O2 sats 99%, not in distress on ambulation.   Patient is medically optimized for discharge home. will need to continue steroid therapy with tapered dose and anticoagulant therapy.   This is a brief summary of hospitalization. Please refer to medical records for completed hospital course.   Discharge plan discussed with attending physician.

## 2020-12-21 NOTE — PROGRESS NOTE ADULT - ASSESSMENT
29 YO pt who works in a [employer], was tested positive for covid 19 on 12/10/20 is presenting with shortness of breath and blood in stool. Patient was seen in the ED on friday with same symptoms and was discharged ,He is coming back with hematemesis, diarrhea and blood in stools,chest pain and shortness of breath,COVID+ pneumonia.  1.Echocardiogram as outpatient.  2.Prediabetes-diet.  3.Lipid d/o-lopid 600mg bid.  4.Abdominal pain-GI eval noted.  5.COVID+-abx,zinc,singulair,ID f/u..  6.GI and DVT prophylaxis.

## 2020-12-21 NOTE — CONSULT NOTE ADULT - SUBJECTIVE AND OBJECTIVE BOX
Time of visit:    CHIEF COMPLAINT: Patient is a 30y old  Male who presents with a chief complaint of Covid infection (21 Dec 2020 12:32)      HPI:  29 YO pt who works in a [employer], was tested positive for covid 19 on 12/10/20 is presenting with shortness of breath and blood in stool. Patient was seen in the ED on friday with same symptoms and was discharged ,He is coming back with hematemesis, diarrhea and blood in stools. patient endorses severe chest pain that is parasternal localzid, increases with eating. Patient also endorses vomiting whenever he tries to eat and loss of appetite. Patient reports that he was saturating around 85% at home, however, in the hospital he is saturating 95% on RA and not in Respiratory distress.   Off note patient has history of constipation and was found to have polyp few years ago,     ED Course:  1 dose of  Duoneb and Toradol  Troponin negative x 2  CXR normal, no new changes   Vital Signs Last 24 Hrs  T(C): 37.1 (17 Dec 2020 15:29), Max: 37.1 (17 Dec 2020 15:29)  T(F): 98.8 (17 Dec 2020 15:29), Max: 98.8 (17 Dec 2020 15:29)  HR: 102 (17 Dec 2020 15:29) (77 - 102)  BP: 148/79 (17 Dec 2020 15:29) (132/86 - 148/79)  RR: 17 (17 Dec 2020 15:29) (16 - 17)  SpO2: 95% (17 Dec 2020 16:50) (95% - 97%) (17 Dec 2020 17:06)   Patient seen and examined.     PAST MEDICAL & SURGICAL HISTORY:  Colonic polyp    No pertinent past medical history        Allergies    No Known Allergies    Intolerances        MEDICATIONS  (STANDING):  ascorbic acid 1000 milliGRAM(s) Oral daily  cefTRIAXone   IVPB 1000 milliGRAM(s) IV Intermittent every 24 hours  cholecalciferol 1000 Unit(s) Oral daily  enoxaparin Injectable 40 milliGRAM(s) SubCutaneous daily  gemfibrozil 600 milliGRAM(s) Oral two times a day  metroNIDAZOLE  IVPB 500 milliGRAM(s) IV Intermittent every 8 hours  metroNIDAZOLE  IVPB      montelukast 10 milliGRAM(s) Oral every 24 hours  pantoprazole  Injectable 40 milliGRAM(s) IV Push two times a day  zinc sulfate 220 milliGRAM(s) Oral daily      MEDICATIONS  (PRN):  acetaminophen   Tablet .. 650 milliGRAM(s) Oral every 6 hours PRN Temp greater or equal to 38C (100.4F), Mild Pain (1 - 3)  ALBUTerol    90 MICROgram(s) HFA Inhaler 2 Puff(s) Inhalation every 6 hours PRN Shortness of Breath  aluminum hydroxide/magnesium hydroxide/simethicone Suspension 30 milliLiter(s) Oral every 8 hours PRN Dyspepsia  guaiFENesin   Syrup  (Sugar-Free) 100 milliGRAM(s) Oral every 6 hours PRN Cough  morphine  - Injectable 2 milliGRAM(s) IV Push every 6 hours PRN Moderate Pain (4 - 6)  morphine  - Injectable 4 milliGRAM(s) IV Push every 6 hours PRN Severe Pain (7 - 10)  ondansetron Injectable 4 milliGRAM(s) IV Push every 8 hours PRN Nausea   Medications up to date at time of exam.    Medications up to date at time of exam.    FAMILY HISTORY:      SOCIAL HISTORY  Smoking History: [   ] smoking/smoke exposure, [   ] former smoker  Living Condition: [   ] apartment, [   ] private house  Work History:   Travel History: denies recent travel  Illicit Substance Use: denies  Alcohol Use: denies    REVIEW OF SYSTEMS:    CONSTITUTIONAL:  denies fevers, chills, sweats, weight loss    HEENT:  denies diplopia or blurred vision, sore throat or runny nose.    CARDIOVASCULAR:  denies pressure, squeezing, tightness, or heaviness about the chest; no palpitations.    RESPIRATORY:  denies SOB, cough, CAMPBELL, wheezing.    GASTROINTESTINAL:  denies abdominal pain, nausea, vomiting or diarrhea.    GENITOURINARY: denies dysuria, frequency or urgency.    NEUROLOGIC:  denies numbness, tingling, seizures or weakness.    PSYCHIATRIC:  denies disorder of thought or mood.    MSK: denies swelling, redness      PHYSICAL EXAMINATION:    GENERAL: The patient is a well-developed, well-nourished, in no apparent distress.     Vital Signs Last 24 Hrs  T(C): 36.2 (21 Dec 2020 14:43), Max: 36.3 (20 Dec 2020 20:52)  T(F): 97.2 (21 Dec 2020 14:43), Max: 97.4 (21 Dec 2020 05:39)  HR: 85 (21 Dec 2020 14:43) (71 - 85)  BP: 118/90 (21 Dec 2020 14:43) (108/70 - 137/88)  BP(mean): --  RR: 18 (21 Dec 2020 14:43) (16 - 18)  SpO2: 98% (21 Dec 2020 14:43) (94% - 100%)   (if applicable)    Chest Tube (if applicable)    HEENT: Head is normocephalic and atraumatic. Extraocular muscles are intact. Mucous membranes are moist.     NECK: Supple, no palpable adenopathy.    LUNGS: Clear to auscultation, no wheezing, rales, or rhonchi.    HEART: Regular rate and rhythm without murmur.    ABDOMEN: Soft, nontender, and nondistended.  No hepatosplenomegaly is noted.    RENAL: No difficulty voiding, no pelvic pain    EXTREMITIES: Without any cyanosis, clubbing, rash, lesions or edema.    NEUROLOGIC: Awake, alert, oriented, grossly intact    SKIN: Warm, dry, good turgor.      LABS:                        14.1   5.65  )-----------( 282      ( 21 Dec 2020 06:50 )             41.7     12-    140  |  105  |  9   ----------------------------<  108<H>  3.7   |  26  |  1.13    Ca    9.8      21 Dec 2020 06:50    TPro  7.1  /  Alb  3.4<L>  /  TBili  0.8  /  DBili  x   /  AST  38  /  ALT  89<H>  /  AlkPhos  78  12-21      Urinalysis Basic - ( 19 Dec 2020 16:22 )    Color: Yellow / Appearance: Clear / S.010 / pH: x  Gluc: x / Ketone: Negative  / Bili: Negative / Urobili: Negative   Blood: x / Protein: 15 / Nitrite: Negative   Leuk Esterase: Negative / RBC: 0-2 /HPF / WBC 3-5 /HPF   Sq Epi: x / Non Sq Epi: Few /HPF / Bacteria: Moderate /HPF                      MICROBIOLOGY: (if applicable)    RADIOLOGY & ADDITIONAL STUDIES:  EKG:   CXR:  ECHO:    IMPRESSION: 30y Male PAST MEDICAL & SURGICAL HISTORY:  Colonic polyp    No pertinent past medical history     p/w                   RECOMMENDATIONS:   Time of visit:    CHIEF COMPLAINT: Patient is a 30y old  Male who presents with a chief complaint of Covid infection (21 Dec 2020 12:32)      HPI:  31 YO pt who works in a [employer], was tested positive for covid 19 on 12/10/20 is presenting with shortness of breath and blood in stool. Patient was seen in the ED on friday with same symptoms and was discharged ,He is coming back with hematemesis, diarrhea and blood in stools. patient endorses severe chest pain that is parasternal localzid, increases with eating. Patient also endorses vomiting whenever he tries to eat and loss of appetite. Patient reports that he was saturating around 85% at home, however, in the hospital he is saturating 95% on RA and not in Respiratory distress.   Off note patient has history of constipation and was found to have polyp few years ago,     ED Course:  1 dose of  Duoneb and Toradol  Troponin negative x 2  CXR normal, no new changes   Vital Signs Last 24 Hrs  T(C): 37.1 (17 Dec 2020 15:29), Max: 37.1 (17 Dec 2020 15:29)  T(F): 98.8 (17 Dec 2020 15:29), Max: 98.8 (17 Dec 2020 15:29)  HR: 102 (17 Dec 2020 15:29) (77 - 102)  BP: 148/79 (17 Dec 2020 15:29) (132/86 - 148/79)  RR: 17 (17 Dec 2020 15:29) (16 - 17)  SpO2: 95% (17 Dec 2020 16:50) (95% - 97%) (17 Dec 2020 17:06)   Patient seen and examined.     PAST MEDICAL & SURGICAL HISTORY:  Colonic polyp    No pertinent past medical history        Allergies    No Known Allergies    Intolerances        MEDICATIONS  (STANDING):  ascorbic acid 1000 milliGRAM(s) Oral daily  cefTRIAXone   IVPB 1000 milliGRAM(s) IV Intermittent every 24 hours  cholecalciferol 1000 Unit(s) Oral daily  enoxaparin Injectable 40 milliGRAM(s) SubCutaneous daily  gemfibrozil 600 milliGRAM(s) Oral two times a day  metroNIDAZOLE  IVPB 500 milliGRAM(s) IV Intermittent every 8 hours  metroNIDAZOLE  IVPB      montelukast 10 milliGRAM(s) Oral every 24 hours  pantoprazole  Injectable 40 milliGRAM(s) IV Push two times a day  zinc sulfate 220 milliGRAM(s) Oral daily      MEDICATIONS  (PRN):  acetaminophen   Tablet .. 650 milliGRAM(s) Oral every 6 hours PRN Temp greater or equal to 38C (100.4F), Mild Pain (1 - 3)  ALBUTerol    90 MICROgram(s) HFA Inhaler 2 Puff(s) Inhalation every 6 hours PRN Shortness of Breath  aluminum hydroxide/magnesium hydroxide/simethicone Suspension 30 milliLiter(s) Oral every 8 hours PRN Dyspepsia  guaiFENesin   Syrup  (Sugar-Free) 100 milliGRAM(s) Oral every 6 hours PRN Cough  morphine  - Injectable 2 milliGRAM(s) IV Push every 6 hours PRN Moderate Pain (4 - 6)  morphine  - Injectable 4 milliGRAM(s) IV Push every 6 hours PRN Severe Pain (7 - 10)  ondansetron Injectable 4 milliGRAM(s) IV Push every 8 hours PRN Nausea   Medications up to date at time of exam.    Medications up to date at time of exam.    FAMILY HISTORY:      SOCIAL HISTORY  Smoking History: [   ] smoking/smoke exposure, [   ] former smoker  Living Condition: [   ] apartment, [   ] private house  Work History:   Travel History: denies recent travel  Illicit Substance Use: denies  Alcohol Use: denies    REVIEW OF SYSTEMS:    CONSTITUTIONAL:  denies fevers, chills, sweats, weight loss    HEENT:  denies diplopia or blurred vision, sore throat or runny nose.    CARDIOVASCULAR:  denies pressure, squeezing, tightness, or heaviness about the chest; no palpitations.    RESPIRATORY:  denies SOB, cough, CAMPBELL, wheezing.    GASTROINTESTINAL:  denies abdominal pain, nausea, vomiting or diarrhea.    GENITOURINARY: denies dysuria, frequency or urgency.    NEUROLOGIC:  denies numbness, tingling, seizures or weakness.    PSYCHIATRIC:  denies disorder of thought or mood.    MSK: denies swelling, redness      PHYSICAL EXAMINATION:    GENERAL: The patient is a well-developed, well-nourished, in no apparent distress.     Vital Signs Last 24 Hrs  T(C): 36.2 (21 Dec 2020 14:43), Max: 36.3 (20 Dec 2020 20:52)  T(F): 97.2 (21 Dec 2020 14:43), Max: 97.4 (21 Dec 2020 05:39)  HR: 85 (21 Dec 2020 14:43) (71 - 85)  BP: 118/90 (21 Dec 2020 14:43) (108/70 - 137/88)  BP(mean): --  RR: 18 (21 Dec 2020 14:43) (16 - 18)  SpO2: 98% (21 Dec 2020 14:43) (94% - 100%)   (if applicable)    Chest Tube (if applicable)    HEENT: Head is normocephalic and atraumatic. Extraocular muscles are intact. Mucous membranes are moist.     NECK: Supple, no palpable adenopathy.    LUNGS: Clear to auscultation, no wheezing, rales, or rhonchi.    HEART: Regular rate and rhythm without murmur.    ABDOMEN: Soft, nontender, and nondistended.  No hepatosplenomegaly is noted.    RENAL: No difficulty voiding, no pelvic pain    EXTREMITIES: Without any cyanosis, clubbing, rash, lesions or edema.    NEUROLOGIC: Awake, alert, oriented, grossly intact    SKIN: Warm, dry, good turgor.      LABS:                        14.1   5.65  )-----------( 282      ( 21 Dec 2020 06:50 )             41.7     12    140  |  105  |  9   ----------------------------<  108<H>  3.7   |  26  |  1.13    Ca    9.8      21 Dec 2020 06:50    TPro  7.1  /  Alb  3.4<L>  /  TBili  0.8  /  DBili  x   /  AST  38  /  ALT  89<H>  /  AlkPhos  78  12-21      Urinalysis Basic - ( 19 Dec 2020 16:22 )    Color: Yellow / Appearance: Clear / S.010 / pH: x  Gluc: x / Ketone: Negative  / Bili: Negative / Urobili: Negative   Blood: x / Protein: 15 / Nitrite: Negative   Leuk Esterase: Negative / RBC: 0-2 /HPF / WBC 3-5 /HPF   Sq Epi: x / Non Sq Epi: Few /HPF / Bacteria: Moderate /HPF                      MICROBIOLOGY: (if applicable)    RADIOLOGY & ADDITIONAL STUDIES:  EKG:   CT chest:< from: CT Chest w/ Oral Cont and w/ IV Cont (20 @ 19:08) >    EXAM:  CT ABDOMEN AND PELVIS OC IC                          EXAM:  CT CHEST OC IC                            PROCEDURE DATE:  2020          INTERPRETATION:  CT of the chest, abdomen, and pelvis with IV and oral contrast    INDICATION: Chest pain. Hematemesis. Hematochezia.    TECHNIQUE: Axial multidetector CT images of the chest, abdomen, and pelvis are acquired following the administration of oral contrast and IV contrast (90 cc of Omnipaque-350 administered, 10 cc discarded)    COMPARISON: Abdominal/pelvic CT dated 10/12/2011.    FINDINGS:    CHEST: No pleural or pericardial effusion. The heart is not enlarged. No evidence for aortic dissection, or aneurysm. No enlarged mediastinal, hilar, or axillary lymph node. The trachea and central bronchi are patent.    No evidence of pneumothorax. Nodular densities in both lungs and some are groundglass in appearance. Findings may represent pneumonia/pneumonitis.  Clinical correlation is recommended.    Abdomen/pelvis: New hepatic steatosis. The gallbladder, common bile duct, pancreas, adrenals and kidneys appear unremarkable. No evidence for a ureteral calculus. No hydronephrosis.    Splenomegaly measuring 16.0 cm.    The appendix appears unremarkable. No bowel obstruction. Apparent segmental mural thickening of the ascending colon may be due to underdistention or nonspecific colitis.    No evidence of free air, ascites, or enlarged lymph node.    Distended urinary bladder which is otherwise unremarkable. The prostate appears grossly unremarkable.    IMPRESSION:    Nodular densities in both lungs and some are groundglass in appearance. Findings may represent pneumonia/pneumonitis.  Clinical correlation is recommended. Follow-up chest CT may be pursued in 4-6 weeks to ensure resolution.    New hepatic steatosis.    Splenomegaly.    Apparent segmental mural thickening of the ascending colon may be due to underdistention or nonspecific colitis. Clinical correlation is recommended.              TRISH ALVARES MD; Attending Radiologist  This document has been electronically signed. Dec 18 2020  7:40PM    < end of copied text >      CT abd/pelvis:< from: CT Abdomen and Pelvis w/ Oral Cont and w/ IV Cont (20 @ 19:09) >    EXAM:  CT ABDOMEN AND PELVIS OC IC                          EXAM:  CT CHEST OC IC                            PROCEDURE DATE:  2020          INTERPRETATION:  CT of the chest, abdomen, and pelvis with IV and oral contrast    INDICATION: Chest pain. Hematemesis. Hematochezia.    TECHNIQUE: Axial multidetector CT images of the chest, abdomen, and pelvis are acquired following the administration of oral contrast and IV contrast (90 cc of Omnipaque-350 administered, 10 cc discarded)    COMPARISON: Abdominal/pelvic CT dated 10/12/2011.    FINDINGS:    CHEST: No pleural or pericardial effusion. The heart is not enlarged. No evidence for aortic dissection, or aneurysm. No enlarged mediastinal, hilar, or axillary lymph node. The trachea and central bronchi are patent.    No evidence of pneumothorax. Nodular densities in both lungs and some are groundglass in appearance. Findings may represent pneumonia/pneumonitis.  Clinical correlation is recommended.    Abdomen/pelvis: New hepatic steatosis. The gallbladder, common bile duct, pancreas, adrenals and kidneys appear unremarkable. No evidence for a ureteral calculus. No hydronephrosis.    Splenomegaly measuring 16.0 cm.    The appendix appears unremarkable. No bowel obstruction. Apparent segmental mural thickening of the ascending colon may be due to underdistention or nonspecific colitis.    No evidence of free air, ascites, or enlarged lymph node.    Distended urinary bladder which is otherwise unremarkable. The prostate appears grossly unremarkable.    IMPRESSION:    Nodular densities in both lungs and some are groundglass in appearance. Findings may represent pneumonia/pneumonitis.  Clinical correlation is recommended. Follow-up chest CT may be pursued in 4-6 weeks to ensure resolution.    New hepatic steatosis.    Splenomegaly.    Apparent segmental mural thickening of the ascending colon may be due to underdistention or nonspecific colitis. Clinical correlation is recommended.              TRISH ALVARES MD; Attending Radiologist  This document has been electronically signed. Dec 18 2020  7:40PM    < end of copied text >    ECHO:    IMPRESSION: 30y Male PAST MEDICAL & SURGICAL HISTORY:  Colonic polyp    No pertinent past medical history     p/w         IMP: This is a 30 yrs old man who works in a [employer], was tested positive for covid 19 on 12/10/20 is presenting with shortness of breath and blood in stool.  Pat is tolerating room air .  Low biomarkers    - COVID-19 pna  -colitis   - Pre-DM  -Obesity        Plan  -continue isolation   -monitor oximetry  -continue antibx  -no need for decadron or Remdesivir   -DVT/GI prophy  -out pat pulmonary f/u

## 2020-12-21 NOTE — DISCHARGE NOTE PROVIDER - NSDCCPCAREPLAN_GEN_ALL_CORE_FT
PRINCIPAL DISCHARGE DIAGNOSIS  Diagnosis: COVID-19  Assessment and Plan of Treatment: You tested positive for COVID 19 infection 12/10/ and 12/17, recommended you to be self-quarantine for 2 weeks from 12/10 once you discharge home   CORONAVIRUS INSTRUCTIONS:   Based on your current clinical status and stability, it has been determined that you no longer need hospitalization and can recover while remaining in self-quarantine at home. You should follow the prevention steps below until a healthcare provider or local or state health department says you can return to your normal activities.   1. You should restrict activities outside your home, except for getting medical care.   2. Do not go to work, school, or public areas.   3. Avoid using public transportation, ride-sharing, or taxis.   4. Separate yourself from other people and animals in your home as much as possible.  When you are around other people (e.g., sharing a room or vehicle) you should wear a facemask.  5. Wash your hands often with soap and water for at least 20 seconds, especially after blowing your nose, coughing, or sneezing; going to the bathroom; and before eating or preparing food.  6. Cover your mouth and nose with a tissue when you cough or sneeze. Throw used tissues in a lined trash can. Immediately wash your hands with soap and water for at least 20 seconds  7. High touch surfaces include counters, tabletops, doorknobs, bathroom fixtures, toilets, phones, keyboards, tablets, and bedside tables.  8. Avoid sharing dishes, drinking glasses, cups, eating utensils, towels, or bedding with other people or pets in your home. After using these items, they should be washed thoroughly with soap and water.  You are strongly advised to seek prompt medical attention if your illness worsens or you develop new symptoms like fever or difficulty breathing.      SECONDARY DISCHARGE DIAGNOSES  Diagnosis: Colitis  Assessment and Plan of Treatment: HOME CARE INSTRUCTIONS  Get plenty of rest.  Drink enough water and fluids to keep your urine clear or pale yellow.  Eat a well-balanced diet.  Call your caregiver for follow-up as recommended.  SEEK IMMEDIATE MEDICAL CARE IF:  You develop chills.  You have an oral temperature above 100.3F, not controlled by medicine.  You have extreme weakness, fainting, or dehydration.  You have repeated vomiting.  You develop severe belly (abdominal) pain or are passing bloody or tarry stools      Diagnosis: Chest pain  Assessment and Plan of Treatment:     Diagnosis: Blood in stool  Assessment and Plan of Treatment:     Diagnosis: Hemoptysis  Assessment and Plan of Treatment:     Diagnosis: SOB (shortness of breath) on exertion  Assessment and Plan of Treatment:      PRINCIPAL DISCHARGE DIAGNOSIS  Diagnosis: COVID-19  Assessment and Plan of Treatment: You tested positive for COVID 19 infection 12/10/ and 12/17, recommended you to be self-quarantine for 2 weeks from 12/10 once you discharge home   CORONAVIRUS INSTRUCTIONS:   Based on your current clinical status and stability, it has been determined that you no longer need hospitalization and can recover while remaining in self-quarantine at home. You should follow the prevention steps below until a healthcare provider or local or state health department says you can return to your normal activities.   1. You should restrict activities outside your home, except for getting medical care.   2. Do not go to work, school, or public areas.   3. Avoid using public transportation, ride-sharing, or taxis.   4. Separate yourself from other people and animals in your home as much as possible.  When you are around other people (e.g., sharing a room or vehicle) you should wear a facemask.  5. Wash your hands often with soap and water for at least 20 seconds, especially after blowing your nose, coughing, or sneezing; going to the bathroom; and before eating or preparing food.  6. Cover your mouth and nose with a tissue when you cough or sneeze. Throw used tissues in a lined trash can. Immediately wash your hands with soap and water for at least 20 seconds  7. High touch surfaces include counters, tabletops, doorknobs, bathroom fixtures, toilets, phones, keyboards, tablets, and bedside tables.  8. Avoid sharing dishes, drinking glasses, cups, eating utensils, towels, or bedding with other people or pets in your home. After using these items, they should be washed thoroughly with soap and water.  You are strongly advised to seek prompt medical attention if your illness worsens or you develop new symptoms like fever or difficulty breathing.      SECONDARY DISCHARGE DIAGNOSES  Diagnosis: Elevated liver enzymes  Assessment and Plan of Treatment: Your liver enzymes were elevated during this admission likely due to COVID 19 infection. Daily liver enzymes are improving.   Try to avoid alcohol consumption after discharge.   Follow up with your primary MD to repeat blood test.    Diagnosis: Colitis  Assessment and Plan of Treatment: You were admitted for Colitis likely due to COVId 19 infection. Followed by gastroenterologist and treated with antibiotics.   continue regular diet. Try to avoid food to irritate your stomach.   HOME CARE INSTRUCTIONS  Get plenty of rest.  Drink enough water and fluids to keep your urine clear or pale yellow.  Eat a well-balanced diet.  Call your caregiver for follow-up as recommended.  SEEK IMMEDIATE MEDICAL CARE IF:  You develop chills.  You have an oral temperature above 100.3F, not controlled by medicine.  You have extreme weakness, fainting, or dehydration.  You have repeated vomiting.  You develop severe belly (abdominal) pain or are passing bloody or tarry stools      Diagnosis: Chest pain  Assessment and Plan of Treatment: you complained of chest discomfort upon admission, likely due to COVID 19 infection.  EKG shows normal sinus rhythm. Cardiac enzymes are normal. Symptoms are resolved.   Follow up with your primary MD after discharge.    Diagnosis: Blood in stool  Assessment and Plan of Treatment: You were followed for blood in stool and evaluated by gastroenterologist. Resolved this time.   Follow up with your primary MD after discharge.   Report if you continue experiencing blood in stool.    Diagnosis: Hemoptysis  Assessment and Plan of Treatment: You were followed for hemoptysis and evaluated by gastroenterologist. Resolved. continue medications as prescribed.   Follow up with your primary MD after discharge.        PRINCIPAL DISCHARGE DIAGNOSIS  Diagnosis: COVID-19  Assessment and Plan of Treatment: You tested positive for COVID 19 infection 12/10/ and 12/17, recommended you to be self-quarantine for 2 weeks from 12/10 once you discharge home   continue taking anticoagulant therapy and steroid as recommended.   CORONAVIRUS INSTRUCTIONS:   Based on your current clinical status and stability, it has been determined that you no longer need hospitalization and can recover while remaining in self-quarantine at home. You should follow the prevention steps below until a healthcare provider or local or state health department says you can return to your normal activities.   1. You should restrict activities outside your home, except for getting medical care.   2. Do not go to work, school, or public areas.   3. Avoid using public transportation, ride-sharing, or taxis.   4. Separate yourself from other people and animals in your home as much as possible.  When you are around other people (e.g., sharing a room or vehicle) you should wear a facemask.  5. Wash your hands often with soap and water for at least 20 seconds, especially after blowing your nose, coughing, or sneezing; going to the bathroom; and before eating or preparing food.  6. Cover your mouth and nose with a tissue when you cough or sneeze. Throw used tissues in a lined trash can. Immediately wash your hands with soap and water for at least 20 seconds  7. High touch surfaces include counters, tabletops, doorknobs, bathroom fixtures, toilets, phones, keyboards, tablets, and bedside tables.  8. Avoid sharing dishes, drinking glasses, cups, eating utensils, towels, or bedding with other people or pets in your home. After using these items, they should be washed thoroughly with soap and water.  You are strongly advised to seek prompt medical attention if your illness worsens or you develop new symptoms like fever or difficulty breathing.      SECONDARY DISCHARGE DIAGNOSES  Diagnosis: Elevated liver enzymes  Assessment and Plan of Treatment: Your liver enzymes were elevated during this admission likely due to COVID 19 infection. Daily liver enzymes are improving.   Try to avoid alcohol consumption after discharge.   Follow up with your primary MD to repeat blood test.    Diagnosis: Colitis  Assessment and Plan of Treatment: You were admitted for Colitis likely due to COVId 19 infection. Followed by gastroenterologist and treated with antibiotics.   continue regular diet. Try to avoid food to irritate your stomach.   HOME CARE INSTRUCTIONS  Get plenty of rest.  Drink enough water and fluids to keep your urine clear or pale yellow.  Eat a well-balanced diet.  Call your caregiver for follow-up as recommended.  SEEK IMMEDIATE MEDICAL CARE IF:  You develop chills.  You have an oral temperature above 100.3F, not controlled by medicine.  You have extreme weakness, fainting, or dehydration.  You have repeated vomiting.  You develop severe belly (abdominal) pain or are passing bloody or tarry stools      Diagnosis: Chest pain  Assessment and Plan of Treatment: you complained of chest discomfort upon admission, likely due to COVID 19 infection.  EKG shows normal sinus rhythm. Cardiac enzymes are normal. Symptoms are resolved.   Follow up with your primary MD and cardiology for follow up Echocardiogram after discharge.       Diagnosis: Blood in stool  Assessment and Plan of Treatment: You were followed for blood in stool and evaluated by gastroenterologist. Resolved this time.   Follow up with your primary MD after discharge.   Report if you continue experiencing blood in stool.    Diagnosis: Hemoptysis  Assessment and Plan of Treatment: You were followed for hemoptysis and evaluated by gastroenterologist. Resolved. continue medications as prescribed.   Follow up with your primary MD after discharge.        PRINCIPAL DISCHARGE DIAGNOSIS  Diagnosis: COVID-19  Assessment and Plan of Treatment: You tested positive for COVID 19 infection 12/10/ and 12/17,   recommended you to be self-quarantine for 2 weeks from 12/10 once you discharge home   CT chest resulted pneumonia/pneumonitis. May repeat chest CT in 3-6 months.   continue taking anticoagulant therapy and steroid as recommended.   CORONAVIRUS INSTRUCTIONS:   Based on your current clinical status and stability, it has been determined that you no longer need hospitalization and can recover while remaining in self-quarantine at home. You should follow the prevention steps below until a healthcare provider or local or state health department says you can return to your normal activities.   1. You should restrict activities outside your home, except for getting medical care.   2. Do not go to work, school, or public areas.   3. Avoid using public transportation, ride-sharing, or taxis.   4. Separate yourself from other people and animals in your home as much as possible.  When you are around other people (e.g., sharing a room or vehicle) you should wear a facemask.  5. Wash your hands often with soap and water for at least 20 seconds, especially after blowing your nose, coughing, or sneezing; going to the bathroom; and before eating or preparing food.  6. Cover your mouth and nose with a tissue when you cough or sneeze. Throw used tissues in a lined trash can. Immediately wash your hands with soap and water for at least 20 seconds  7. High touch surfaces include counters, tabletops, doorknobs, bathroom fixtures, toilets, phones, keyboards, tablets, and bedside tables.  8. Avoid sharing dishes, drinking glasses, cups, eating utensils, towels, or bedding with other people or pets in your home. After using these items, they should be washed thoroughly with soap and water.  You are strongly advised to seek prompt medical attention if your illness worsens or you develop new symptoms like fever or difficulty breathing.      SECONDARY DISCHARGE DIAGNOSES  Diagnosis: Colitis  Assessment and Plan of Treatment: You were admitted for Colitis likely due to COVId 19 infection. Followed by gastroenterologist and treated with antibiotics.   continue regular diet. Try to avoid food to irritate your stomach.   HOME CARE INSTRUCTIONS  Get plenty of rest.  Drink enough water and fluids to keep your urine clear or pale yellow.  Eat a well-balanced diet.  Call your caregiver for follow-up as recommended.  SEEK IMMEDIATE MEDICAL CARE IF:  You develop chills.  You have an oral temperature above 100.3F, not controlled by medicine.  You have extreme weakness, fainting, or dehydration.  You have repeated vomiting.  You develop severe belly (abdominal) pain or are passing bloody or tarry stools      Diagnosis: Vitamin D deficiency  Assessment and Plan of Treatment: Your blood Vitamin D level was low during this admission. continue taking vitamin D supplement. Increase Harjinder/Vitamin in your diet. Follow up with your primary MD to monitor blood level.    Diagnosis: Hypercholesterolemia  Assessment and Plan of Treatment: Your cholesterol level was elevated on admission. Started cholesterol pill. Continue taking medication as prescribed. Follow up with your primary MD after discharge.    Diagnosis: Hepatic steatosis  Assessment and Plan of Treatment: You were found to have hepatic steatosis, which is fatty liver and splenomegaly (enlarged spleen) on abdominal CT result. You were followed by gastroenterologist.   Try to avoid fatty food in your diet.   Follow up with primary MD. May repeat CT abdomen in 3-6 months.    Diagnosis: Elevated liver enzymes  Assessment and Plan of Treatment: Your liver enzymes were elevated during this admission likely due to COVID 19 infection. Daily liver enzymes are improving.   Try to avoid alcohol consumption after discharge.   Follow up with your primary MD to repeat blood test.    Diagnosis: Chest pain  Assessment and Plan of Treatment: you complained of chest discomfort upon admission, likely due to COVID 19 infection.  EKG shows normal sinus rhythm. Cardiac enzymes are normal. Symptoms are resolved.   Follow up with your primary MD and cardiology for follow up Echocardiogram after discharge.       Diagnosis: Blood in stool  Assessment and Plan of Treatment: You were followed for blood in stool and evaluated by gastroenterologist. Resolved this time.   Follow up with your primary MD after discharge.   Report if you continue experiencing blood in stool.    Diagnosis: Hemoptysis  Assessment and Plan of Treatment: You were followed for hemoptysis and evaluated by gastroenterologist. Resolved. continue medications as prescribed.   Follow up with your primary MD after discharge.

## 2020-12-21 NOTE — PROGRESS NOTE ADULT - ASSESSMENT
Patient is a 30y old  Male who works in a [employer], was tested positive for covid 19 on 12/10/20 and now presenting with shortness of breath and blood in stool. Patient was seen in the ED on Friday with same symptoms and was discharged ,He is coming back with hematemesis, diarrhea and blood in stools. Patient endorses severe chest pain that is parasternal localized , increases with eating. Patient also endorses vomiting whenever he tries to eat and loss of appetite. Patient reports that he was saturating around 85% at home, however, in the hospital he is saturating 95% on RA and not in Respiratory distress.  Off note patient has history of constipation and was found to have polyp few years ago, On admission, he found to have no fever, no leukocytosis but tachycardia and positive COVID test. The CXR and Troponin is negative. The ID consult requested to assist with further management.     # COVID infection- currently saturating well at Room air, No indication of Dexamethasone OR Remdesivir   # Nonspecific Colitis OR underdistention  ON CT abd/pelvis shows   Apparent segmental mural thickening of the ascending colon may be due to underdistention or nonspecific colitis.     would recommend:    1.Continue Supportive care, antiemetic agents   2.  Continue Ceftriaxone and Flagyl   3. Monitor oxygen saturation closely and start on IV dexamethasone 6 mg daily and Remdesivir if saturation fall to 93 % or less  4. Continue Bronchodilator as needed  5. COVID precautions     d/w  Dr. Mclean and Nursing staff     Attending Attestation:    Spent more than 45 minutes on total encounter, more than 50 % of the visit was spent counseling and/or coordinating care by the Attending physician. Patient is a 30y old  Male who works in a [employer], was tested positive for covid 19 on 12/10/20 and now presenting with shortness of breath and blood in stool. Patient was seen in the ED on Friday with same symptoms and was discharged ,He is coming back with hematemesis, diarrhea and blood in stools. Patient endorses severe chest pain that is parasternal localized , increases with eating. Patient also endorses vomiting whenever he tries to eat and loss of appetite. Patient reports that he was saturating around 85% at home, however, in the hospital he is saturating 95% on RA and not in Respiratory distress.  Off note patient has history of constipation and was found to have polyp few years ago, On admission, he found to have no fever, no leukocytosis but tachycardia and positive COVID test. The CXR and Troponin is negative. The ID consult requested to assist with further management.     # COVID infection- currently saturating well at Room air, No indication of Dexamethasone OR Remdesivir   # Nonspecific Colitis OR underdistention  ON CT abd/pelvis shows   Apparent segmental mural thickening of the ascending colon may be due to underdistention or nonspecific colitis.     would recommend:    1.  Continue Ceftriaxone and Flagyl X total of 7 days   2. Continue Supportive care, antiemetic agents    3. Monitor oxygen saturation closely and start on IV dexamethasone 6 mg daily and Remdesivir if saturation fall to 93 % or less  4. Continue Bronchodilator as needed  5. COVID precautions     d/w  Dr. Mclean and Nursing staff     Attending Attestation:    Spent more than 45 minutes on total encounter, more than 50 % of the visit was spent counseling and/or coordinating care by the Attending physician.

## 2020-12-21 NOTE — PROGRESS NOTE ADULT - SUBJECTIVE AND OBJECTIVE BOX
Patient is seen and examined at the bed side, is afebrile. The nausea has not resolved. He remains OFF oxygen.       REVIEW OF SYSTEMS: All other review systems are negative      ALLERGIES: No Known Allergies      PHYSICAL EXAMINATION:     Vital Signs Last 24 Hrs  T(C): 36.2 (21 Dec 2020 14:43), Max: 36.3 (20 Dec 2020 20:52)  T(F): 97.2 (21 Dec 2020 14:43), Max: 97.4 (21 Dec 2020 05:39)  HR: 85 (21 Dec 2020 14:43) (71 - 85)  BP: 118/90 (21 Dec 2020 14:43) (108/70 - 137/88)  BP(mean): --  RR: 18 (21 Dec 2020 14:43) (16 - 18)  SpO2: 98% (21 Dec 2020 14:43) (94% - 100%)      LABS:                                      14.1   5.65  )-----------( 282      ( 21 Dec 2020 06:50 )             41.7                      15.1   5.10  )-----------( 313      ( 20 Dec 2020 07:10 )             45.3       12-21    140  |  105  |  9   ----------------------------<  108<H>  3.7   |  26  |  1.13    Ca    9.8      21 Dec 2020 06:50    TPro  7.1  /  Alb  3.4<L>  /  TBili  0.8  /  DBili  x   /  AST  38  /  ALT  89<H>  /  AlkPhos  78  12-21 12-20    139  |  106  |  10  ----------------------------<  110<H>  4.0   |  27  |  1.15    Ca    10.0      20 Dec 2020 07:10    TPro  7.9  /  Alb  3.6  /  TBili  0.9  /  DBili  x   /  AST  49<H>  /  ALT  103<H>  /  AlkPhos  84  12-20          MEDICATIONS  (STANDING):    ascorbic acid 1000 milliGRAM(s) Oral daily  cefTRIAXone   IVPB 1000 milliGRAM(s) IV Intermittent every 24 hours  cholecalciferol 1000 Unit(s) Oral daily  enoxaparin Injectable 40 milliGRAM(s) SubCutaneous daily  gemfibrozil 600 milliGRAM(s) Oral two times a day  metroNIDAZOLE  IVPB 500 milliGRAM(s) IV Intermittent every 8 hours  metroNIDAZOLE  IVPB      montelukast 10 milliGRAM(s) Oral every 24 hours  pantoprazole  Injectable 40 milliGRAM(s) IV Push two times a day  zinc sulfate 220 milliGRAM(s) Oral daily      RADIOLOGY & ADDITIONAL TESTS:    12/18/20: CT Abd/pelvis and Chest w/ Oral Cont and w/ IV Cont (12.18.20 @ 19:08) Nodular densities in both lungs and some are ground glass in appearance. Findings may represent pneumonia/pneumonitis.  Clinical correlation is recommended. Follow-up chest CT may be pursued in 4-6 weeks to ensure resolution.  New hepatic steatosis.   Splenomegaly.   Apparent segmental mural thickening of the ascending colon may be due to underdistention or nonspecific colitis. Clinical correlation is recommended.      12/17/20 : Xray Chest 1 View- PORTABLE-Urgent (12.17.20 @ 14:41) The lung fields and pleural  surfaces are unremarkable.        MICROBIOLOGY DATA:    C-Reactive Protein, Serum (12.18.20 @ 09:14)   C-Reactive Protein, Serum: 0.33 mg/dL Ferritin, Serum (12.18.20 @ 09:12)   Ferritin, Serum: 783 ng/mL     D-Dimer Assay, Quantitative (12.17.20 @ 14:29)  <150 ng/mL DDU   Respiratory Viral Panel with COVID-19 by JERRY (12.17.20 @ 15:11)   Rapid RVP Result: Detected   SARS-CoV-2: Detected:                      Patient is seen and examined at the bed side, is afebrile. He is tolerating Abx well.      REVIEW OF SYSTEMS: All other review systems are negative      ALLERGIES: No Known Allergies      PHYSICAL EXAMINATION:     Vital Signs Last 24 Hrs  T(C): 36.2 (21 Dec 2020 14:43), Max: 36.3 (20 Dec 2020 20:52)  T(F): 97.2 (21 Dec 2020 14:43), Max: 97.4 (21 Dec 2020 05:39)  HR: 85 (21 Dec 2020 14:43) (71 - 85)  BP: 118/90 (21 Dec 2020 14:43) (108/70 - 137/88)  BP(mean): --  RR: 18 (21 Dec 2020 14:43) (16 - 18)  SpO2: 98% (21 Dec 2020 14:43) (94% - 100%)      LABS:                                      14.1   5.65  )-----------( 282      ( 21 Dec 2020 06:50 )             41.7                      15.1   5.10  )-----------( 313      ( 20 Dec 2020 07:10 )             45.3       12-21    140  |  105  |  9   ----------------------------<  108<H>  3.7   |  26  |  1.13    Ca    9.8      21 Dec 2020 06:50    TPro  7.1  /  Alb  3.4<L>  /  TBili  0.8  /  DBili  x   /  AST  38  /  ALT  89<H>  /  AlkPhos  78  12-21 12-20    139  |  106  |  10  ----------------------------<  110<H>  4.0   |  27  |  1.15    Ca    10.0      20 Dec 2020 07:10    TPro  7.9  /  Alb  3.6  /  TBili  0.9  /  DBili  x   /  AST  49<H>  /  ALT  103<H>  /  AlkPhos  84  12-20          MEDICATIONS  (STANDING):    ascorbic acid 1000 milliGRAM(s) Oral daily  cefTRIAXone   IVPB 1000 milliGRAM(s) IV Intermittent every 24 hours  cholecalciferol 1000 Unit(s) Oral daily  enoxaparin Injectable 40 milliGRAM(s) SubCutaneous daily  gemfibrozil 600 milliGRAM(s) Oral two times a day  metroNIDAZOLE  IVPB 500 milliGRAM(s) IV Intermittent every 8 hours  metroNIDAZOLE  IVPB      montelukast 10 milliGRAM(s) Oral every 24 hours  pantoprazole  Injectable 40 milliGRAM(s) IV Push two times a day  zinc sulfate 220 milliGRAM(s) Oral daily      RADIOLOGY & ADDITIONAL TESTS:    12/18/20: CT Abd/pelvis and Chest w/ Oral Cont and w/ IV Cont (12.18.20 @ 19:08) Nodular densities in both lungs and some are ground glass in appearance. Findings may represent pneumonia/pneumonitis.  Clinical correlation is recommended. Follow-up chest CT may be pursued in 4-6 weeks to ensure resolution.  New hepatic steatosis.   Splenomegaly.   Apparent segmental mural thickening of the ascending colon may be due to underdistention or nonspecific colitis. Clinical correlation is recommended.      12/17/20 : Xray Chest 1 View- PORTABLE-Urgent (12.17.20 @ 14:41) The lung fields and pleural  surfaces are unremarkable.        MICROBIOLOGY DATA:    C-Reactive Protein, Serum (12.18.20 @ 09:14)   C-Reactive Protein, Serum: 0.33 mg/dL Ferritin, Serum (12.18.20 @ 09:12)   Ferritin, Serum: 783 ng/mL     D-Dimer Assay, Quantitative (12.17.20 @ 14:29)  <150 ng/mL DDU   Respiratory Viral Panel with COVID-19 by JERRY (12.17.20 @ 15:11)   Rapid RVP Result: Detected   SARS-CoV-2: Detected:

## 2020-12-22 LAB
ALBUMIN SERPL ELPH-MCNC: 3.8 G/DL — SIGNIFICANT CHANGE UP (ref 3.5–5)
ALP SERPL-CCNC: 80 U/L — SIGNIFICANT CHANGE UP (ref 40–120)
ALT FLD-CCNC: 149 U/L DA — HIGH (ref 10–60)
ANION GAP SERPL CALC-SCNC: 9 MMOL/L — SIGNIFICANT CHANGE UP (ref 5–17)
AST SERPL-CCNC: 92 U/L — HIGH (ref 10–40)
BASOPHILS # BLD AUTO: 0.03 K/UL — SIGNIFICANT CHANGE UP (ref 0–0.2)
BASOPHILS NFR BLD AUTO: 0.5 % — SIGNIFICANT CHANGE UP (ref 0–2)
BILIRUB SERPL-MCNC: 0.7 MG/DL — SIGNIFICANT CHANGE UP (ref 0.2–1.2)
BUN SERPL-MCNC: 9 MG/DL — SIGNIFICANT CHANGE UP (ref 7–18)
CALCIUM SERPL-MCNC: 9.9 MG/DL — SIGNIFICANT CHANGE UP (ref 8.4–10.5)
CHLORIDE SERPL-SCNC: 104 MMOL/L — SIGNIFICANT CHANGE UP (ref 96–108)
CO2 SERPL-SCNC: 26 MMOL/L — SIGNIFICANT CHANGE UP (ref 22–31)
CREAT SERPL-MCNC: 1.2 MG/DL — SIGNIFICANT CHANGE UP (ref 0.5–1.3)
CRP SERPL-MCNC: 0.43 MG/DL — HIGH (ref 0–0.4)
D DIMER BLD IA.RAPID-MCNC: <150 NG/ML DDU — SIGNIFICANT CHANGE UP
EOSINOPHIL # BLD AUTO: 0.15 K/UL — SIGNIFICANT CHANGE UP (ref 0–0.5)
EOSINOPHIL NFR BLD AUTO: 2.5 % — SIGNIFICANT CHANGE UP (ref 0–6)
FERRITIN SERPL-MCNC: 1319 NG/ML — HIGH (ref 30–400)
GLUCOSE SERPL-MCNC: 102 MG/DL — HIGH (ref 70–99)
HCT VFR BLD CALC: 42.4 % — SIGNIFICANT CHANGE UP (ref 39–50)
HGB BLD-MCNC: 14.4 G/DL — SIGNIFICANT CHANGE UP (ref 13–17)
IMM GRANULOCYTES NFR BLD AUTO: 0.5 % — SIGNIFICANT CHANGE UP (ref 0–1.5)
LDH SERPL L TO P-CCNC: 229 U/L — HIGH (ref 120–225)
LYMPHOCYTES # BLD AUTO: 1.9 K/UL — SIGNIFICANT CHANGE UP (ref 1–3.3)
LYMPHOCYTES # BLD AUTO: 31.4 % — SIGNIFICANT CHANGE UP (ref 13–44)
MCHC RBC-ENTMCNC: 28.6 PG — SIGNIFICANT CHANGE UP (ref 27–34)
MCHC RBC-ENTMCNC: 34 GM/DL — SIGNIFICANT CHANGE UP (ref 32–36)
MCV RBC AUTO: 84.3 FL — SIGNIFICANT CHANGE UP (ref 80–100)
MONOCYTES # BLD AUTO: 0.67 K/UL — SIGNIFICANT CHANGE UP (ref 0–0.9)
MONOCYTES NFR BLD AUTO: 11.1 % — SIGNIFICANT CHANGE UP (ref 2–14)
NEUTROPHILS # BLD AUTO: 3.28 K/UL — SIGNIFICANT CHANGE UP (ref 1.8–7.4)
NEUTROPHILS NFR BLD AUTO: 54 % — SIGNIFICANT CHANGE UP (ref 43–77)
NRBC # BLD: 0 /100 WBCS — SIGNIFICANT CHANGE UP (ref 0–0)
PLATELET # BLD AUTO: 324 K/UL — SIGNIFICANT CHANGE UP (ref 150–400)
POTASSIUM SERPL-MCNC: 3.9 MMOL/L — SIGNIFICANT CHANGE UP (ref 3.5–5.3)
POTASSIUM SERPL-SCNC: 3.9 MMOL/L — SIGNIFICANT CHANGE UP (ref 3.5–5.3)
PROT SERPL-MCNC: 7.6 G/DL — SIGNIFICANT CHANGE UP (ref 6–8.3)
RBC # BLD: 5.03 M/UL — SIGNIFICANT CHANGE UP (ref 4.2–5.8)
RBC # FLD: 13 % — SIGNIFICANT CHANGE UP (ref 10.3–14.5)
SODIUM SERPL-SCNC: 139 MMOL/L — SIGNIFICANT CHANGE UP (ref 135–145)
WBC # BLD: 6.06 K/UL — SIGNIFICANT CHANGE UP (ref 3.8–10.5)
WBC # FLD AUTO: 6.06 K/UL — SIGNIFICANT CHANGE UP (ref 3.8–10.5)

## 2020-12-22 RX ADMIN — Medication 1000 UNIT(S): at 11:21

## 2020-12-22 RX ADMIN — ENOXAPARIN SODIUM 40 MILLIGRAM(S): 100 INJECTION SUBCUTANEOUS at 11:21

## 2020-12-22 RX ADMIN — ZINC SULFATE TAB 220 MG (50 MG ZINC EQUIVALENT) 220 MILLIGRAM(S): 220 (50 ZN) TAB at 11:21

## 2020-12-22 RX ADMIN — Medication 650 MILLIGRAM(S): at 10:07

## 2020-12-22 RX ADMIN — Medication 1000 MILLIGRAM(S): at 11:21

## 2020-12-22 RX ADMIN — Medication 100 MILLIGRAM(S): at 21:31

## 2020-12-22 RX ADMIN — Medication 100 MILLIGRAM(S): at 13:21

## 2020-12-22 RX ADMIN — Medication 600 MILLIGRAM(S): at 05:08

## 2020-12-22 RX ADMIN — PANTOPRAZOLE SODIUM 40 MILLIGRAM(S): 20 TABLET, DELAYED RELEASE ORAL at 17:00

## 2020-12-22 RX ADMIN — ONDANSETRON 4 MILLIGRAM(S): 8 TABLET, FILM COATED ORAL at 08:52

## 2020-12-22 RX ADMIN — Medication 100 MILLIGRAM(S): at 05:09

## 2020-12-22 RX ADMIN — Medication 30 MILLILITER(S): at 12:06

## 2020-12-22 RX ADMIN — CEFTRIAXONE 100 MILLIGRAM(S): 500 INJECTION, POWDER, FOR SOLUTION INTRAMUSCULAR; INTRAVENOUS at 17:00

## 2020-12-22 RX ADMIN — Medication 10 MILLIGRAM(S): at 12:01

## 2020-12-22 RX ADMIN — Medication 100 MILLIGRAM(S): at 12:43

## 2020-12-22 RX ADMIN — Medication 600 MILLIGRAM(S): at 17:00

## 2020-12-22 RX ADMIN — MONTELUKAST 10 MILLIGRAM(S): 4 TABLET, CHEWABLE ORAL at 21:31

## 2020-12-22 RX ADMIN — Medication 650 MILLIGRAM(S): at 09:07

## 2020-12-22 RX ADMIN — PANTOPRAZOLE SODIUM 40 MILLIGRAM(S): 20 TABLET, DELAYED RELEASE ORAL at 05:08

## 2020-12-22 NOTE — PHARMACOTHERAPY INTERVENTION NOTE - COMMENTS
31 y/o M is on IV Flagyl for colitis, day #4, recommended evaluation and PO switch; as per NP pt is vomiting and not toleration PO

## 2020-12-22 NOTE — PROGRESS NOTE ADULT - SUBJECTIVE AND OBJECTIVE BOX
CHIEF COMPLAINT:Patient is a 30y old  Male who presents with a chief complaint of Covid infection.Pt appears comfortable.    	  REVIEW OF SYSTEMS:  CONSTITUTIONAL: No fever, weight loss, or fatigue  EYES: No eye pain, visual disturbances, or discharge  ENT:  No difficulty hearing, tinnitus, vertigo; No sinus or throat pain  NECK: No pain or stiffness  RESPIRATORY: No cough, wheezing, chills or hemoptysis; No Shortness of Breath  CARDIOVASCULAR: No chest pain, palpitations, passing out, dizziness, or leg swelling  GASTROINTESTINAL: No abdominal or epigastric pain. No nausea, vomiting, or hematemesis; No diarrhea or constipation. No melena or hematochezia.  GENITOURINARY: No dysuria, frequency, hematuria, or incontinence  NEUROLOGICAL: No headaches, memory loss, loss of strength, numbness, or tremors  SKIN: No itching, burning, rashes, or lesions   LYMPH Nodes: No enlarged glands  ENDOCRINE: No heat or cold intolerance; No hair loss  MUSCULOSKELETAL: No joint pain or swelling; No muscle, back, or extremity pain  PSYCHIATRIC: No depression, anxiety, mood swings, or difficulty sleeping  HEME/LYMPH: No easy bruising, or bleeding gums  ALLERGY AND IMMUNOLOGIC: No hives or eczema	      PHYSICAL EXAM:  T(C): 36.3 (12-22-20 @ 09:00), Max: 36.9 (12-21-20 @ 20:42)  HR: 68 (12-22-20 @ 09:00) (63 - 85)  BP: 115/65 (12-22-20 @ 09:00) (108/61 - 118/90)  RR: 17 (12-22-20 @ 09:00) (17 - 18)  SpO2: 95% (12-22-20 @ 09:00) (93% - 98%)      Appearance: Normal	  HEENT:   Normal oral mucosa, PERRL, EOMI	  Lymphatic: No lymphadenopathy  Cardiovascular: Normal S1 S2, No JVD, No murmurs, No edema  Respiratory: Lungs clear to auscultation	  Psychiatry: A & O x 3, Mood & affect appropriate  Gastrointestinal:  Soft, Non-tender, + BS	  Skin: No rashes, No ecchymoses, No cyanosis	  Neurologic: Non-focal  Extremities: Normal range of motion, No clubbing, cyanosis or edema  Vascular: Peripheral pulses palpable 2+ bilaterally    MEDICATIONS  (STANDING):  ascorbic acid 1000 milliGRAM(s) Oral daily  cefTRIAXone   IVPB 1000 milliGRAM(s) IV Intermittent every 24 hours  cholecalciferol 1000 Unit(s) Oral daily  enoxaparin Injectable 40 milliGRAM(s) SubCutaneous daily  gemfibrozil 600 milliGRAM(s) Oral two times a day  metroNIDAZOLE  IVPB 500 milliGRAM(s) IV Intermittent every 8 hours  metroNIDAZOLE  IVPB      montelukast 10 milliGRAM(s) Oral every 24 hours  pantoprazole  Injectable 40 milliGRAM(s) IV Push two times a day  zinc sulfate 220 milliGRAM(s) Oral daily    	  	  LABS:	 	                       14.4   6.06  )-----------( 324      ( 22 Dec 2020 08:46 )             42.4     12-22    139  |  104  |  9   ----------------------------<  102<H>  3.9   |  26  |  1.20    Ca    9.9      22 Dec 2020 08:46    TPro  7.6  /  Alb  3.8  /  TBili  0.7  /  DBili  x   /  AST  92<H>  /  ALT  149<H>  /  AlkPhos  80  12-22    Lipid Profile: Cholesterol 174  LDL --  HDL 28        TSH: Thyroid Stimulating Hormone, Serum: 1.64 uU/mL (12-18 @ 06:05)

## 2020-12-22 NOTE — PROGRESS NOTE ADULT - ASSESSMENT
31 y/o   male who  tested positive for covid  on 12/10/20 presented  with shortness of breath and blood in stool. Patient was seen in the ED on 12/17  and  discharged. Pt return to the ED with abdominal pain,   diarrhea and blood in stools. On admission pt was positive for  COVID, Troponin is negative. EKG NSR   CT scan of abd showed nonspecific colitis.   CT chest showed nodular densities in both lungs and some are groundglass in appearance concerning for pneumonia/pneumonitis.   Started on Ceftriaxone and Flagyl   Pt seen at bedside, states  abdominal is better, still mild crampy pain but states feels hungry and wants to try solid food. Denies vomiting since yesterday morning. NO diarrhea.  Also complaints of body aches and persistent  headache/ pressure in ears improved with IV tylenol   Saturating 97 % on RA. No difficulties breathing. No fever or leucocytosis

## 2020-12-22 NOTE — PROGRESS NOTE ADULT - SUBJECTIVE AND OBJECTIVE BOX
Patient is seen and examined at the bed side, is afebrile. He is tolerating Abx well.      REVIEW OF SYSTEMS: All other review systems are negative      ALLERGIES: No Known Allergies      PHYSICAL EXAMINATION:     Vital Signs Last 24 Hrs  T(C): 36.6 (22 Dec 2020 14:21), Max: 36.9 (21 Dec 2020 20:42)  T(F): 97.8 (22 Dec 2020 14:21), Max: 98.4 (21 Dec 2020 20:42)  HR: 64 (22 Dec 2020 14:21) (63 - 84)  BP: 110/60 (22 Dec 2020 14:21) (108/61 - 115/65)  BP(mean): --  RR: 18 (22 Dec 2020 14:21) (17 - 18)  SpO2: 94% (22 Dec 2020 14:21) (93% - 96%)      LABS:                        14.4   6.06  )-----------( 324      ( 22 Dec 2020 08:46 )             42.4                                       14.1   5.65  )-----------( 282      ( 21 Dec 2020 06:50 )             41.7       12-22    139  |  104  |  9   ----------------------------<  102<H>  3.9   |  26  |  1.20    Ca    9.9      22 Dec 2020 08:46    TPro  7.6  /  Alb  3.8  /  TBili  0.7  /  DBili  x   /  AST  92<H>  /  ALT  149<H>  /  AlkPhos  80  12-22 12-20    139  |  106  |  10  ----------------------------<  110<H>  4.0   |  27  |  1.15    Ca    10.0      20 Dec 2020 07:10    TPro  7.9  /  Alb  3.6  /  TBili  0.9  /  DBili  x   /  AST  49<H>  /  ALT  103<H>  /  AlkPhos  84  12-20          MEDICATIONS  (STANDING):    ascorbic acid 1000 milliGRAM(s) Oral daily  cefTRIAXone   IVPB 1000 milliGRAM(s) IV Intermittent every 24 hours  cholecalciferol 1000 Unit(s) Oral daily  enoxaparin Injectable 40 milliGRAM(s) SubCutaneous daily  gemfibrozil 600 milliGRAM(s) Oral two times a day  metroNIDAZOLE  IVPB 500 milliGRAM(s) IV Intermittent every 8 hours  metroNIDAZOLE  IVPB      montelukast 10 milliGRAM(s) Oral every 24 hours  pantoprazole  Injectable 40 milliGRAM(s) IV Push two times a day  zinc sulfate 220 milliGRAM(s) Oral daily      RADIOLOGY & ADDITIONAL TESTS:    12/18/20: CT Abd/pelvis and Chest w/ Oral Cont and w/ IV Cont (12.18.20 @ 19:08) Nodular densities in both lungs and some are ground glass in appearance. Findings may represent pneumonia/pneumonitis.  Clinical correlation is recommended. Follow-up chest CT may be pursued in 4-6 weeks to ensure resolution.  New hepatic steatosis.   Splenomegaly.   Apparent segmental mural thickening of the ascending colon may be due to underdistention or nonspecific colitis. Clinical correlation is recommended.      12/17/20 : Xray Chest 1 View- PORTABLE-Urgent (12.17.20 @ 14:41) The lung fields and pleural  surfaces are unremarkable.        MICROBIOLOGY DATA:    MRSA/MSSA PCR (12.20.20 @ 09:20)   MRSA PCR Result.: NotDetec    C-Reactive Protein, Serum (12.18.20 @ 09:14)   C-Reactive Protein, Serum: 0.33 mg/dL Ferritin, Serum (12.18.20 @ 09:12)   Ferritin, Serum: 783 ng/mL     D-Dimer Assay, Quantitative (12.17.20 @ 14:29)  <150 ng/mL DDU   Respiratory Viral Panel with COVID-19 by JERRY (12.17.20 @ 15:11)   Rapid RVP Result: Detected   SARS-CoV-2: Detected:                    Patient is seen and examined at the bed side, is afebrile. He is feeling much better now, nausea and abdominal pain has improved.       REVIEW OF SYSTEMS: All other review systems are negative      ALLERGIES: No Known Allergies      PHYSICAL EXAMINATION:   Vital Signs Last 24 Hrs  T(C): 36.6 (22 Dec 2020 14:21), Max: 36.9 (21 Dec 2020 20:42)  T(F): 97.8 (22 Dec 2020 14:21), Max: 98.4 (21 Dec 2020 20:42)  HR: 64 (22 Dec 2020 14:21) (63 - 84)  BP: 110/60 (22 Dec 2020 14:21) (108/61 - 115/65)  BP(mean): --  RR: 18 (22 Dec 2020 14:21) (17 - 18)  SpO2: 94% (22 Dec 2020 14:21) (93% - 96%)        LABS:                        14.4   6.06  )-----------( 324      ( 22 Dec 2020 08:46 )             42.4                                       14.1   5.65  )-----------( 282      ( 21 Dec 2020 06:50 )             41.7       12-22    139  |  104  |  9   ----------------------------<  102<H>  3.9   |  26  |  1.20    Ca    9.9      22 Dec 2020 08:46    TPro  7.6  /  Alb  3.8  /  TBili  0.7  /  DBili  x   /  AST  92<H>  /  ALT  149<H>  /  AlkPhos  80  12-22 12-20    139  |  106  |  10  ----------------------------<  110<H>  4.0   |  27  |  1.15    Ca    10.0      20 Dec 2020 07:10    TPro  7.9  /  Alb  3.6  /  TBili  0.9  /  DBili  x   /  AST  49<H>  /  ALT  103<H>  /  AlkPhos  84  12-20        MEDICATIONS  (STANDING):    ascorbic acid 1000 milliGRAM(s) Oral daily  cefTRIAXone   IVPB 1000 milliGRAM(s) IV Intermittent every 24 hours  cholecalciferol 1000 Unit(s) Oral daily  enoxaparin Injectable 40 milliGRAM(s) SubCutaneous daily  gemfibrozil 600 milliGRAM(s) Oral two times a day  metroNIDAZOLE  IVPB 500 milliGRAM(s) IV Intermittent every 8 hours  metroNIDAZOLE  IVPB      montelukast 10 milliGRAM(s) Oral every 24 hours  pantoprazole  Injectable 40 milliGRAM(s) IV Push two times a day  zinc sulfate 220 milliGRAM(s) Oral daily      RADIOLOGY & ADDITIONAL TESTS:    12/18/20: CT Abd/pelvis and Chest w/ Oral Cont and w/ IV Cont (12.18.20 @ 19:08) Nodular densities in both lungs and some are ground glass in appearance. Findings may represent pneumonia/pneumonitis.  Clinical correlation is recommended. Follow-up chest CT may be pursued in 4-6 weeks to ensure resolution.  New hepatic steatosis.   Splenomegaly.   Apparent segmental mural thickening of the ascending colon may be due to underdistention or nonspecific colitis. Clinical correlation is recommended.      12/17/20 : Xray Chest 1 View- PORTABLE-Urgent (12.17.20 @ 14:41) The lung fields and pleural  surfaces are unremarkable.        MICROBIOLOGY DATA:    MRSA/MSSA PCR (12.20.20 @ 09:20)   MRSA PCR Result.: NotDetec    C-Reactive Protein, Serum (12.18.20 @ 09:14)   C-Reactive Protein, Serum: 0.33 mg/dL Ferritin, Serum (12.18.20 @ 09:12)   Ferritin, Serum: 783 ng/mL     D-Dimer Assay, Quantitative (12.17.20 @ 14:29)  <150 ng/mL DDU   Respiratory Viral Panel with COVID-19 by JERRY (12.17.20 @ 15:11)   Rapid RVP Result: Detected   SARS-CoV-2: Detected:

## 2020-12-22 NOTE — PROGRESS NOTE ADULT - ASSESSMENT
31 YO pt who works in a [employer], was tested positive for covid 19 on 12/10/20 is presenting with shortness of breath and blood in stool. Patient was seen in the ED on friday with same symptoms and was discharged ,He is coming back with hematemesis, diarrhea and blood in stools,chest pain and shortness of breath,COVID+ pneumonia.  1.Echocardiogram as outpatient.  2.Prediabetes-diet.  3.Lipid d/o-lopid 600mg bid.  4.Abdominal pain-GI f/u.  5.COVID+-abx,zinc,singulair,ID f/u..  6.GI and DVT prophylaxis.

## 2020-12-22 NOTE — PROGRESS NOTE ADULT - ASSESSMENT
1. Colitis  2. Hematochezia stopped   3. Hematemesis stopped   4. COVID-19 positive  5. Elevated LFT's  improving    Suggestions:    1. Monitor H/H  2. Transfuse PRBC as needed  3. Avoid NSAID  4. Protonix 40mg daily  5. Follow up LFT's  6. DVT prophylaxis

## 2020-12-22 NOTE — PROGRESS NOTE ADULT - PROBLEM SELECTOR PLAN 1
CT chest showed nodular densities in both lungs and some are groundglass in appearance concerning for pneumonia/pneumonitis.   saturating 96% room air  continue symptomatic treatment  monitor oxygen saturation, supplemental oxygen as needed   start on IV dexamethasone 6 mg daily and Remdesivir if saturation fall to 93 %  continue with montelukast and bronchodilators   c/o Vitamin D, Zinc and Vitamin C   ID on board Dr Perea

## 2020-12-22 NOTE — PROGRESS NOTE ADULT - SUBJECTIVE AND OBJECTIVE BOX
NP Note discussed with  primary attending    Patient is a 30y old  Male who presents with a chief complaint of Covid infection (22 Dec 2020 08:47)      INTERVAL HPI/OVERNIGHT EVENTS: no new complaints    MEDICATIONS  (STANDING):  ascorbic acid 1000 milliGRAM(s) Oral daily  cefTRIAXone   IVPB 1000 milliGRAM(s) IV Intermittent every 24 hours  cholecalciferol 1000 Unit(s) Oral daily  enoxaparin Injectable 40 milliGRAM(s) SubCutaneous daily  gemfibrozil 600 milliGRAM(s) Oral two times a day  metroNIDAZOLE  IVPB 500 milliGRAM(s) IV Intermittent every 8 hours  metroNIDAZOLE  IVPB      montelukast 10 milliGRAM(s) Oral every 24 hours  pantoprazole  Injectable 40 milliGRAM(s) IV Push two times a day  zinc sulfate 220 milliGRAM(s) Oral daily    MEDICATIONS  (PRN):  ALBUTerol    90 MICROgram(s) HFA Inhaler 2 Puff(s) Inhalation every 6 hours PRN Shortness of Breath  aluminum hydroxide/magnesium hydroxide/simethicone Suspension 30 milliLiter(s) Oral every 8 hours PRN Dyspepsia  guaiFENesin   Syrup  (Sugar-Free) 100 milliGRAM(s) Oral every 6 hours PRN Cough  metoclopramide Injectable 10 milliGRAM(s) IV Push every 8 hours PRN nausea  morphine  - Injectable 2 milliGRAM(s) IV Push every 6 hours PRN Moderate Pain (4 - 6)  morphine  - Injectable 4 milliGRAM(s) IV Push every 6 hours PRN Severe Pain (7 - 10)      __________________________________________________  REVIEW OF SYSTEMS:    CONSTITUTIONAL: No fever,   EYES: no acute visual disturbances  NECK: No pain or stiffness  RESPIRATORY: No cough; No shortness of breath  CARDIOVASCULAR: No chest pain, no palpitations  GASTROINTESTINAL: abdominal pain improved, + some intermittent  nausea, no vomiting; No diarrhea   NEUROLOGICAL: +  headache or numbness, no tremors  MUSCULOSKELETAL: + muscle pain  GENITOURINARY: no dysuria, no frequency, no hesitancy  PSYCHIATRY: no depression , no anxiety  ALL OTHER  ROS negative        Vital Signs Last 24 Hrs  T(C): 36.3 (22 Dec 2020 09:00), Max: 36.9 (21 Dec 2020 20:42)  T(F): 97.4 (22 Dec 2020 09:00), Max: 98.4 (21 Dec 2020 20:42)  HR: 68 (22 Dec 2020 09:00) (63 - 85)  BP: 115/65 (22 Dec 2020 09:00) (108/61 - 118/90)  BP(mean): --  RR: 17 (22 Dec 2020 09:00) (17 - 18)  SpO2: 95% (22 Dec 2020 09:00) (93% - 98%)    ________________________________________________  PHYSICAL EXAM:  GENERAL: NAD  HEENT: Normocephalic;  conjunctivae and sclerae clear; moist mucous membranes;   NECK : supple  CHEST/LUNG: Clear to ausculitation bilaterally  HEART: S1 S2  regular; no murmurs, gallops or rubs  ABDOMEN: Soft, Nontender, Nondistended; Bowel sounds present  EXTREMITIES: no cyanosis; no edema; no calf tenderness  SKIN: warm and dry; no rash  NERVOUS SYSTEM:  Awake and alert; Oriented  to place, person and time ; no new deficits    _________________________________________________  LABS:                        14.4   6.06  )-----------( 324      ( 22 Dec 2020 08:46 )             42.4     12-22    139  |  104  |  9   ----------------------------<  102<H>  3.9   |  26  |  1.20    Ca    9.9      22 Dec 2020 08:46    TPro  7.6  /  Alb  3.8  /  TBili  0.7  /  DBili  x   /  AST  92<H>  /  ALT  149<H>  /  AlkPhos  80  12-22        CAPILLARY BLOOD GLUCOSE            RADIOLOGY & ADDITIONAL TESTS:    Imaging Personally Reviewed:  YES/NO    Consultant(s) Notes Reviewed:   YES/ No    Care Discussed with Consultants :     Plan of care was discussed with patient and /or primary care giver; all questions and concerns were addressed and care was aligned with patient's wishes.

## 2020-12-22 NOTE — PROGRESS NOTE ADULT - SUBJECTIVE AND OBJECTIVE BOX
[   ] ICU                                          [   ] CCU                                      [ X  ] Medical Floor      30 year old male who works in a [employer], was tested positive for COVID-19 on 12/10/20 is presenting with shortness of breath and blood in stool. Patient was seen in the emergency room on Friday with same symptoms and was discharged ,He is coming back with hematemesis, diarrhea and blood in stools. Patient endorses severe chest pain that is parasternal localized increases with eating. Patient also endorses vomiting whenever he tries to eat and loss of appetite. Patient reports that he was saturating around 85% at home, however, in the hospital he is saturating 95% on RA and not in Respiratory distress.     Today patient appears comfortable and no new complaints reported, No abdominal pain, N/V, hematemesis, hematochezia, melena, fever, chills, chest pain, SOB, cough or diarrhea reported.       PAIN MANAGEMENT:  Pain Scale:                0 /10  Pain Location:      Prior Colonoscopy:  Unknown    PAST MEDICAL HISTORY  Colonic polyp       PAST SURGICAL HISTORY  No significant surgical history      Allergies    No Known Allergies    Intolerances  None    SOCIAL HISTORY  Advanced Directives:       [ X ] Full Code       [  ] DNR  Marital Status:         [  ] M      [ X ] S      [  ] D       [  ] W  Children:       [ X ] Yes      [  ] No  Occupation:        [  ] Employed       [ X ] Unemployed       [  ] Retired  Diet:       [ X ] Regular       [  ] PEG feeding          [  ] NG tube feeding  Drug Use:           [ X ] Patient denied          [  ] Yes  Alcohol:           [X  ] No             [  ] Yes (socially)         [  ] Yes (chronic)  Tobacco:           [  ] Yes           [ X ] No    FAMILY HISTORY  [X  ] Heart Disease            [X  ] Diabetes             [ X ] HTN             [  ] Colon Cancer             [  ] Stomach Cancer              [  ] Pancreatic Cancer         VITALS  Vital Signs Last 24 Hrs  T(C): 36.4 (22 Dec 2020 04:55), Max: 36.9 (21 Dec 2020 20:42)  T(F): 97.5 (22 Dec 2020 04:55), Max: 98.4 (21 Dec 2020 20:42)  HR: 63 (22 Dec 2020 04:55) (63 - 85)  BP: 108/61 (22 Dec 2020 04:55) (108/61 - 118/90)   RR: 17 (22 Dec 2020 04:55) (17 - 18)  SpO2: 93% (22 Dec 2020 04:55) (93% - 98%)       MEDICATIONS  (STANDING):  ascorbic acid 1000 milliGRAM(s) Oral daily  cefTRIAXone   IVPB 1000 milliGRAM(s) IV Intermittent every 24 hours  cholecalciferol 1000 Unit(s) Oral daily  enoxaparin Injectable 40 milliGRAM(s) SubCutaneous daily  gemfibrozil 600 milliGRAM(s) Oral two times a day  metroNIDAZOLE  IVPB 500 milliGRAM(s) IV Intermittent every 8 hours  metroNIDAZOLE  IVPB      montelukast 10 milliGRAM(s) Oral every 24 hours  pantoprazole  Injectable 40 milliGRAM(s) IV Push two times a day  zinc sulfate 220 milliGRAM(s) Oral daily    MEDICATIONS  (PRN):  acetaminophen   Tablet .. 650 milliGRAM(s) Oral every 6 hours PRN Temp greater or equal to 38C (100.4F), Mild Pain (1 - 3)  ALBUTerol    90 MICROgram(s) HFA Inhaler 2 Puff(s) Inhalation every 6 hours PRN Shortness of Breath  aluminum hydroxide/magnesium hydroxide/simethicone Suspension 30 milliLiter(s) Oral every 8 hours PRN Dyspepsia  guaiFENesin   Syrup  (Sugar-Free) 100 milliGRAM(s) Oral every 6 hours PRN Cough  metoclopramide Injectable 10 milliGRAM(s) IV Push every 8 hours PRN nausea  morphine  - Injectable 2 milliGRAM(s) IV Push every 6 hours PRN Moderate Pain (4 - 6)  morphine  - Injectable 4 milliGRAM(s) IV Push every 6 hours PRN Severe Pain (7 - 10)  ondansetron Injectable 4 milliGRAM(s) IV Push every 8 hours PRN Nausea                            14.1   5.65  )-----------( 282      ( 21 Dec 2020 06:50 )             41.7       12-21    140  |  105  |  9   ----------------------------<  108<H>  3.7   |  26  |  1.13    Ca    9.8      21 Dec 2020 06:50    TPro  7.1  /  Alb  3.4<L>  /  TBili  0.8  /  DBili  x   /  AST  38  /  ALT  89<H>  /  AlkPhos  78  12-21

## 2020-12-22 NOTE — PROGRESS NOTE ADULT - ASSESSMENT
Patient is a 30y old  Male who works in a [employer], was tested positive for covid 19 on 12/10/20 and now presenting with shortness of breath and blood in stool. Patient was seen in the ED on Friday with same symptoms and was discharged ,He is coming back with hematemesis, diarrhea and blood in stools. Patient endorses severe chest pain that is parasternal localized , increases with eating. Patient also endorses vomiting whenever he tries to eat and loss of appetite. Patient reports that he was saturating around 85% at home, however, in the hospital he is saturating 95% on RA and not in Respiratory distress.  Off note patient has history of constipation and was found to have polyp few years ago, On admission, he found to have no fever, no leukocytosis but tachycardia and positive COVID test. The CXR and Troponin is negative. The ID consult requested to assist with further management.     # COVID infection- currently saturating well at Room air, No indication of Dexamethasone OR Remdesivir   # Nonspecific Colitis OR underdistention  ON CT abd/pelvis shows   Apparent segmental mural thickening of the ascending colon may be due to underdistention or nonspecific colitis.     would recommend:    1.  Continue Ceftriaxone and Flagyl X total of 7 days   2. Continue Supportive care, antiemetic agents    3. Monitor oxygen saturation closely and start on IV dexamethasone 6 mg daily and Remdesivir if saturation fall to 93 % or less  4. Continue Bronchodilator as needed  5. COVID precautions     d/w  Dr. Mclean and Nursing staff     Attending Attestation:    Spent more than 45 minutes on total encounter, more than 50 % of the visit was spent counseling and/or coordinating care by the Attending physician. Patient is a 30y old  Male who works in a [employer], was tested positive for covid 19 on 12/10/20 and now presenting with shortness of breath and blood in stool. Patient was seen in the ED on Friday with same symptoms and was discharged ,He is coming back with hematemesis, diarrhea and blood in stools. Patient endorses severe chest pain that is parasternal localized , increases with eating. Patient also endorses vomiting whenever he tries to eat and loss of appetite. Patient reports that he was saturating around 85% at home, however, in the hospital he is saturating 95% on RA and not in Respiratory distress.  Off note patient has history of constipation and was found to have polyp few years ago, On admission, he found to have no fever, no leukocytosis but tachycardia and positive COVID test. The CXR and Troponin is negative. The ID consult requested to assist with further management.     # COVID infection- currently saturating well at Room air, No indication of Dexamethasone OR Remdesivir   # Nonspecific Colitis OR underdistention  ON CT abd/pelvis shows   Apparent segmental mural thickening of the ascending colon may be due to underdistention or nonspecific colitis.     would recommend:    1. OOB to chair   2.  Continue Ceftriaxone and Flagyl X total of 7 days   3. Continue Supportive care, antiemetic agents    4. Monitor oxygen saturation closely and start on IV dexamethasone 6 mg daily and Remdesivir if saturation fall to 93 % or less  5. Continue Bronchodilator as needed  6. COVID precautions     d/w  Dr. Mclean and Nursing staff     Attending Attestation:    Spent more than 45 minutes on total encounter, more than 50 % of the visit was spent counseling and/or coordinating care by the Attending physician.

## 2020-12-22 NOTE — PROGRESS NOTE ADULT - PROBLEM SELECTOR PLAN 2
CT abdomen + nonspecific colitis, symptoms improving   continue Ceftriaxone and Flagyl   clear liquid diet tolerated,  advance to solid food   continue antiemetic and analgesics as needed   Dr Perea ID follows  Dr Murray on board

## 2020-12-23 DIAGNOSIS — Z71.89 OTHER SPECIFIED COUNSELING: ICD-10-CM

## 2020-12-23 DIAGNOSIS — Z02.9 ENCOUNTER FOR ADMINISTRATIVE EXAMINATIONS, UNSPECIFIED: ICD-10-CM

## 2020-12-23 LAB
ALBUMIN SERPL ELPH-MCNC: 3.7 G/DL — SIGNIFICANT CHANGE UP (ref 3.5–5)
ALP SERPL-CCNC: 80 U/L — SIGNIFICANT CHANGE UP (ref 40–120)
ALT FLD-CCNC: 152 U/L DA — HIGH (ref 10–60)
ANION GAP SERPL CALC-SCNC: 11 MMOL/L — SIGNIFICANT CHANGE UP (ref 5–17)
AST SERPL-CCNC: 73 U/L — HIGH (ref 10–40)
BASOPHILS # BLD AUTO: 0.03 K/UL — SIGNIFICANT CHANGE UP (ref 0–0.2)
BASOPHILS NFR BLD AUTO: 0.4 % — SIGNIFICANT CHANGE UP (ref 0–2)
BILIRUB SERPL-MCNC: 0.7 MG/DL — SIGNIFICANT CHANGE UP (ref 0.2–1.2)
BUN SERPL-MCNC: 11 MG/DL — SIGNIFICANT CHANGE UP (ref 7–18)
CALCIUM SERPL-MCNC: 10 MG/DL — SIGNIFICANT CHANGE UP (ref 8.4–10.5)
CHLORIDE SERPL-SCNC: 104 MMOL/L — SIGNIFICANT CHANGE UP (ref 96–108)
CO2 SERPL-SCNC: 24 MMOL/L — SIGNIFICANT CHANGE UP (ref 22–31)
CREAT SERPL-MCNC: 1.2 MG/DL — SIGNIFICANT CHANGE UP (ref 0.5–1.3)
CRP SERPL-MCNC: 0.35 MG/DL — SIGNIFICANT CHANGE UP (ref 0–0.4)
EOSINOPHIL # BLD AUTO: 0.14 K/UL — SIGNIFICANT CHANGE UP (ref 0–0.5)
EOSINOPHIL NFR BLD AUTO: 1.8 % — SIGNIFICANT CHANGE UP (ref 0–6)
GLUCOSE SERPL-MCNC: 102 MG/DL — HIGH (ref 70–99)
HCT VFR BLD CALC: 44.8 % — SIGNIFICANT CHANGE UP (ref 39–50)
HGB BLD-MCNC: 14.9 G/DL — SIGNIFICANT CHANGE UP (ref 13–17)
IMM GRANULOCYTES NFR BLD AUTO: 0.3 % — SIGNIFICANT CHANGE UP (ref 0–1.5)
LYMPHOCYTES # BLD AUTO: 2.05 K/UL — SIGNIFICANT CHANGE UP (ref 1–3.3)
LYMPHOCYTES # BLD AUTO: 26.3 % — SIGNIFICANT CHANGE UP (ref 13–44)
MAGNESIUM SERPL-MCNC: 2.2 MG/DL — SIGNIFICANT CHANGE UP (ref 1.6–2.6)
MCHC RBC-ENTMCNC: 28.2 PG — SIGNIFICANT CHANGE UP (ref 27–34)
MCHC RBC-ENTMCNC: 33.3 GM/DL — SIGNIFICANT CHANGE UP (ref 32–36)
MCV RBC AUTO: 84.8 FL — SIGNIFICANT CHANGE UP (ref 80–100)
MONOCYTES # BLD AUTO: 0.72 K/UL — SIGNIFICANT CHANGE UP (ref 0–0.9)
MONOCYTES NFR BLD AUTO: 9.3 % — SIGNIFICANT CHANGE UP (ref 2–14)
NEUTROPHILS # BLD AUTO: 4.82 K/UL — SIGNIFICANT CHANGE UP (ref 1.8–7.4)
NEUTROPHILS NFR BLD AUTO: 61.9 % — SIGNIFICANT CHANGE UP (ref 43–77)
NRBC # BLD: 0 /100 WBCS — SIGNIFICANT CHANGE UP (ref 0–0)
PHOSPHATE SERPL-MCNC: 2.9 MG/DL — SIGNIFICANT CHANGE UP (ref 2.5–4.5)
PLATELET # BLD AUTO: 320 K/UL — SIGNIFICANT CHANGE UP (ref 150–400)
POTASSIUM SERPL-MCNC: 3.7 MMOL/L — SIGNIFICANT CHANGE UP (ref 3.5–5.3)
POTASSIUM SERPL-SCNC: 3.7 MMOL/L — SIGNIFICANT CHANGE UP (ref 3.5–5.3)
PROT SERPL-MCNC: 7.7 G/DL — SIGNIFICANT CHANGE UP (ref 6–8.3)
RBC # BLD: 5.28 M/UL — SIGNIFICANT CHANGE UP (ref 4.2–5.8)
RBC # FLD: 13 % — SIGNIFICANT CHANGE UP (ref 10.3–14.5)
SODIUM SERPL-SCNC: 139 MMOL/L — SIGNIFICANT CHANGE UP (ref 135–145)
WBC # BLD: 7.78 K/UL — SIGNIFICANT CHANGE UP (ref 3.8–10.5)
WBC # FLD AUTO: 7.78 K/UL — SIGNIFICANT CHANGE UP (ref 3.8–10.5)

## 2020-12-23 RX ORDER — ACETAMINOPHEN 500 MG
650 TABLET ORAL ONCE
Refills: 0 | Status: COMPLETED | OUTPATIENT
Start: 2020-12-23 | End: 2020-12-23

## 2020-12-23 RX ORDER — MORPHINE SULFATE 50 MG/1
2 CAPSULE, EXTENDED RELEASE ORAL EVERY 6 HOURS
Refills: 0 | Status: DISCONTINUED | OUTPATIENT
Start: 2020-12-23 | End: 2020-12-24

## 2020-12-23 RX ORDER — ONDANSETRON 8 MG/1
4 TABLET, FILM COATED ORAL EVERY 8 HOURS
Refills: 0 | Status: DISCONTINUED | OUTPATIENT
Start: 2020-12-23 | End: 2020-12-26

## 2020-12-23 RX ORDER — ONDANSETRON 8 MG/1
4 TABLET, FILM COATED ORAL ONCE
Refills: 0 | Status: COMPLETED | OUTPATIENT
Start: 2020-12-23 | End: 2020-12-23

## 2020-12-23 RX ORDER — MORPHINE SULFATE 50 MG/1
1 CAPSULE, EXTENDED RELEASE ORAL EVERY 6 HOURS
Refills: 0 | Status: DISCONTINUED | OUTPATIENT
Start: 2020-12-23 | End: 2020-12-24

## 2020-12-23 RX ADMIN — ONDANSETRON 4 MILLIGRAM(S): 8 TABLET, FILM COATED ORAL at 12:02

## 2020-12-23 RX ADMIN — PANTOPRAZOLE SODIUM 40 MILLIGRAM(S): 20 TABLET, DELAYED RELEASE ORAL at 05:27

## 2020-12-23 RX ADMIN — Medication 100 MILLIGRAM(S): at 21:59

## 2020-12-23 RX ADMIN — PANTOPRAZOLE SODIUM 40 MILLIGRAM(S): 20 TABLET, DELAYED RELEASE ORAL at 17:10

## 2020-12-23 RX ADMIN — ZINC SULFATE TAB 220 MG (50 MG ZINC EQUIVALENT) 220 MILLIGRAM(S): 220 (50 ZN) TAB at 08:17

## 2020-12-23 RX ADMIN — Medication 600 MILLIGRAM(S): at 17:10

## 2020-12-23 RX ADMIN — Medication 100 MILLIGRAM(S): at 05:27

## 2020-12-23 RX ADMIN — CEFTRIAXONE 100 MILLIGRAM(S): 500 INJECTION, POWDER, FOR SOLUTION INTRAMUSCULAR; INTRAVENOUS at 17:10

## 2020-12-23 RX ADMIN — Medication 650 MILLIGRAM(S): at 18:18

## 2020-12-23 RX ADMIN — Medication 100 MILLIGRAM(S): at 08:17

## 2020-12-23 RX ADMIN — Medication 1000 MILLIGRAM(S): at 08:17

## 2020-12-23 RX ADMIN — Medication 650 MILLIGRAM(S): at 17:44

## 2020-12-23 RX ADMIN — Medication 100 MILLIGRAM(S): at 13:18

## 2020-12-23 RX ADMIN — Medication 1000 UNIT(S): at 08:17

## 2020-12-23 RX ADMIN — ENOXAPARIN SODIUM 40 MILLIGRAM(S): 100 INJECTION SUBCUTANEOUS at 08:17

## 2020-12-23 RX ADMIN — Medication 600 MILLIGRAM(S): at 05:27

## 2020-12-23 NOTE — DIETITIAN INITIAL EVALUATION ADULT. - PERTINENT MEDS FT
MEDICATIONS  (STANDING):  ascorbic acid 1000 milliGRAM(s) Oral daily  cefTRIAXone   IVPB 1000 milliGRAM(s) IV Intermittent every 24 hours  cholecalciferol 1000 Unit(s) Oral daily  enoxaparin Injectable 40 milliGRAM(s) SubCutaneous daily  gemfibrozil 600 milliGRAM(s) Oral two times a day  metroNIDAZOLE  IVPB 500 milliGRAM(s) IV Intermittent every 8 hours  metroNIDAZOLE  IVPB      montelukast 10 milliGRAM(s) Oral every 24 hours  ondansetron Injectable 4 milliGRAM(s) IV Push once  pantoprazole  Injectable 40 milliGRAM(s) IV Push two times a day  zinc sulfate 220 milliGRAM(s) Oral daily    MEDICATIONS  (PRN):  ALBUTerol    90 MICROgram(s) HFA Inhaler 2 Puff(s) Inhalation every 6 hours PRN Shortness of Breath  guaiFENesin   Syrup  (Sugar-Free) 100 milliGRAM(s) Oral every 6 hours PRN Cough  morphine  - Injectable 2 milliGRAM(s) IV Push every 6 hours PRN Moderate Pain (4 - 6)  morphine  - Injectable 4 milliGRAM(s) IV Push every 6 hours PRN Severe Pain (7 - 10)  ondansetron Injectable 4 milliGRAM(s) IV Push every 8 hours PRN Nausea and/or Vomiting Haemophilus influenzae pneumonia

## 2020-12-23 NOTE — PROGRESS NOTE ADULT - ASSESSMENT
1. Colitis improving  2. Hematochezia stopped   3. Hematemesis stopped   4. COVID-19 positive  5. Elevated LFT's  improving    Suggestions:    1. Monitor H/H  2. Transfuse PRBC as needed  3. Avoid NSAID  4. Protonix 40mg daily  5. Follow up LFT's  6. DVT prophylaxis

## 2020-12-23 NOTE — PROGRESS NOTE ADULT - PROBLEM SELECTOR PLAN 1
CT chest showed nodular densities in both lungs and some are ground glass in appearance concerning for pneumonia/pneumonitis  --Follow-up chest CT may be pursued in 4-6 weeks to ensure resolution.  saturating 96% room air  continue symptomatic treatment  monitor oxygen saturation, supplemental oxygen as needed   start on IV dexamethasone 6 mg daily and Remdesivir if saturation fall to 93 %  continue with montelukast and bronchodilators   c/o Vitamin D, Zinc and Vitamin C   ID on board Dr Perea CT chest showed nodular densities in both lungs and some are ground glass in appearance concerning for pneumonia/pneumonitis  --Follow-up chest CT may be pursued in 4-6 weeks to ensure resolution.  saturating 96% room air  continue symptomatic treatment  monitor oxygen saturation, supplemental oxygen as needed   start on IV dexamethasone 6 mg daily and Remdesivir if saturation fall to 93 %  continue with montelukast and bronchodilators   c/o Vitamin D, Zinc and Vitamin C   ID on board Dr Eliazar PIZARRO <150  LFTs trending up but abd pain improved   monitor LFTs

## 2020-12-23 NOTE — PROGRESS NOTE ADULT - ASSESSMENT
29 y/o   male who  tested positive for covid  on 12/10/20 presented  with shortness of breath and blood in stool. Patient was seen in the ED on 12/17  and  discharged. Pt return to the ED with abdominal pain,   diarrhea and blood in stools. On admission pt was positive for  COVID, Troponin is negative. EKG NSR   CT scan of abd showed nonspecific colitis.   CT chest showed nodular densities in both lungs and some are groundglass in appearance concerning for pneumonia/pneumonitis.   Started on Ceftriaxone and Flagyl   Pt seen at bedside, states  abdominal is better, still mild crampy pain but states feels hungry and wants to try solid food. Denies vomiting since yesterday morning. NO diarrhea.  Also complaints of body aches and persistent  headache/ pressure in ears improved with IV tylenol   Saturating 97 % on RA. No difficulties breathing. No fever or leucocytosis

## 2020-12-23 NOTE — DIETITIAN INITIAL EVALUATION ADULT. - PERTINENT LABORATORY DATA
12-23 Na139 mmol/L Glu 102 mg/dL<H> K+ 3.7 mmol/L Cr  1.20 mg/dL BUN 11 mg/dL   12-23 Phos 2.9 mg/dL   12-23 Alb 3.7 g/dL       12-18 Chol 174 mg/dL LDL --    HDL 28 mg/dL<L> Trig 340 mg/dL<H>  12-18-20 @ 09:22 HgbA1C 6.3 [4.0 - 5.6]

## 2020-12-23 NOTE — PROGRESS NOTE ADULT - PROBLEM SELECTOR PLAN 2
CT abdomen + nonspecific colitis, symptoms improving   continue Ceftriaxone and Flagyl   advances to solid food yesterday  had 2 diarrhea since last night but no bloody stool   no leukocytosis, afebrile   continue antiemetic and analgesics as needed   Dr Perea ID follows  Dr Murray on board

## 2020-12-23 NOTE — PROGRESS NOTE ADULT - SUBJECTIVE AND OBJECTIVE BOX
NP Note discussed with  Primary Attending    Patient is a 30y old  Male who presents with a chief complaint of Covid infection (22 Dec 2020 19:37)      INTERVAL HPI/OVERNIGHT EVENTS: no new complaints    MEDICATIONS  (STANDING):  ascorbic acid 1000 milliGRAM(s) Oral daily  cefTRIAXone   IVPB 1000 milliGRAM(s) IV Intermittent every 24 hours  cholecalciferol 1000 Unit(s) Oral daily  enoxaparin Injectable 40 milliGRAM(s) SubCutaneous daily  gemfibrozil 600 milliGRAM(s) Oral two times a day  metroNIDAZOLE  IVPB 500 milliGRAM(s) IV Intermittent every 8 hours  metroNIDAZOLE  IVPB      montelukast 10 milliGRAM(s) Oral every 24 hours  pantoprazole  Injectable 40 milliGRAM(s) IV Push two times a day  zinc sulfate 220 milliGRAM(s) Oral daily    MEDICATIONS  (PRN):  ALBUTerol    90 MICROgram(s) HFA Inhaler 2 Puff(s) Inhalation every 6 hours PRN Shortness of Breath  guaiFENesin   Syrup  (Sugar-Free) 100 milliGRAM(s) Oral every 6 hours PRN Cough  morphine  - Injectable 2 milliGRAM(s) IV Push every 6 hours PRN Moderate Pain (4 - 6)  morphine  - Injectable 4 milliGRAM(s) IV Push every 6 hours PRN Severe Pain (7 - 10)      __________________________________________________  REVIEW OF SYSTEMS:    CONSTITUTIONAL: No fever,   EYES: no acute visual disturbances  NECK: No pain or stiffness  RESPIRATORY: No cough; No shortness of breath  CARDIOVASCULAR: No chest pain, no palpitations  GASTROINTESTINAL:  2 diarrhea overnight, +nauseous, no vomiting, mid abdominal pain.   NEUROLOGICAL: No headache or numbness, no tremors  MUSCULOSKELETAL: No joint pain, no muscle pain  GENITOURINARY: no dysuria, no frequency, no hesitancy  PSYCHIATRY: no depression , no anxiety  ALL OTHER  ROS negative        Vital Signs Last 24 Hrs  T(C): 36.8 (23 Dec 2020 05:20), Max: 36.8 (23 Dec 2020 05:20)  T(F): 98.2 (23 Dec 2020 05:20), Max: 98.2 (23 Dec 2020 05:20)  HR: 78 (23 Dec 2020 05:20) (64 - 87)  BP: 122/65 (23 Dec 2020 05:20) (110/60 - 122/65)  BP(mean): --  RR: 16 (23 Dec 2020 05:20) (14 - 18)  SpO2: 92% (23 Dec 2020 05:20) (92% - 97%)    ________________________________________________  PHYSICAL EXAM:  GENERAL: NAD  HEENT: Normocephalic;  conjunctivae and sclerae clear; moist mucous membranes;   NECK : supple  CHEST/LUNG: Clear to auscultation bilaterally with good air entry   HEART: S1 S2  regular; no murmurs, gallops or rubs  ABDOMEN: Soft, Nondistended; Bowel sounds present, +tenderness to mid abdomen   EXTREMITIES: no cyanosis; no edema; no calf tenderness  SKIN: warm and dry; no rash  NERVOUS SYSTEM:  Awake and alert; Oriented  to place, person and time ; no new deficits    _________________________________________________  LABS:                        14.9   7.78  )-----------( 320      ( 23 Dec 2020 07:46 )             44.8     12-22    139  |  104  |  9   ----------------------------<  102<H>  3.9   |  26  |  1.20    Ca    9.9      22 Dec 2020 08:46    TPro  7.6  /  Alb  3.8  /  TBili  0.7  /  DBili  x   /  AST  92<H>  /  ALT  149<H>  /  AlkPhos  80  12-22        CAPILLARY BLOOD GLUCOSE            RADIOLOGY & ADDITIONAL TESTS:  < from: CT Abdomen and Pelvis w/ Oral Cont and w/ IV Cont (12.18.20 @ 19:09) >  EXAM:  CT ABDOMEN AND PELVIS OC IC                          EXAM:  CT CHEST OC IC                            PROCEDURE DATE:  12/18/2020          INTERPRETATION:  CT of the chest, abdomen, and pelvis with IV and oral contrast    INDICATION: Chest pain. Hematemesis. Hematochezia.    TECHNIQUE: Axial multidetector CT images of the chest, abdomen, and pelvis are acquired following the administration of oral contrast and IV contrast (90 cc of Omnipaque-350 administered, 10 cc discarded)    COMPARISON: Abdominal/pelvic CT dated 10/12/2011.    FINDINGS:    CHEST: No pleural or pericardial effusion. The heart is not enlarged. No evidence for aortic dissection, or aneurysm. No enlarged mediastinal, hilar, or axillary lymph node. The trachea and central bronchi are patent.    No evidence of pneumothorax. Nodular densities in both lungs and some are groundglass in appearance. Findings may represent pneumonia/pneumonitis.  Clinical correlation is recommended.    Abdomen/pelvis: New hepatic steatosis. The gallbladder, common bile duct, pancreas, adrenals and kidneys appear unremarkable. No evidence for a ureteral calculus. No hydronephrosis.    Splenomegaly measuring 16.0 cm.    The appendix appears unremarkable. No bowel obstruction. Apparent segmental mural thickening of the ascending colon may be due to underdistention or nonspecific colitis.    No evidence of free air, ascites, or enlarged lymph node.    Distended urinary bladder which is otherwise unremarkable. The prostate appears grossly unremarkable.    IMPRESSION:    Nodular densities in both lungs and some are groundglass in appearance. Findings may represent pneumonia/pneumonitis.  Clinical correlation is recommended. Follow-up chest CT may be pursued in 4-6 weeks to ensure resolution.    New hepatic steatosis.    Splenomegaly.    Apparent segmental mural thickening of the ascending colon may be due to underdistention or nonspecific colitis. Clinical correlation is recommended.              TRISH ALVARES MD; Attending Radiologist  This document has been electronically signed. Dec 18 2020  7:40PM    < end of copied text >    Imaging Personally Reviewed:  YES/NO    Consultant(s) Notes Reviewed:   YES/ No    Care Discussed with Consultants :     Plan of care was discussed with patient and /or primary care giver; all questions and concerns were addressed and care was aligned with patient's wishes.

## 2020-12-23 NOTE — PROGRESS NOTE ADULT - PROBLEM SELECTOR PLAN 4
AO X 4  full code  he couldn't sleep last night so cannot discuss GOC at this time, but continue full code

## 2020-12-23 NOTE — PROGRESS NOTE ADULT - ASSESSMENT
29 YO pt who works in a [employer], was tested positive for covid 19 on 12/10/20 is presenting with shortness of breath and blood in stool. Patient was seen in the ED on friday with same symptoms and was discharged ,He is coming back with hematemesis, diarrhea and blood in stools,chest pain and shortness of breath,COVID+ pneumonia.  1.Echocardiogram as outpatient.  2.Prediabetes-diet.  3.Lipid d/o-lopid 600mg bid.  4.Abdominal pain-GI f/u.  5.COVID+zinc,singulair,ID f/u..  6.GI and DVT prophylaxis.

## 2020-12-23 NOTE — PROGRESS NOTE ADULT - SUBJECTIVE AND OBJECTIVE BOX
CHIEF COMPLAINT:Patient is a 30y old  Male who presents with a chief complaint of Covid infection.Pt appears comfortable.    	  REVIEW OF SYSTEMS:  CONSTITUTIONAL: No fever, weight loss, or fatigue  EYES: No eye pain, visual disturbances, or discharge  ENT:  No difficulty hearing, tinnitus, vertigo; No sinus or throat pain  NECK: No pain or stiffness  RESPIRATORY: No cough, wheezing, chills or hemoptysis; No Shortness of Breath  CARDIOVASCULAR: No chest pain, palpitations, passing out, dizziness, or leg swelling  GASTROINTESTINAL: No abdominal or epigastric pain. No nausea, vomiting, or hematemesis; No diarrhea or constipation. No melena or hematochezia.  GENITOURINARY: No dysuria, frequency, hematuria, or incontinence  NEUROLOGICAL: No headaches, memory loss, loss of strength, numbness, or tremors  SKIN: No itching, burning, rashes, or lesions   LYMPH Nodes: No enlarged glands  ENDOCRINE: No heat or cold intolerance; No hair loss  MUSCULOSKELETAL: No joint pain or swelling; No muscle, back, or extremity pain  PSYCHIATRIC: No depression, anxiety, mood swings, or difficulty sleeping  HEME/LYMPH: No easy bruising, or bleeding gums  ALLERGY AND IMMUNOLOGIC: No hives or eczema	        PHYSICAL EXAM:  T(C): 36.8 (12-23-20 @ 05:20), Max: 36.8 (12-23-20 @ 05:20)  HR: 78 (12-23-20 @ 05:20) (64 - 87)  BP: 122/65 (12-23-20 @ 05:20) (110/60 - 122/65)  RR: 16 (12-23-20 @ 05:20) (14 - 18)  SpO2: 92% (12-23-20 @ 05:20) (92% - 97%)        Appearance: Normal	  HEENT:   Normal oral mucosa, PERRL, EOMI	  Lymphatic: No lymphadenopathy  Cardiovascular: Normal S1 S2, No JVD, No murmurs, No edema  Respiratory: Lungs clear to auscultation	  Psychiatry: A & O x 3, Mood & affect appropriate  Gastrointestinal:  Soft, Non-tender, + BS	  Skin: No rashes, No ecchymoses, No cyanosis	  Neurologic: Non-focal  Extremities: Normal range of motion, No clubbing, cyanosis or edema  Vascular: Peripheral pulses palpable 2+ bilaterally    MEDICATIONS  (STANDING):  ascorbic acid 1000 milliGRAM(s) Oral daily  cefTRIAXone   IVPB 1000 milliGRAM(s) IV Intermittent every 24 hours  cholecalciferol 1000 Unit(s) Oral daily  enoxaparin Injectable 40 milliGRAM(s) SubCutaneous daily  gemfibrozil 600 milliGRAM(s) Oral two times a day  metroNIDAZOLE  IVPB 500 milliGRAM(s) IV Intermittent every 8 hours  metroNIDAZOLE  IVPB      montelukast 10 milliGRAM(s) Oral every 24 hours  pantoprazole  Injectable 40 milliGRAM(s) IV Push two times a day  zinc sulfate 220 milliGRAM(s) Oral daily      	  	  LABS:	 	                       14.9   7.78  )-----------( 320      ( 23 Dec 2020 07:46 )             44.8     12-23    139  |  104  |  11  ----------------------------<  102<H>  3.7   |  24  |  1.20    Ca    10.0      23 Dec 2020 07:46  Phos  2.9     12-23  Mg     2.2     12-23    TPro  7.7  /  Alb  3.7  /  TBili  0.7  /  DBili  x   /  AST  73<H>  /  ALT  152<H>  /  AlkPhos  80  12-23    proBNP:   Lipid Profile: Cholesterol 174  LDL --  HDL 28      HgA1c:   TSH: Thyroid Stimulating Hormone, Serum: 1.64 uU/mL (12-18 @ 06:05)

## 2020-12-23 NOTE — PROGRESS NOTE ADULT - SUBJECTIVE AND OBJECTIVE BOX
Patient is seen and examined at the bed side, is afebrile. He is still having intermittent nausea, but improving.       REVIEW OF SYSTEMS: All other review systems are negative      ALLERGIES: No Known Allergies      PHYSICAL EXAMINATION:   Vital Signs Last 24 Hrs  T(C): 36.2 (23 Dec 2020 14:55), Max: 36.8 (23 Dec 2020 05:20)  T(F): 97.2 (23 Dec 2020 14:55), Max: 98.2 (23 Dec 2020 05:20)  HR: 89 (23 Dec 2020 14:55) (78 - 89)  BP: 126/77 (23 Dec 2020 14:55) (120/78 - 126/77)  BP(mean): --  RR: 18 (23 Dec 2020 14:55) (14 - 18)  SpO2: 97% (23 Dec 2020 14:55) (92% - 97%)        LABS:                        14.9   7.78  )-----------( 320      ( 23 Dec 2020 07:46 )             44.8                           14.4   6.06  )-----------( 324      ( 22 Dec 2020 08:46 )             42.4                12-23    139  |  104  |  11  ----------------------------<  102<H>  3.7   |  24  |  1.20    Ca    10.0      23 Dec 2020 07:46  Phos  2.9     12-23  Mg     2.2     12-23    TPro  7.7  /  Alb  3.7  /  TBili  0.7  /  DBili  x   /  AST  73<H>  /  ALT  152<H>  /  AlkPhos  80  12-23 12-22    139  |  104  |  9   ----------------------------<  102<H>  3.9   |  26  |  1.20    Ca    9.9      22 Dec 2020 08:46    TPro  7.6  /  Alb  3.8  /  TBili  0.7  /  DBili  x   /  AST  92<H>  /  ALT  149<H>  /  AlkPhos  80  12-22    TPro  7.9  /  Alb  3.6  /  TBili  0.9  /  DBili  x   /  AST  49<H>  /  ALT  103<H>  /  AlkPhos  84  12-20        MEDICATIONS  (STANDING):    ascorbic acid 1000 milliGRAM(s) Oral daily  cefTRIAXone   IVPB 1000 milliGRAM(s) IV Intermittent every 24 hours  cholecalciferol 1000 Unit(s) Oral daily  enoxaparin Injectable 40 milliGRAM(s) SubCutaneous daily  gemfibrozil 600 milliGRAM(s) Oral two times a day  metroNIDAZOLE  IVPB 500 milliGRAM(s) IV Intermittent every 8 hours  metroNIDAZOLE  IVPB      montelukast 10 milliGRAM(s) Oral every 24 hours  pantoprazole  Injectable 40 milliGRAM(s) IV Push two times a day  zinc sulfate 220 milliGRAM(s) Oral daily        RADIOLOGY & ADDITIONAL TESTS:    12/18/20: CT Abd/pelvis and Chest w/ Oral Cont and w/ IV Cont (12.18.20 @ 19:08) Nodular densities in both lungs and some are ground glass in appearance. Findings may represent pneumonia/pneumonitis.  Clinical correlation is recommended. Follow-up chest CT may be pursued in 4-6 weeks to ensure resolution.  New hepatic steatosis.   Splenomegaly.   Apparent segmental mural thickening of the ascending colon may be due to underdistention or nonspecific colitis. Clinical correlation is recommended.      12/17/20 : Xray Chest 1 View- PORTABLE-Urgent (12.17.20 @ 14:41) The lung fields and pleural  surfaces are unremarkable.        MICROBIOLOGY DATA:    MRSA/MSSA PCR (12.20.20 @ 09:20)   MRSA PCR Result.: NotDetec    C-Reactive Protein, Serum (12.18.20 @ 09:14)   C-Reactive Protein, Serum: 0.33 mg/dL Ferritin, Serum (12.18.20 @ 09:12)   Ferritin, Serum: 783 ng/mL     D-Dimer Assay, Quantitative (12.17.20 @ 14:29)  <150 ng/mL DDU   Respiratory Viral Panel with COVID-19 by JERRY (12.17.20 @ 15:11)   Rapid RVP Result: Detected   SARS-CoV-2: Detected:                  Patient is seen and examined at the bed side, is afebrile. He is feeling much better.       REVIEW OF SYSTEMS: All other review systems are negative      ALLERGIES: No Known Allergies      PHYSICAL EXAMINATION:   Vital Signs Last 24 Hrs  T(C): 36.2 (23 Dec 2020 14:55), Max: 36.8 (23 Dec 2020 05:20)  T(F): 97.2 (23 Dec 2020 14:55), Max: 98.2 (23 Dec 2020 05:20)  HR: 89 (23 Dec 2020 14:55) (78 - 89)  BP: 126/77 (23 Dec 2020 14:55) (120/78 - 126/77)  BP(mean): --  RR: 18 (23 Dec 2020 14:55) (14 - 18)  SpO2: 97% (23 Dec 2020 14:55) (92% - 97%)        LABS:                        14.9   7.78  )-----------( 320      ( 23 Dec 2020 07:46 )             44.8                           14.4   6.06  )-----------( 324      ( 22 Dec 2020 08:46 )             42.4                12-23    139  |  104  |  11  ----------------------------<  102<H>  3.7   |  24  |  1.20    Ca    10.0      23 Dec 2020 07:46  Phos  2.9     12-23  Mg     2.2     12-23    TPro  7.7  /  Alb  3.7  /  TBili  0.7  /  DBili  x   /  AST  73<H>  /  ALT  152<H>  /  AlkPhos  80  12-23 12-22    139  |  104  |  9   ----------------------------<  102<H>  3.9   |  26  |  1.20    Ca    9.9      22 Dec 2020 08:46    TPro  7.6  /  Alb  3.8  /  TBili  0.7  /  DBili  x   /  AST  92<H>  /  ALT  149<H>  /  AlkPhos  80  12-22    TPro  7.9  /  Alb  3.6  /  TBili  0.9  /  DBili  x   /  AST  49<H>  /  ALT  103<H>  /  AlkPhos  84  12-20        MEDICATIONS  (STANDING):    ascorbic acid 1000 milliGRAM(s) Oral daily  cefTRIAXone   IVPB 1000 milliGRAM(s) IV Intermittent every 24 hours  cholecalciferol 1000 Unit(s) Oral daily  enoxaparin Injectable 40 milliGRAM(s) SubCutaneous daily  gemfibrozil 600 milliGRAM(s) Oral two times a day  metroNIDAZOLE  IVPB 500 milliGRAM(s) IV Intermittent every 8 hours  metroNIDAZOLE  IVPB      montelukast 10 milliGRAM(s) Oral every 24 hours  pantoprazole  Injectable 40 milliGRAM(s) IV Push two times a day  zinc sulfate 220 milliGRAM(s) Oral daily        RADIOLOGY & ADDITIONAL TESTS:    12/18/20: CT Abd/pelvis and Chest w/ Oral Cont and w/ IV Cont (12.18.20 @ 19:08) Nodular densities in both lungs and some are ground glass in appearance. Findings may represent pneumonia/pneumonitis.  Clinical correlation is recommended. Follow-up chest CT may be pursued in 4-6 weeks to ensure resolution.  New hepatic steatosis.   Splenomegaly.   Apparent segmental mural thickening of the ascending colon may be due to underdistention or nonspecific colitis. Clinical correlation is recommended.      12/17/20 : Xray Chest 1 View- PORTABLE-Urgent (12.17.20 @ 14:41) The lung fields and pleural  surfaces are unremarkable.        MICROBIOLOGY DATA:    MRSA/MSSA PCR (12.20.20 @ 09:20)   MRSA PCR Result.: NotDetec    C-Reactive Protein, Serum (12.18.20 @ 09:14)   C-Reactive Protein, Serum: 0.33 mg/dL Ferritin, Serum (12.18.20 @ 09:12)   Ferritin, Serum: 783 ng/mL     D-Dimer Assay, Quantitative (12.17.20 @ 14:29)  <150 ng/mL DDU   Respiratory Viral Panel with COVID-19 by JERRY (12.17.20 @ 15:11)   Rapid RVP Result: Detected   SARS-CoV-2: Detected:

## 2020-12-23 NOTE — PROGRESS NOTE ADULT - SUBJECTIVE AND OBJECTIVE BOX
Time of Visit:  Patient seen and examined.     MEDICATIONS  (STANDING):  ascorbic acid 1000 milliGRAM(s) Oral daily  cefTRIAXone   IVPB 1000 milliGRAM(s) IV Intermittent every 24 hours  cholecalciferol 1000 Unit(s) Oral daily  enoxaparin Injectable 40 milliGRAM(s) SubCutaneous daily  gemfibrozil 600 milliGRAM(s) Oral two times a day  hydrocodone/homatropine Syrup 5 milliLiter(s) Oral once  metroNIDAZOLE  IVPB      metroNIDAZOLE  IVPB 500 milliGRAM(s) IV Intermittent every 8 hours  pantoprazole  Injectable 40 milliGRAM(s) IV Push two times a day  zinc sulfate 220 milliGRAM(s) Oral daily      MEDICATIONS  (PRN):  ALBUTerol    90 MICROgram(s) HFA Inhaler 2 Puff(s) Inhalation every 6 hours PRN Shortness of Breath  hydrocodone/homatropine Syrup 5 milliLiter(s) Oral every 4 hours PRN Cough  morphine  - Injectable 1 milliGRAM(s) IV Push every 6 hours PRN Moderate Pain (4 - 6)  morphine  - Injectable 2 milliGRAM(s) IV Push every 6 hours PRN Severe Pain (7 - 10)  ondansetron Injectable 4 milliGRAM(s) IV Push every 8 hours PRN Nausea and/or Vomiting       Medications up to date at time of exam.      PHYSICAL EXAMINATION:  Patient has no new complaints.  GENERAL: The patient is a well-developed, well-nourished, in no apparent distress.     Vital Signs Last 24 Hrs  T(C): 36.3 (23 Dec 2020 20:58), Max: 36.8 (23 Dec 2020 05:20)  T(F): 97.3 (23 Dec 2020 20:58), Max: 98.2 (23 Dec 2020 05:20)  HR: 86 (23 Dec 2020 20:58) (78 - 89)  BP: 113/77 (23 Dec 2020 20:58) (113/77 - 126/77)  BP(mean): --  RR: 16 (23 Dec 2020 20:58) (16 - 18)  SpO2: 95% (23 Dec 2020 20:58) (92% - 97%)   (if applicable)    Chest Tube (if applicable)    HEENT: Head is normocephalic and atraumatic. Extraocular muscles are intact. Mucous membranes are moist.     NECK: Supple, no palpable adenopathy.    LUNGS: Clear to auscultation, no wheezing, rales, or rhonchi.    HEART: Regular rate and rhythm without murmur.    ABDOMEN: Soft, nontender, and nondistended.  No hepatosplenomegaly is noted.    : No painful voiding, no pelvic pain    EXTREMITIES: Without any cyanosis, clubbing, rash, lesions or edema.    NEUROLOGIC: Awake, alert, oriented, grossly intact    SKIN: Warm, dry, good turgor.      LABS:                        14.9   7.78  )-----------( 320      ( 23 Dec 2020 07:46 )             44.8     12-23    139  |  104  |  11  ----------------------------<  102<H>  3.7   |  24  |  1.20    Ca    10.0      23 Dec 2020 07:46  Phos  2.9     12-23  Mg     2.2     12-23    TPro  7.7  /  Alb  3.7  /  TBili  0.7  /  DBili  x   /  AST  73<H>  /  ALT  152<H>  /  AlkPhos  80  12-23                        MICROBIOLOGY: (if applicable)    RADIOLOGY & ADDITIONAL STUDIES:  EKG:   CXR:  ECHO:    IMPRESSION: 30y Male PAST MEDICAL & SURGICAL HISTORY:  Colonic polyp    No pertinent past medical history     p/w       IMP: This is a 30 yrs old man who works in a [employer], was tested positive for covid 19 on 12/10/20 is presenting with shortness of breath and blood in stool.  Pat is tolerating room air .  Low biomarkers    - COVID-19 pna  - colitis   - Pre-DM  - Obesity        Plan  -continue isolation   -monitor oximetry  -continue antibx  -no need for decadron or Remdesivir   -DVT/GI prophy  -out pat pulmonary f/u  -hycodan for cough  -d/c singulair and robitussin

## 2020-12-23 NOTE — DIETITIAN INITIAL EVALUATION ADULT. - OTHER INFO
Pt on Air borne isolation. Pt with COVID +. D/W Pt via  phone . Pt Reports Good appetite at present. NO Diarrhea / vomiting Reported. Pt is On ensure enlive . Offered Ensure clear but Pt Refused. Pt requesting to continue with Ensure enlive. No weight loss Reported. Per Pt HT 6 feet 2 inches,  LB. BMI 30. GI consult Noted. Pt denies H/O DM ( A1c 6.3). D/W RN. Pt with  Fair appetite. C/O Nausea.. On zofran,

## 2020-12-23 NOTE — PROGRESS NOTE ADULT - SUBJECTIVE AND OBJECTIVE BOX
[   ] ICU                                          [   ] CCU                                      [ X  ] Medical Floor      30 year old male who works in a [employer], was tested positive for COVID-19 on 12/10/20 is presenting with shortness of breath and blood in stool. Patient was seen in the emergency room on Friday with same symptoms and was discharged ,He is coming back with hematemesis, diarrhea and blood in stools. Patient endorses severe chest pain that is parasternal localized increases with eating. Patient also endorses vomiting whenever he tries to eat and loss of appetite. Patient reports that he was saturating around 85% at home, however, in the hospital he is saturating 95% on RA and not in Respiratory distress.     Today patient appears comfortable and no new complaints reported, No abdominal pain, N/V, hematemesis, hematochezia, melena, fever, chills, chest pain, SOB, cough or diarrhea reported.       PAIN MANAGEMENT:  Pain Scale:                0 /10  Pain Location:      Prior Colonoscopy:  Unknown    PAST MEDICAL HISTORY  Colonic polyp       PAST SURGICAL HISTORY  No significant surgical history      Allergies    No Known Allergies    Intolerances  None    SOCIAL HISTORY  Advanced Directives:       [ X ] Full Code       [  ] DNR  Marital Status:         [  ] M      [ X ] S      [  ] D       [  ] W  Children:       [ X ] Yes      [  ] No  Occupation:        [  ] Employed       [ X ] Unemployed       [  ] Retired  Diet:       [ X ] Regular       [  ] PEG feeding          [  ] NG tube feeding  Drug Use:           [ X ] Patient denied          [  ] Yes  Alcohol:           [X  ] No             [  ] Yes (socially)         [  ] Yes (chronic)  Tobacco:           [  ] Yes           [ X ] No    FAMILY HISTORY  [X  ] Heart Disease            [X  ] Diabetes             [ X ] HTN             [  ] Colon Cancer             [  ] Stomach Cancer              [  ] Pancreatic Cancer        VITALS  Vital Signs Last 24 Hrs  T(C): 36.8 (23 Dec 2020 05:20), Max: 36.8 (23 Dec 2020 05:20)  T(F): 98.2 (23 Dec 2020 05:20), Max: 98.2 (23 Dec 2020 05:20)  HR: 78 (23 Dec 2020 05:20) (64 - 87)  BP: 122/65 (23 Dec 2020 05:20) (110/60 - 122/65)   RR: 16 (23 Dec 2020 05:20) (14 - 18)  SpO2: 92% (23 Dec 2020 05:20) (92% - 97%)       MEDICATIONS  (STANDING):  ascorbic acid 1000 milliGRAM(s) Oral daily  cefTRIAXone   IVPB 1000 milliGRAM(s) IV Intermittent every 24 hours  cholecalciferol 1000 Unit(s) Oral daily  enoxaparin Injectable 40 milliGRAM(s) SubCutaneous daily  gemfibrozil 600 milliGRAM(s) Oral two times a day  metroNIDAZOLE  IVPB 500 milliGRAM(s) IV Intermittent every 8 hours  metroNIDAZOLE  IVPB      montelukast 10 milliGRAM(s) Oral every 24 hours  ondansetron Injectable 4 milliGRAM(s) IV Push once  pantoprazole  Injectable 40 milliGRAM(s) IV Push two times a day  zinc sulfate 220 milliGRAM(s) Oral daily    MEDICATIONS  (PRN):  ALBUTerol    90 MICROgram(s) HFA Inhaler 2 Puff(s) Inhalation every 6 hours PRN Shortness of Breath  guaiFENesin   Syrup  (Sugar-Free) 100 milliGRAM(s) Oral every 6 hours PRN Cough  morphine  - Injectable 2 milliGRAM(s) IV Push every 6 hours PRN Moderate Pain (4 - 6)  morphine  - Injectable 4 milliGRAM(s) IV Push every 6 hours PRN Severe Pain (7 - 10)  ondansetron Injectable 4 milliGRAM(s) IV Push every 8 hours PRN Nausea and/or Vomiting                            14.9   7.78  )-----------( 320      ( 23 Dec 2020 07:46 )             44.8       12-23    139  |  104  |  11  ----------------------------<  102<H>  3.7   |  24  |  1.20    Ca    10.0      23 Dec 2020 07:46  Phos  2.9     12-23  Mg     2.2     12-23    TPro  7.7  /  Alb  3.7  /  TBili  0.7  /  DBili  x   /  AST  73<H>  /  ALT  152<H>  /  AlkPhos  80  12-23

## 2020-12-23 NOTE — PROGRESS NOTE ADULT - ASSESSMENT
Patient is a 30y old  Male who works in a [employer], was tested positive for covid 19 on 12/10/20 and now presenting with shortness of breath and blood in stool. Patient was seen in the ED on Friday with same symptoms and was discharged ,He is coming back with hematemesis, diarrhea and blood in stools. Patient endorses severe chest pain that is parasternal localized , increases with eating. Patient also endorses vomiting whenever he tries to eat and loss of appetite. Patient reports that he was saturating around 85% at home, however, in the hospital he is saturating 95% on RA and not in Respiratory distress.  Off note patient has history of constipation and was found to have polyp few years ago, On admission, he found to have no fever, no leukocytosis but tachycardia and positive COVID test. The CXR and Troponin is negative. The ID consult requested to assist with further management.     # COVID infection- currently saturating well at Room air, No indication of Dexamethasone OR Remdesivir   # Nonspecific Colitis OR underdistention  ON CT abd/pelvis shows   Apparent segmental mural thickening of the ascending colon may be due to underdistention or nonspecific colitis.     would recommend:    1. OOB to chair   2.  Continue Ceftriaxone and Flagyl X total of 7 days   3. Continue Supportive care, antiemetic agents    4. Monitor oxygen saturation closely and start on IV dexamethasone 6 mg daily and Remdesivir if saturation fall to 93 % or less  5. Continue Bronchodilator as needed  6. COVID precautions     d/w Covering N\P, EVA    Attending Attestation:    Spent more than 45 minutes on total encounter, more than 50 % of the visit was spent counseling and/or coordinating care by the Attending physician. Patient is a 30y old  Male who works in a [employer], was tested positive for covid 19 on 12/10/20 and now presenting with shortness of breath and blood in stool. Patient was seen in the ED on Friday with same symptoms and was discharged ,He is coming back with hematemesis, diarrhea and blood in stools. Patient endorses severe chest pain that is parasternal localized , increases with eating. Patient also endorses vomiting whenever he tries to eat and loss of appetite. Patient reports that he was saturating around 85% at home, however, in the hospital he is saturating 95% on RA and not in Respiratory distress.  Off note patient has history of constipation and was found to have polyp few years ago, On admission, he found to have no fever, no leukocytosis but tachycardia and positive COVID test. The CXR and Troponin is negative. The ID consult requested to assist with further management.     # COVID infection- currently saturating well at Room air, No indication of Dexamethasone OR Remdesivir   # Nonspecific Colitis OR underdistention  ON CT abd/pelvis shows   Apparent segmental mural thickening of the ascending colon may be due to underdistention or nonspecific colitis.     would recommend:    1. Continue  supportive care  2. Continue Ceftriaxone and Flagyl inpatient and may change to oral Augmentin 875 mg q 12hours on discharge to continue until 12/25/20  3. Monitor oxygen saturation closely and  Bronchodilator as needed  4. COVID precautions   5. OOB to chair    d/w Covering EVA OLIVIA    Attending Attestation:    Spent more than 45 minutes on total encounter, more than 50 % of the visit was spent counseling and/or coordinating care by the Attending physician.

## 2020-12-24 DIAGNOSIS — U07.1 COVID-19: ICD-10-CM

## 2020-12-24 LAB
ALBUMIN SERPL ELPH-MCNC: 3.7 G/DL — SIGNIFICANT CHANGE UP (ref 3.5–5)
ALP SERPL-CCNC: 77 U/L — SIGNIFICANT CHANGE UP (ref 40–120)
ALT FLD-CCNC: 154 U/L DA — HIGH (ref 10–60)
ANION GAP SERPL CALC-SCNC: 9 MMOL/L — SIGNIFICANT CHANGE UP (ref 5–17)
AST SERPL-CCNC: 66 U/L — HIGH (ref 10–40)
BASOPHILS # BLD AUTO: 0.04 K/UL — SIGNIFICANT CHANGE UP (ref 0–0.2)
BASOPHILS NFR BLD AUTO: 0.5 % — SIGNIFICANT CHANGE UP (ref 0–2)
BILIRUB SERPL-MCNC: 0.5 MG/DL — SIGNIFICANT CHANGE UP (ref 0.2–1.2)
BUN SERPL-MCNC: 14 MG/DL — SIGNIFICANT CHANGE UP (ref 7–18)
CALCIUM SERPL-MCNC: 9.8 MG/DL — SIGNIFICANT CHANGE UP (ref 8.4–10.5)
CHLORIDE SERPL-SCNC: 106 MMOL/L — SIGNIFICANT CHANGE UP (ref 96–108)
CO2 SERPL-SCNC: 25 MMOL/L — SIGNIFICANT CHANGE UP (ref 22–31)
CREAT SERPL-MCNC: 1.1 MG/DL — SIGNIFICANT CHANGE UP (ref 0.5–1.3)
CRP SERPL-MCNC: 0.24 MG/DL — SIGNIFICANT CHANGE UP (ref 0–0.4)
EOSINOPHIL # BLD AUTO: 0.16 K/UL — SIGNIFICANT CHANGE UP (ref 0–0.5)
EOSINOPHIL NFR BLD AUTO: 1.9 % — SIGNIFICANT CHANGE UP (ref 0–6)
GLUCOSE SERPL-MCNC: 104 MG/DL — HIGH (ref 70–99)
HCT VFR BLD CALC: 43.7 % — SIGNIFICANT CHANGE UP (ref 39–50)
HGB BLD-MCNC: 14.8 G/DL — SIGNIFICANT CHANGE UP (ref 13–17)
IMM GRANULOCYTES NFR BLD AUTO: 0.2 % — SIGNIFICANT CHANGE UP (ref 0–1.5)
LYMPHOCYTES # BLD AUTO: 2.58 K/UL — SIGNIFICANT CHANGE UP (ref 1–3.3)
LYMPHOCYTES # BLD AUTO: 31 % — SIGNIFICANT CHANGE UP (ref 13–44)
MCHC RBC-ENTMCNC: 28.6 PG — SIGNIFICANT CHANGE UP (ref 27–34)
MCHC RBC-ENTMCNC: 33.9 GM/DL — SIGNIFICANT CHANGE UP (ref 32–36)
MCV RBC AUTO: 84.5 FL — SIGNIFICANT CHANGE UP (ref 80–100)
MONOCYTES # BLD AUTO: 0.82 K/UL — SIGNIFICANT CHANGE UP (ref 0–0.9)
MONOCYTES NFR BLD AUTO: 9.9 % — SIGNIFICANT CHANGE UP (ref 2–14)
NEUTROPHILS # BLD AUTO: 4.69 K/UL — SIGNIFICANT CHANGE UP (ref 1.8–7.4)
NEUTROPHILS NFR BLD AUTO: 56.5 % — SIGNIFICANT CHANGE UP (ref 43–77)
NRBC # BLD: 0 /100 WBCS — SIGNIFICANT CHANGE UP (ref 0–0)
PLATELET # BLD AUTO: 315 K/UL — SIGNIFICANT CHANGE UP (ref 150–400)
POTASSIUM SERPL-MCNC: 3.8 MMOL/L — SIGNIFICANT CHANGE UP (ref 3.5–5.3)
POTASSIUM SERPL-SCNC: 3.8 MMOL/L — SIGNIFICANT CHANGE UP (ref 3.5–5.3)
PROT SERPL-MCNC: 7.9 G/DL — SIGNIFICANT CHANGE UP (ref 6–8.3)
RBC # BLD: 5.17 M/UL — SIGNIFICANT CHANGE UP (ref 4.2–5.8)
RBC # FLD: 13.1 % — SIGNIFICANT CHANGE UP (ref 10.3–14.5)
SODIUM SERPL-SCNC: 140 MMOL/L — SIGNIFICANT CHANGE UP (ref 135–145)
WBC # BLD: 8.31 K/UL — SIGNIFICANT CHANGE UP (ref 3.8–10.5)
WBC # FLD AUTO: 8.31 K/UL — SIGNIFICANT CHANGE UP (ref 3.8–10.5)

## 2020-12-24 RX ORDER — ALBUTEROL 90 UG/1
2 AEROSOL, METERED ORAL
Qty: 240 | Refills: 0
Start: 2020-12-24 | End: 2021-01-22

## 2020-12-24 RX ORDER — CHOLECALCIFEROL (VITAMIN D3) 125 MCG
1 CAPSULE ORAL
Qty: 30 | Refills: 0
Start: 2020-12-24 | End: 2021-01-22

## 2020-12-24 RX ORDER — DEXAMETHASONE 0.5 MG/5ML
6 ELIXIR ORAL DAILY
Refills: 0 | Status: DISCONTINUED | OUTPATIENT
Start: 2020-12-24 | End: 2020-12-26

## 2020-12-24 RX ORDER — PANTOPRAZOLE SODIUM 20 MG/1
1 TABLET, DELAYED RELEASE ORAL
Qty: 28 | Refills: 0
Start: 2020-12-24 | End: 2021-01-06

## 2020-12-24 RX ORDER — ZINC SULFATE TAB 220 MG (50 MG ZINC EQUIVALENT) 220 (50 ZN) MG
1 TAB ORAL
Qty: 30 | Refills: 0
Start: 2020-12-24 | End: 2021-01-22

## 2020-12-24 RX ORDER — ASCORBIC ACID 60 MG
1 TABLET,CHEWABLE ORAL
Qty: 30 | Refills: 0
Start: 2020-12-24 | End: 2021-01-22

## 2020-12-24 RX ORDER — CHOLECALCIFEROL (VITAMIN D3) 125 MCG
1000 CAPSULE ORAL
Qty: 0 | Refills: 0 | DISCHARGE
Start: 2020-12-24

## 2020-12-24 RX ADMIN — Medication 1000 UNIT(S): at 11:47

## 2020-12-24 RX ADMIN — PANTOPRAZOLE SODIUM 40 MILLIGRAM(S): 20 TABLET, DELAYED RELEASE ORAL at 17:25

## 2020-12-24 RX ADMIN — Medication 600 MILLIGRAM(S): at 17:25

## 2020-12-24 RX ADMIN — Medication 600 MILLIGRAM(S): at 08:55

## 2020-12-24 RX ADMIN — Medication 100 MILLIGRAM(S): at 21:32

## 2020-12-24 RX ADMIN — MORPHINE SULFATE 2 MILLIGRAM(S): 50 CAPSULE, EXTENDED RELEASE ORAL at 17:08

## 2020-12-24 RX ADMIN — Medication 100 MILLIGRAM(S): at 06:23

## 2020-12-24 RX ADMIN — Medication 6 MILLIGRAM(S): at 11:51

## 2020-12-24 RX ADMIN — PANTOPRAZOLE SODIUM 40 MILLIGRAM(S): 20 TABLET, DELAYED RELEASE ORAL at 06:23

## 2020-12-24 RX ADMIN — Medication 1000 MILLIGRAM(S): at 11:47

## 2020-12-24 RX ADMIN — ENOXAPARIN SODIUM 40 MILLIGRAM(S): 100 INJECTION SUBCUTANEOUS at 11:47

## 2020-12-24 RX ADMIN — MORPHINE SULFATE 2 MILLIGRAM(S): 50 CAPSULE, EXTENDED RELEASE ORAL at 08:55

## 2020-12-24 RX ADMIN — ZINC SULFATE TAB 220 MG (50 MG ZINC EQUIVALENT) 220 MILLIGRAM(S): 220 (50 ZN) TAB at 11:47

## 2020-12-24 RX ADMIN — CEFTRIAXONE 100 MILLIGRAM(S): 500 INJECTION, POWDER, FOR SOLUTION INTRAMUSCULAR; INTRAVENOUS at 20:09

## 2020-12-24 RX ADMIN — ONDANSETRON 4 MILLIGRAM(S): 8 TABLET, FILM COATED ORAL at 09:15

## 2020-12-24 RX ADMIN — Medication 100 MILLIGRAM(S): at 15:19

## 2020-12-24 NOTE — PROGRESS NOTE ADULT - SUBJECTIVE AND OBJECTIVE BOX
Patient is seen and examined at the bed side, is afebrile. He is feeling much better.       REVIEW OF SYSTEMS: All other review systems are negative      ALLERGIES: No Known Allergies      Vital Signs Last 24 Hrs  T(C): 36.4 (24 Dec 2020 14:15), Max: 36.4 (24 Dec 2020 14:15)  T(F): 97.6 (24 Dec 2020 14:15), Max: 97.6 (24 Dec 2020 14:15)  HR: 95 (24 Dec 2020 14:15) (71 - 95)  BP: 120/95 (24 Dec 2020 14:15) (109/66 - 120/95)  BP(mean): --  RR: 16 (24 Dec 2020 14:15) (16 - 16)  SpO2: 95% (24 Dec 2020 14:15) (95% - 95%)      LABS:                        14.8   8.31  )-----------( 315      ( 24 Dec 2020 05:57 )             43.7                           14.9   7.78  )-----------( 320      ( 23 Dec 2020 07:46 )             44.8       12-24    140  |  106  |  14  ----------------------------<  104<H>  3.8   |  25  |  1.10    Ca    9.8      24 Dec 2020 05:57  Phos  2.9     12-23  Mg     2.2     12-23    TPro  7.9  /  Alb  3.7  /  TBili  0.5  /  DBili  x   /  AST  66<H>  /  ALT  154<H>  /  AlkPhos  77  12-24    12-23    139  |  104  |  11  ----------------------------<  102<H>  3.7   |  24  |  1.20    Ca    10.0      23 Dec 2020 07:46  Phos  2.9     12-23  Mg     2.2     12-23    TPro  7.7  /  Alb  3.7  /  TBili  0.7  /  DBili  x   /  AST  73<H>  /  ALT  152<H>  /  AlkPhos  80  12-23    TPro  7.9  /  Alb  3.6  /  TBili  0.9  /  DBili  x   /  AST  49<H>  /  ALT  103<H>  /  AlkPhos  84  12-20        MEDICATIONS  (STANDING):    ascorbic acid 1000 milliGRAM(s) Oral daily  cefTRIAXone   IVPB 1000 milliGRAM(s) IV Intermittent every 24 hours  cholecalciferol 1000 Unit(s) Oral daily  dexAMETHasone  Injectable 6 milliGRAM(s) IV Push daily  enoxaparin Injectable 40 milliGRAM(s) SubCutaneous daily  gemfibrozil 600 milliGRAM(s) Oral two times a day  metroNIDAZOLE  IVPB 500 milliGRAM(s) IV Intermittent every 8 hours  metroNIDAZOLE  IVPB      pantoprazole  Injectable 40 milliGRAM(s) IV Push two times a day  zinc sulfate 220 milliGRAM(s) Oral daily          RADIOLOGY & ADDITIONAL TESTS:    12/18/20: CT Abd/pelvis and Chest w/ Oral Cont and w/ IV Cont (12.18.20 @ 19:08) Nodular densities in both lungs and some are ground glass in appearance. Findings may represent pneumonia/pneumonitis.  Clinical correlation is recommended. Follow-up chest CT may be pursued in 4-6 weeks to ensure resolution.  New hepatic steatosis.   Splenomegaly.   Apparent segmental mural thickening of the ascending colon may be due to underdistention or nonspecific colitis. Clinical correlation is recommended.      12/17/20 : Xray Chest 1 View- PORTABLE-Urgent (12.17.20 @ 14:41) The lung fields and pleural  surfaces are unremarkable.        MICROBIOLOGY DATA:    MRSA/MSSA PCR (12.20.20 @ 09:20)   MRSA PCR Result.: NotDetec    C-Reactive Protein, Serum (12.18.20 @ 09:14)   C-Reactive Protein, Serum: 0.33 mg/dL Ferritin, Serum (12.18.20 @ 09:12)   Ferritin, Serum: 783 ng/mL     D-Dimer Assay, Quantitative (12.17.20 @ 14:29)  <150 ng/mL DDU   Respiratory Viral Panel with COVID-19 by JERRY (12.17.20 @ 15:11)   Rapid RVP Result: Detected   SARS-CoV-2: Detected:                Patient is seen and examined at the bed side, is afebrile.  He has started on IV dexamethasone by primary team.      REVIEW OF SYSTEMS: All other review systems are negative      ALLERGIES: No Known Allergies      Vital Signs Last 24 Hrs  T(C): 36.4 (24 Dec 2020 14:15), Max: 36.4 (24 Dec 2020 14:15)  T(F): 97.6 (24 Dec 2020 14:15), Max: 97.6 (24 Dec 2020 14:15)  HR: 95 (24 Dec 2020 14:15) (71 - 95)  BP: 120/95 (24 Dec 2020 14:15) (109/66 - 120/95)  BP(mean): --  RR: 16 (24 Dec 2020 14:15) (16 - 16)  SpO2: 95% (24 Dec 2020 14:15) (95% - 95%)      LABS:                        14.8   8.31  )-----------( 315      ( 24 Dec 2020 05:57 )             43.7                           14.9   7.78  )-----------( 320      ( 23 Dec 2020 07:46 )             44.8       12-24    140  |  106  |  14  ----------------------------<  104<H>  3.8   |  25  |  1.10    Ca    9.8      24 Dec 2020 05:57  Phos  2.9     12-23  Mg     2.2     12-23    TPro  7.9  /  Alb  3.7  /  TBili  0.5  /  DBili  x   /  AST  66<H>  /  ALT  154<H>  /  AlkPhos  77  12-24    12-23    139  |  104  |  11  ----------------------------<  102<H>  3.7   |  24  |  1.20    Ca    10.0      23 Dec 2020 07:46  Phos  2.9     12-23  Mg     2.2     12-23    TPro  7.7  /  Alb  3.7  /  TBili  0.7  /  DBili  x   /  AST  73<H>  /  ALT  152<H>  /  AlkPhos  80  12-23    TPro  7.9  /  Alb  3.6  /  TBili  0.9  /  DBili  x   /  AST  49<H>  /  ALT  103<H>  /  AlkPhos  84  12-20        MEDICATIONS  (STANDING):    ascorbic acid 1000 milliGRAM(s) Oral daily  cefTRIAXone   IVPB 1000 milliGRAM(s) IV Intermittent every 24 hours  cholecalciferol 1000 Unit(s) Oral daily  dexAMETHasone  Injectable 6 milliGRAM(s) IV Push daily  enoxaparin Injectable 40 milliGRAM(s) SubCutaneous daily  gemfibrozil 600 milliGRAM(s) Oral two times a day  metroNIDAZOLE  IVPB 500 milliGRAM(s) IV Intermittent every 8 hours  metroNIDAZOLE  IVPB      pantoprazole  Injectable 40 milliGRAM(s) IV Push two times a day  zinc sulfate 220 milliGRAM(s) Oral daily        RADIOLOGY & ADDITIONAL TESTS:    12/18/20: CT Abd/pelvis and Chest w/ Oral Cont and w/ IV Cont (12.18.20 @ 19:08) Nodular densities in both lungs and some are ground glass in appearance. Findings may represent pneumonia/pneumonitis.  Clinical correlation is recommended. Follow-up chest CT may be pursued in 4-6 weeks to ensure resolution.  New hepatic steatosis.   Splenomegaly.   Apparent segmental mural thickening of the ascending colon may be due to underdistention or nonspecific colitis. Clinical correlation is recommended.      12/17/20 : Xray Chest 1 View- PORTABLE-Urgent (12.17.20 @ 14:41) The lung fields and pleural  surfaces are unremarkable.        MICROBIOLOGY DATA:    MRSA/MSSA PCR (12.20.20 @ 09:20)   MRSA PCR Result.: NotDetec    C-Reactive Protein, Serum (12.18.20 @ 09:14)   C-Reactive Protein, Serum: 0.33 mg/dL Ferritin, Serum (12.18.20 @ 09:12)   Ferritin, Serum: 783 ng/mL     D-Dimer Assay, Quantitative (12.17.20 @ 14:29)  <150 ng/mL DDU   Respiratory Viral Panel with COVID-19 by JERRY (12.17.20 @ 15:11)   Rapid RVP Result: Detected   SARS-CoV-2: Detected:

## 2020-12-24 NOTE — PROGRESS NOTE ADULT - ASSESSMENT
Patient is a 30y old  Male who works in a [employer], was tested positive for covid 19 on 12/10/20 and now presenting with shortness of breath and blood in stool. Patient was seen in the ED on Friday with same symptoms and was discharged ,He is coming back with hematemesis, diarrhea and blood in stools. Patient endorses severe chest pain that is parasternal localized , increases with eating. Patient also endorses vomiting whenever he tries to eat and loss of appetite. Patient reports that he was saturating around 85% at home, however, in the hospital he is saturating 95% on RA and not in Respiratory distress.  Off note patient has history of constipation and was found to have polyp few years ago, On admission, he found to have no fever, no leukocytosis but tachycardia and positive COVID test. The CXR and Troponin is negative. The ID consult requested to assist with further management.     # COVID infection- currently saturating well at Room air, No indication of Dexamethasone OR Remdesivir   # Nonspecific Colitis OR underdistention  ON CT abd/pelvis shows   Apparent segmental mural thickening of the ascending colon may be due to underdistention or nonspecific colitis.     would recommend:    1. Continue  supportive care  2. Continue Ceftriaxone and Flagyl inpatient and may change to oral Augmentin 875 mg q 12hours on discharge to continue until 12/25/20  3. Monitor oxygen saturation closely and  Bronchodilator as needed  4. COVID precautions   5. OOB to chair    d/w Covering EVA OLIVIA    Attending Attestation:    Spent more than 45 minutes on total encounter, more than 50 % of the visit was spent counseling and/or coordinating care by the Attending physician. Patient is a 30y old  Male who works in a [employer], was tested positive for covid 19 on 12/10/20 and now presenting with shortness of breath and blood in stool. Patient was seen in the ED on Friday with same symptoms and was discharged ,He is coming back with hematemesis, diarrhea and blood in stools. Patient endorses severe chest pain that is parasternal localized , increases with eating. Patient also endorses vomiting whenever he tries to eat and loss of appetite. Patient reports that he was saturating around 85% at home, however, in the hospital he is saturating 95% on RA and not in Respiratory distress.  Off note patient has history of constipation and was found to have polyp few years ago, On admission, he found to have no fever, no leukocytosis but tachycardia and positive COVID test. The CXR and Troponin is negative. The ID consult requested to assist with further management.     # COVID infection- currently saturating well at Room air, No indication of Dexamethasone OR Remdesivir   # Nonspecific Colitis OR underdistention  ON CT abd/pelvis shows   Apparent segmental mural thickening of the ascending colon may be due to underdistention or nonspecific colitis.     would recommend:    1. OOB to chair  2. Continue Ceftriaxone and Flagyl inpatient and may change to oral Augmentin 875 mg q 12hours on discharge to continue until 12/25/20  3. Monitor oxygen saturation closely and  Bronchodilator as needed  4. COVID precautions   5. Continue  supportive care    d/w Nursing staff     Attending Attestation:    Spent more than 45 minutes on total encounter, more than 50 % of the visit was spent counseling and/or coordinating care by the Attending physician.

## 2020-12-24 NOTE — PROGRESS NOTE ADULT - PROBLEM SELECTOR PLAN 2
CT abdomen + nonspecific colitis, symptoms improving   continue Ceftriaxone and Flagyl   advances to solid food yesterday  had 2 diarrhea since last night but no bloody stool   no leukocytosis, afebrile   continue antiemetic and analgesics as needed   Dr Perea ID follows  Dr Murray on board -CT abdomen + nonspecific colitis, symptoms improving   -continue Ceftriaxone and Flagyl until 12/25 per ID  -Consuming portions of meals, tolerated  -No reported diarrhea or bleed today  -No leukocytosis, afebrile   -MSO4 d/c'd as it may be contributing to his nausea  -Started Dexamethasone x 5 days   Dr Perea ID follows  Dr Murray on board

## 2020-12-24 NOTE — PROGRESS NOTE ADULT - ASSESSMENT
29 y/o   male who  tested positive for covid  on 12/10/20 presented  with shortness of breath and blood in stool. Patient was seen in the ED on 12/17  and  discharged. Pt return to the ED with abdominal pain,   diarrhea and blood in stools. On admission pt was positive for  COVID, Troponin is negative. EKG NSR   CT scan of abd showed nonspecific colitis.   CT chest showed nodular densities in both lungs and some are groundglass in appearance concerning for pneumonia/pneumonitis.   Pt. is being treated with IV Ceftriaxone and Flagyl for Colitis with gradual improvement of GI symptoms, still with persisting abdominal tenderness associated with nausea, no vomiting,  Pt seen at bedside, states  abdominal is better, still mild crampy pain but states feels hungry and wants to try solid food. Denies vomiting since yesterday morning. NO diarrhea.  Also complaints of body aches and persistent  headache/ pressure in ears improved with IV tylenol   Saturating 97 % on RA. No difficulties breathing. No fever or leucocytosis    31 y/o   male who  tested positive for covid  on 12/10/20 presented  with shortness of breath and blood in stool. Patient was seen in the ED on 12/17  and  discharged. Pt return to the ED with abdominal pain,   diarrhea and blood in stools. On admission pt was positive for  COVID, Troponin is negative. EKG NSR   CT scan of abd showed nonspecific colitis.   CT chest showed nodular densities in both lungs and some are groundglass in appearance concerning for pneumonia/pneumonitis.   Pt. is being treated with IV Ceftriaxone and Flagyl for Colitis with gradual improvement of GI symptoms, still with persisting abdominal tenderness associated with nausea, no vomiting,  Pt seen at bedside, states  abdomen is better, still mild crampy pain with mild nausea, no vomiting or diarrhea. Saturating 97 % on RA. No difficulty breathing. No fever or leucocytosis.  Pt. is from home, will likely discharge to [facility] when medically stable.

## 2020-12-24 NOTE — PROGRESS NOTE ADULT - ASSESSMENT
29 YO pt who works in a [employer], was tested positive for covid 19 on 12/10/20 is presenting with shortness of breath and blood in stool. Patient was seen in the ED on friday with same symptoms and was discharged ,He is coming back with hematemesis, diarrhea and blood in stools,chest pain and shortness of breath,COVID+ pneumonia.  1.Echocardiogram as outpatient.  2.Prediabetes-diet.  3.Lipid d/o-lopid 600mg bid.  4.Abdominal pain-GI f/u.  5.COVID+zinc,singulair,ID f/u..  6.GI and DVT prophylaxis.

## 2020-12-24 NOTE — PROGRESS NOTE ADULT - PROBLEM SELECTOR PLAN 4
AO X 4  full code  he couldn't sleep last night so cannot discuss GOC at this time, but continue full code AO X 4  full code

## 2020-12-24 NOTE — PROGRESS NOTE ADULT - SUBJECTIVE AND OBJECTIVE BOX
NP Note discussed with  Primary Attending    Patient is a 30y old  Male who presents with a chief complaint of Covid infection (24 Dec 2020 11:40)      INTERVAL HPI/OVERNIGHT EVENTS: no new complaints    MEDICATIONS  (STANDING):  ascorbic acid 1000 milliGRAM(s) Oral daily  cefTRIAXone   IVPB 1000 milliGRAM(s) IV Intermittent every 24 hours  cholecalciferol 1000 Unit(s) Oral daily  dexAMETHasone  Injectable 6 milliGRAM(s) IV Push daily  enoxaparin Injectable 40 milliGRAM(s) SubCutaneous daily  gemfibrozil 600 milliGRAM(s) Oral two times a day  metroNIDAZOLE  IVPB 500 milliGRAM(s) IV Intermittent every 8 hours  metroNIDAZOLE  IVPB      pantoprazole  Injectable 40 milliGRAM(s) IV Push two times a day  zinc sulfate 220 milliGRAM(s) Oral daily    MEDICATIONS  (PRN):  ALBUTerol    90 MICROgram(s) HFA Inhaler 2 Puff(s) Inhalation every 6 hours PRN Shortness of Breath  hydrocodone/homatropine Syrup 5 milliLiter(s) Oral every 4 hours PRN Cough  ondansetron Injectable 4 milliGRAM(s) IV Push every 8 hours PRN Nausea and/or Vomiting      __________________________________________________  REVIEW OF SYSTEMS:    CONSTITUTIONAL: No fever,   EYES: no acute visual disturbances  NECK: No pain or stiffness  RESPIRATORY: No cough; No shortness of breath  CARDIOVASCULAR: No chest pain, no palpitations  GASTROINTESTINAL: No pain. No nausea or vomiting; No diarrhea   NEUROLOGICAL: No headache or numbness, no tremors  MUSCULOSKELETAL: No joint pain, no muscle pain  GENITOURINARY: no dysuria, no frequency, no hesitancy  PSYCHIATRY: no depression , no anxiety  ALL OTHER  ROS negative        Vital Signs Last 24 Hrs  T(C): 36.4 (24 Dec 2020 14:15), Max: 36.4 (24 Dec 2020 14:15)  T(F): 97.6 (24 Dec 2020 14:15), Max: 97.6 (24 Dec 2020 14:15)  HR: 95 (24 Dec 2020 14:15) (71 - 95)  BP: 120/95 (24 Dec 2020 14:15) (109/66 - 120/95)  BP(mean): --  RR: 16 (24 Dec 2020 14:15) (16 - 16)  SpO2: 95% (24 Dec 2020 14:15) (95% - 95%)    ________________________________________________  PHYSICAL EXAM:  well developed, well groomed  GENERAL: NAD  HEENT: Normocephalic;  conjunctivae and sclerae clear; moist mucous membranes;   NECK : supple  CHEST/LUNG: Intermittent dry cough, Clear to auscultation bilaterally with good air entry   HEART: S1 S2  regular; no murmurs, gallops or rubs  ABDOMEN: Soft, Nontender, Nondistended; Bowel sounds present  EXTREMITIES: no cyanosis; no edema; no calf tenderness  SKIN: warm and dry; no rash  NERVOUS SYSTEM:  Awake and alert; Oriented  to place, person and time ; no new deficits    _________________________________________________  LABS:                        14.8   8.31  )-----------( 315      ( 24 Dec 2020 05:57 )             43.7     12-24    140  |  106  |  14  ----------------------------<  104<H>  3.8   |  25  |  1.10    Ca    9.8      24 Dec 2020 05:57  Phos  2.9     12-23  Mg     2.2     12-23    TPro  7.9  /  Alb  3.7  /  TBili  0.5  /  DBili  x   /  AST  66<H>  /  ALT  154<H>  /  AlkPhos  77  12-24        CAPILLARY BLOOD GLUCOSE    RADIOLOGY & ADDITIONAL TESTS:  CT Abdomen and Pelvis w/ Oral Cont and w/ IV Cont (12.18.20 @ 19:09) >    EXAM:  CT ABDOMEN AND PELVIS OC IC                          EXAM:  CT CHEST OC IC                            PROCEDURE DATE:  12/18/2020          INTERPRETATION:  CT of the chest, abdomen, and pelvis with IV and oral contrast    INDICATION: Chest pain. Hematemesis. Hematochezia.    TECHNIQUE: Axial multidetector CT images of the chest, abdomen, and pelvis are acquired following the administration of oral contrast and IV contrast (90 cc of Omnipaque-350 administered, 10 cc discarded)    COMPARISON: Abdominal/pelvic CT dated 10/12/2011.    FINDINGS:    CHEST: No pleural or pericardial effusion. The heart is not enlarged. No evidence for aortic dissection, or aneurysm. No enlarged mediastinal, hilar, or axillary lymph node. The trachea and central bronchi are patent.    No evidence of pneumothorax. Nodular densities in both lungs and some are groundglass in appearance. Findings may represent pneumonia/pneumonitis.  Clinical correlation is recommended.    Abdomen/pelvis: New hepatic steatosis. The gallbladder, common bile duct, pancreas, adrenals and kidneys appear unremarkable. No evidence for a ureteral calculus. No hydronephrosis.    Splenomegaly measuring 16.0 cm.    The appendix appears unremarkable. No bowel obstruction. Apparent segmental mural thickening of the ascending colon may be due to underdistention or nonspecific colitis.    No evidence of free air, ascites, or enlarged lymph node.    Distended urinary bladder which is otherwise unremarkable. The prostate appears grossly unremarkable.    IMPRESSION:    Nodular densities in both lungs and some are groundglass in appearance. Findings may represent pneumonia/pneumonitis.  Clinical correlation is recommended. Follow-up chest CT may be pursued in 4-6 weeks to ensure resolution.    New hepatic steatosis.    Splenomegaly.    Apparent segmental mural thickening of the ascending colon may be due to underdistention or nonspecific colitis. Clinical correlation is recommended.    < end of copied text >    COVID-19  Antibody - for prior infection screening (12.17.20 @ 22:30)  Negative Result:  <= 0.79 Ratio  Equivocal Result:  0.80 - 1.09 Ratio  Positive Result:  >= 1.10 Ratio Ratio    COVID-19 IgG Antibody Interpretation: Positive    Imaging Personally Reviewed:  YES  Consultant(s) Notes Reviewed:   YES  Care Discussed with Consultants :     Plan of care was discussed with patient and /or primary care giver; all questions and concerns were addressed and care was aligned with patient's wishes.

## 2020-12-24 NOTE — PROGRESS NOTE ADULT - PROBLEM SELECTOR PLAN 1
CT chest showed nodular densities in both lungs and some are ground glass in appearance concerning for pneumonia/pneumonitis  --Follow-up chest CT may be pursued in 4-6 weeks to ensure resolution.  saturating 96% room air  continue symptomatic treatment  monitor oxygen saturation, supplemental oxygen as needed   start on IV dexamethasone 6 mg daily and Remdesivir if saturation fall to 93 %  continue with montelukast and bronchodilators   c/o Vitamin D, Zinc and Vitamin C   ID on board Dr Eliazar PIZARRO <150  LFTs trending up but abd pain improved   monitor LFTs A/W SOB found to be COVID19 positive with positive antibodies  -Cont airborne/contact isolation  -Pt. on RA with O2 sats 93-98%, with intermittent dry cough  -Cont Hycodan cough syrup as needed  -Cont Dexamethasone, bronchodilators and vitamins  -Pulm toileting, incentive spirometry  -f/u COVID markers

## 2020-12-24 NOTE — PROGRESS NOTE ADULT - SUBJECTIVE AND OBJECTIVE BOX
CHIEF COMPLAINT:Patient is a 30y old  Male who presents with a chief complaint of Covid infection.Pt appears comfortable.    	  REVIEW OF SYSTEMS:  CONSTITUTIONAL: No fever, weight loss, or fatigue  EYES: No eye pain, visual disturbances, or discharge  ENT:  No difficulty hearing, tinnitus, vertigo; No sinus or throat pain  NECK: No pain or stiffness  RESPIRATORY: No cough, wheezing, chills or hemoptysis; No Shortness of Breath  CARDIOVASCULAR: No chest pain, palpitations, passing out, dizziness, or leg swelling  GASTROINTESTINAL: No abdominal or epigastric pain. No nausea, vomiting, or hematemesis; No diarrhea or constipation. No melena or hematochezia.  GENITOURINARY: No dysuria, frequency, hematuria, or incontinence  NEUROLOGICAL: No headaches, memory loss, loss of strength, numbness, or tremors  SKIN: No itching, burning, rashes, or lesions   LYMPH Nodes: No enlarged glands  ENDOCRINE: No heat or cold intolerance; No hair loss  MUSCULOSKELETAL: No joint pain or swelling; No muscle, back, or extremity pain  PSYCHIATRIC: No depression, anxiety, mood swings, or difficulty sleeping  HEME/LYMPH: No easy bruising, or bleeding gums  ALLERGY AND IMMUNOLOGIC: No hives or eczema	        PHYSICAL EXAM:  T(C): 36.1 (12-24-20 @ 05:10), Max: 36.3 (12-23-20 @ 20:58)  HR: 71 (12-24-20 @ 05:10) (71 - 89)  BP: 109/66 (12-24-20 @ 05:10) (109/66 - 126/77)  RR: 16 (12-24-20 @ 05:10) (16 - 18)  SpO2: 95% (12-24-20 @ 05:10) (95% - 97%)        Appearance: Normal	  HEENT:   Normal oral mucosa, PERRL, EOMI	  Lymphatic: No lymphadenopathy  Cardiovascular: Normal S1 S2, No JVD, No murmurs, No edema  Respiratory: Lungs clear to auscultation	  Psychiatry: A & O x 3, Mood & affect appropriate  Gastrointestinal:  Soft, Non-tender, + BS	  Skin: No rashes, No ecchymoses, No cyanosis	  Neurologic: Non-focal  Extremities: Normal range of motion, No clubbing, cyanosis or edema  Vascular: Peripheral pulses palpable 2+ bilaterally    MEDICATIONS  (STANDING):  ascorbic acid 1000 milliGRAM(s) Oral daily  cefTRIAXone   IVPB 1000 milliGRAM(s) IV Intermittent every 24 hours  cholecalciferol 1000 Unit(s) Oral daily  dexAMETHasone  Injectable 6 milliGRAM(s) IV Push daily  enoxaparin Injectable 40 milliGRAM(s) SubCutaneous daily  gemfibrozil 600 milliGRAM(s) Oral two times a day  metroNIDAZOLE  IVPB 500 milliGRAM(s) IV Intermittent every 8 hours  metroNIDAZOLE  IVPB      pantoprazole  Injectable 40 milliGRAM(s) IV Push two times a day  zinc sulfate 220 milliGRAM(s) Oral daily    	  	  LABS:	 	                         14.8   8.31  )-----------( 315      ( 24 Dec 2020 05:57 )             43.7     12-24    140  |  106  |  14  ----------------------------<  104<H>  3.8   |  25  |  1.10    Ca    9.8      24 Dec 2020 05:57  Phos  2.9     12-23  Mg     2.2     12-23    TPro  7.9  /  Alb  3.7  /  TBili  0.5  /  DBili  x   /  AST  66<H>  /  ALT  154<H>  /  AlkPhos  77  12-24    proBNP:   Lipid Profile: Cholesterol 174  LDL --  HDL 28      HgA1c:   TSH: Thyroid Stimulating Hormone, Serum: 1.64 uU/mL (12-18 @ 06:05)

## 2020-12-24 NOTE — PROGRESS NOTE ADULT - PROBLEM SELECTOR PLAN 5
saturating well on RA  monitor BM for colitis  home when abd pain under controlled saturating well on RA  monitor BM for colitis  home when abd pain controlled

## 2020-12-24 NOTE — PROGRESS NOTE ADULT - SUBJECTIVE AND OBJECTIVE BOX
[   ] ICU                                          [   ] CCU                                      [ X  ] Medical Floor      30 year old male who works in a [employer], was tested positive for COVID-19 on 12/10/20 is presenting with shortness of breath and blood in stool. Patient was seen in the emergency room on Friday with same symptoms and was discharged ,He is coming back with hematemesis, diarrhea and blood in stools. Patient endorses severe chest pain that is parasternal localized increases with eating. Patient also endorses vomiting whenever he tries to eat and loss of appetite. Patient reports that he was saturating around 85% at home, however, in the hospital he is saturating 95% on RA and not in Respiratory distress.     Today patient appears comfortable and no new complaints reported, No abdominal pain, N/V, hematemesis, hematochezia, melena, fever, chills, chest pain, SOB, cough or diarrhea reported.       PAIN MANAGEMENT:  Pain Scale:                0 /10  Pain Location:      Prior Colonoscopy:  Unknown    PAST MEDICAL HISTORY  Colonic polyp       PAST SURGICAL HISTORY  No significant surgical history      Allergies    No Known Allergies    Intolerances  None    SOCIAL HISTORY  Advanced Directives:       [ X ] Full Code       [  ] DNR  Marital Status:         [  ] M      [ X ] S      [  ] D       [  ] W  Children:       [ X ] Yes      [  ] No  Occupation:        [  ] Employed       [ X ] Unemployed       [  ] Retired  Diet:       [ X ] Regular       [  ] PEG feeding          [  ] NG tube feeding  Drug Use:           [ X ] Patient denied          [  ] Yes  Alcohol:           [X  ] No             [  ] Yes (socially)         [  ] Yes (chronic)  Tobacco:           [  ] Yes           [ X ] No    FAMILY HISTORY  [X  ] Heart Disease            [X  ] Diabetes             [ X ] HTN             [  ] Colon Cancer             [  ] Stomach Cancer              [  ] Pancreatic Cancer        VITALS  Vital Signs Last 24 Hrs  T(C): 36.4 (24 Dec 2020 14:15), Max: 36.4 (24 Dec 2020 14:15)  T(F): 97.6 (24 Dec 2020 14:15), Max: 97.6 (24 Dec 2020 14:15)  HR: 95 (24 Dec 2020 14:15) (71 - 95)  BP: 120/95 (24 Dec 2020 14:15) (109/66 - 120/95)   RR: 16 (24 Dec 2020 14:15) (16 - 16)  SpO2: 95% (24 Dec 2020 14:15) (95% - 95%)       MEDICATIONS  (STANDING):  ascorbic acid 1000 milliGRAM(s) Oral daily  cefTRIAXone   IVPB 1000 milliGRAM(s) IV Intermittent every 24 hours  cholecalciferol 1000 Unit(s) Oral daily  dexAMETHasone  Injectable 6 milliGRAM(s) IV Push daily  enoxaparin Injectable 40 milliGRAM(s) SubCutaneous daily  gemfibrozil 600 milliGRAM(s) Oral two times a day  metroNIDAZOLE  IVPB 500 milliGRAM(s) IV Intermittent every 8 hours  metroNIDAZOLE  IVPB      pantoprazole  Injectable 40 milliGRAM(s) IV Push two times a day  zinc sulfate 220 milliGRAM(s) Oral daily    MEDICATIONS  (PRN):  ALBUTerol    90 MICROgram(s) HFA Inhaler 2 Puff(s) Inhalation every 6 hours PRN Shortness of Breath  hydrocodone/homatropine Syrup 5 milliLiter(s) Oral every 4 hours PRN Cough  ondansetron Injectable 4 milliGRAM(s) IV Push every 8 hours PRN Nausea and/or Vomiting                            14.8   8.31  )-----------( 315      ( 24 Dec 2020 05:57 )             43.7       12-24    140  |  106  |  14  ----------------------------<  104<H>  3.8   |  25  |  1.10    Ca    9.8      24 Dec 2020 05:57  Phos  2.9     12-23  Mg     2.2     12-23    TPro  7.9  /  Alb  3.7  /  TBili  0.5  /  DBili  x   /  AST  66<H>  /  ALT  154<H>  /  AlkPhos  77  12-24

## 2020-12-25 LAB
ALBUMIN SERPL ELPH-MCNC: 3.9 G/DL — SIGNIFICANT CHANGE UP (ref 3.5–5)
ALP SERPL-CCNC: 78 U/L — SIGNIFICANT CHANGE UP (ref 40–120)
ALT FLD-CCNC: 139 U/L DA — HIGH (ref 10–60)
ANION GAP SERPL CALC-SCNC: 10 MMOL/L — SIGNIFICANT CHANGE UP (ref 5–17)
AST SERPL-CCNC: 34 U/L — SIGNIFICANT CHANGE UP (ref 10–40)
BASOPHILS # BLD AUTO: 0.02 K/UL — SIGNIFICANT CHANGE UP (ref 0–0.2)
BASOPHILS NFR BLD AUTO: 0.2 % — SIGNIFICANT CHANGE UP (ref 0–2)
BILIRUB SERPL-MCNC: 0.4 MG/DL — SIGNIFICANT CHANGE UP (ref 0.2–1.2)
BUN SERPL-MCNC: 16 MG/DL — SIGNIFICANT CHANGE UP (ref 7–18)
CALCIUM SERPL-MCNC: 9.9 MG/DL — SIGNIFICANT CHANGE UP (ref 8.4–10.5)
CHLORIDE SERPL-SCNC: 104 MMOL/L — SIGNIFICANT CHANGE UP (ref 96–108)
CO2 SERPL-SCNC: 25 MMOL/L — SIGNIFICANT CHANGE UP (ref 22–31)
CREAT SERPL-MCNC: 1.05 MG/DL — SIGNIFICANT CHANGE UP (ref 0.5–1.3)
CRP SERPL-MCNC: 0.19 MG/DL — SIGNIFICANT CHANGE UP (ref 0–0.4)
D DIMER BLD IA.RAPID-MCNC: <150 NG/ML DDU — SIGNIFICANT CHANGE UP
EOSINOPHIL # BLD AUTO: 0.03 K/UL — SIGNIFICANT CHANGE UP (ref 0–0.5)
EOSINOPHIL NFR BLD AUTO: 0.3 % — SIGNIFICANT CHANGE UP (ref 0–6)
FERRITIN SERPL-MCNC: 1123 NG/ML — HIGH (ref 30–400)
GLUCOSE SERPL-MCNC: 122 MG/DL — HIGH (ref 70–99)
HCT VFR BLD CALC: 44.6 % — SIGNIFICANT CHANGE UP (ref 39–50)
HGB BLD-MCNC: 14.7 G/DL — SIGNIFICANT CHANGE UP (ref 13–17)
IMM GRANULOCYTES NFR BLD AUTO: 0.6 % — SIGNIFICANT CHANGE UP (ref 0–1.5)
LDH SERPL L TO P-CCNC: 254 U/L — HIGH (ref 120–225)
LYMPHOCYTES # BLD AUTO: 1.73 K/UL — SIGNIFICANT CHANGE UP (ref 1–3.3)
LYMPHOCYTES # BLD AUTO: 17.4 % — SIGNIFICANT CHANGE UP (ref 13–44)
MCHC RBC-ENTMCNC: 28.1 PG — SIGNIFICANT CHANGE UP (ref 27–34)
MCHC RBC-ENTMCNC: 33 GM/DL — SIGNIFICANT CHANGE UP (ref 32–36)
MCV RBC AUTO: 85.1 FL — SIGNIFICANT CHANGE UP (ref 80–100)
MONOCYTES # BLD AUTO: 0.64 K/UL — SIGNIFICANT CHANGE UP (ref 0–0.9)
MONOCYTES NFR BLD AUTO: 6.4 % — SIGNIFICANT CHANGE UP (ref 2–14)
NEUTROPHILS # BLD AUTO: 7.47 K/UL — HIGH (ref 1.8–7.4)
NEUTROPHILS NFR BLD AUTO: 75.1 % — SIGNIFICANT CHANGE UP (ref 43–77)
NRBC # BLD: 0 /100 WBCS — SIGNIFICANT CHANGE UP (ref 0–0)
PLATELET # BLD AUTO: 323 K/UL — SIGNIFICANT CHANGE UP (ref 150–400)
POTASSIUM SERPL-MCNC: 4 MMOL/L — SIGNIFICANT CHANGE UP (ref 3.5–5.3)
POTASSIUM SERPL-SCNC: 4 MMOL/L — SIGNIFICANT CHANGE UP (ref 3.5–5.3)
PROT SERPL-MCNC: 8.1 G/DL — SIGNIFICANT CHANGE UP (ref 6–8.3)
RBC # BLD: 5.24 M/UL — SIGNIFICANT CHANGE UP (ref 4.2–5.8)
RBC # FLD: 13.2 % — SIGNIFICANT CHANGE UP (ref 10.3–14.5)
SODIUM SERPL-SCNC: 139 MMOL/L — SIGNIFICANT CHANGE UP (ref 135–145)
WBC # BLD: 9.95 K/UL — SIGNIFICANT CHANGE UP (ref 3.8–10.5)
WBC # FLD AUTO: 9.95 K/UL — SIGNIFICANT CHANGE UP (ref 3.8–10.5)

## 2020-12-25 RX ADMIN — CEFTRIAXONE 100 MILLIGRAM(S): 500 INJECTION, POWDER, FOR SOLUTION INTRAMUSCULAR; INTRAVENOUS at 17:17

## 2020-12-25 RX ADMIN — PANTOPRAZOLE SODIUM 40 MILLIGRAM(S): 20 TABLET, DELAYED RELEASE ORAL at 05:27

## 2020-12-25 RX ADMIN — Medication 1000 MILLIGRAM(S): at 11:07

## 2020-12-25 RX ADMIN — Medication 100 MILLIGRAM(S): at 21:45

## 2020-12-25 RX ADMIN — Medication 100 MILLIGRAM(S): at 13:38

## 2020-12-25 RX ADMIN — Medication 1000 UNIT(S): at 11:07

## 2020-12-25 RX ADMIN — Medication 600 MILLIGRAM(S): at 08:13

## 2020-12-25 RX ADMIN — Medication 6 MILLIGRAM(S): at 05:27

## 2020-12-25 RX ADMIN — PANTOPRAZOLE SODIUM 40 MILLIGRAM(S): 20 TABLET, DELAYED RELEASE ORAL at 17:16

## 2020-12-25 RX ADMIN — Medication 100 MILLIGRAM(S): at 05:27

## 2020-12-25 RX ADMIN — ZINC SULFATE TAB 220 MG (50 MG ZINC EQUIVALENT) 220 MILLIGRAM(S): 220 (50 ZN) TAB at 11:07

## 2020-12-25 RX ADMIN — Medication 600 MILLIGRAM(S): at 17:16

## 2020-12-25 RX ADMIN — ENOXAPARIN SODIUM 40 MILLIGRAM(S): 100 INJECTION SUBCUTANEOUS at 11:07

## 2020-12-25 NOTE — PROGRESS NOTE ADULT - SUBJECTIVE AND OBJECTIVE BOX
FRANKO GUNN  MR# [id number]  30yMale        Patient is a 30y old  Male who presents with a chief complaint of Covid infection (25 Dec 2020 11:44)      INTERVAL HPI/OVERNIGHT EVENTS:  Patient seen and examined at bedside. No notations of chest pain, palpitation, SOB, orthopnea, nausea, vomiting or abdominal pain.    ALLERGIES  No Known Allergies      MEDICATIONS  ALBUTerol    90 MICROgram(s) HFA Inhaler 2 Puff(s) Inhalation every 6 hours PRN Shortness of Breath  ascorbic acid 1000 milliGRAM(s) Oral daily  cefTRIAXone   IVPB 1000 milliGRAM(s) IV Intermittent every 24 hours  cholecalciferol 1000 Unit(s) Oral daily  dexAMETHasone  Injectable 6 milliGRAM(s) IV Push daily  enoxaparin Injectable 40 milliGRAM(s) SubCutaneous daily  gemfibrozil 600 milliGRAM(s) Oral two times a day  hydrocodone/homatropine Syrup 5 milliLiter(s) Oral every 4 hours PRN Cough  metroNIDAZOLE  IVPB 500 milliGRAM(s) IV Intermittent every 8 hours  metroNIDAZOLE  IVPB      ondansetron Injectable 4 milliGRAM(s) IV Push every 8 hours PRN Nausea and/or Vomiting  pantoprazole  Injectable 40 milliGRAM(s) IV Push two times a day  zinc sulfate 220 milliGRAM(s) Oral daily              REVIEW OF SYSTEMS:  CONSTITUTIONAL: No fever, weight loss, or fatigue  EYES: No eye pain, visual disturbances, or discharge  ENT:  No difficulty hearing, tinnitus, vertigo; No sinus or throat pain  NECK: No pain or stiffness  RESPIRATORY: No cough, wheezing, chills or hemoptysis; No Shortness of Breath  CARDIOVASCULAR: No chest pain, palpitations, passing out, dizziness, or leg swelling  GASTROINTESTINAL: No abdominal or epigastric pain. No nausea, vomiting, or hematemesis; No diarrhea or constipation. No melena or hematochezia.  GENITOURINARY: No dysuria, frequency, hematuria, or incontinence  NEUROLOGICAL: No headaches, memory loss, loss of strength, numbness, or tremors  SKIN: No itching, burning, rashes, or lesions   LYMPH Nodes: No enlarged glands  ENDOCRINE: No heat or cold intolerance; No hair loss  MUSCULOSKELETAL: No joint pain or swelling; No muscle, back, or extremity pain  PSYCHIATRIC: No depression, anxiety, mood swings, or difficulty sleeping  HEME/LYMPH: No easy bruising, or bleeding gums  ALLERGY AND IMMUNOLOGIC: No hives or eczema	    [ ] All others negative	  [ ] Unable to obtain      T(C): 36.8 (12-25-20 @ 14:49), Max: 36.8 (12-25-20 @ 14:49)  T(F): 98.2 (12-25-20 @ 14:49), Max: 98.2 (12-25-20 @ 14:49)  HR: 82 (12-25-20 @ 14:49) (75 - 102)  BP: 127/69 (12-25-20 @ 14:49) (112/67 - 127/69)  RR: 18 (12-25-20 @ 14:49) (16 - 18)  SpO2: 96% (12-25-20 @ 14:49) (95% - 96%)  Wt(kg): --    I&O's Summary        PHYSICAL EXAM:  A X O x  HEAD:  Atraumatic, Normocephalic  EYES: EOMI, PERRLA, conjunctiva and sclera clear  NECK: Supple, No JVD, Normal thyroid  Resp: CTAB, No crackles, wheezing,   CVS: Regular rate and rhythm; No discernable murmurs, rubs, or gallops  ABD: Soft, Nontender, Nondistended; Bowel sounds present  EXTREMITIES:  2+ Peripheral Pulses, No edema  LYMPH: No dicernable lymphadenopathy noted  GENERAL: NAD, well-groomed, well-developed      LABS:                        14.7   9.95  )-----------( 323      ( 25 Dec 2020 06:05 )             44.6     12-25    139  |  104  |  16  ----------------------------<  122<H>  4.0   |  25  |  1.05    Ca    9.9      25 Dec 2020 06:05    TPro  8.1  /  Alb  3.9  /  TBili  0.4  /  DBili  x   /  AST  34  /  ALT  139<H>  /  AlkPhos  78  12-25        CAPILLARY BLOOD GLUCOSE          Troponins:  ProBNP:  Lipid Profile:   HgA1c:  TSH:           RADIOLOGY & ADDITIONAL TESTS:    Imaging Personally Reviewed:  [ ] YES  [ ] NO      Consultant(s) Notes Reviewed:  [x ] YES  [ ] NO    Care Discussed with Consultants/Other Providers [ x] YES  [ ] NO          PAST MEDICAL & SURGICAL HISTORY:  Colonic polyp    No pertinent past medical history          Infection due to severe acute respiratory syndrome coronavirus 2 (SARS-CoV-2)    Handoff    MEWS Score    Colonic polyp    No pertinent past medical history    No pertinent past medical history    COVID-19    Acute respiratory failure due to COVID-19    Discharge planning issues    Goals of care, counseling/discussion    Colitis    Prophylactic measure    Chest pain    Blood in stool    COVID-19    W/CHEST PAIN    5    Elevated liver enzymes    Colitis    Chest pain    Blood in stool    Hemoptysis    SOB (shortness of breath) on exertion    SysAdmin_VisitLink

## 2020-12-25 NOTE — PROGRESS NOTE ADULT - SUBJECTIVE AND OBJECTIVE BOX
Patient is seen and examined at the bed side, is afebrile.  He is feeling much better, symptoms almost resolved.       REVIEW OF SYSTEMS: All other review systems are negative      ALLERGIES: No Known Allergies      Vital Signs Last 24 Hrs  T(C): 36.8 (25 Dec 2020 14:49), Max: 36.8 (25 Dec 2020 14:49)  T(F): 98.2 (25 Dec 2020 14:49), Max: 98.2 (25 Dec 2020 14:49)  HR: 82 (25 Dec 2020 14:49) (75 - 102)  BP: 127/69 (25 Dec 2020 14:49) (112/67 - 127/69)  BP(mean): --  RR: 18 (25 Dec 2020 14:49) (16 - 18)  SpO2: 96% (25 Dec 2020 14:49) (95% - 96%)      LABS:                        14.7   9.95  )-----------( 323      ( 25 Dec 2020 06:05 )             44.6                           14.8   8.31  )-----------( 315      ( 24 Dec 2020 05:57 )             43.7                12-25    139  |  104  |  16  ----------------------------<  122<H>  4.0   |  25  |  1.05    Ca    9.9      25 Dec 2020 06:05    TPro  8.1  /  Alb  3.9  /  TBili  0.4  /  DBili  x   /  AST  34  /  ALT  139<H>  /  AlkPhos  78  12-25 12-23    139  |  104  |  11  ----------------------------<  102<H>  3.7   |  24  |  1.20    Ca    10.0      23 Dec 2020 07:46  Phos  2.9     12-23  Mg     2.2     12-23    TPro  7.7  /  Alb  3.7  /  TBili  0.7  /  DBili  x   /  AST  73<H>  /  ALT  152<H>  /  AlkPhos  80  12-23    TPro  7.9  /  Alb  3.6  /  TBili  0.9  /  DBili  x   /  AST  49<H>  /  ALT  103<H>  /  AlkPhos  84  12-20        MEDICATIONS  (STANDING):    ascorbic acid 1000 milliGRAM(s) Oral daily  cholecalciferol 1000 Unit(s) Oral daily  dexAMETHasone  Injectable 6 milliGRAM(s) IV Push daily  enoxaparin Injectable 40 milliGRAM(s) SubCutaneous daily  gemfibrozil 600 milliGRAM(s) Oral two times a day  metroNIDAZOLE  IVPB      metroNIDAZOLE  IVPB 500 milliGRAM(s) IV Intermittent every 8 hours  pantoprazole  Injectable 40 milliGRAM(s) IV Push two times a day  zinc sulfate 220 milliGRAM(s) Oral daily      RADIOLOGY & ADDITIONAL TESTS:    12/18/20: CT Abd/pelvis and Chest w/ Oral Cont and w/ IV Cont (12.18.20 @ 19:08) Nodular densities in both lungs and some are ground glass in appearance. Findings may represent pneumonia/pneumonitis.  Clinical correlation is recommended. Follow-up chest CT may be pursued in 4-6 weeks to ensure resolution.  New hepatic steatosis.   Splenomegaly.   Apparent segmental mural thickening of the ascending colon may be due to underdistention or nonspecific colitis. Clinical correlation is recommended.      12/17/20 : Xray Chest 1 View- PORTABLE-Urgent (12.17.20 @ 14:41) The lung fields and pleural  surfaces are unremarkable.        MICROBIOLOGY DATA:    MRSA/MSSA PCR (12.20.20 @ 09:20)   MRSA PCR Result.: NotDetec    C-Reactive Protein, Serum (12.18.20 @ 09:14)   C-Reactive Protein, Serum: 0.33 mg/dL Ferritin, Serum (12.18.20 @ 09:12)   Ferritin, Serum: 783 ng/mL     D-Dimer Assay, Quantitative (12.17.20 @ 14:29)  <150 ng/mL DDU   Respiratory Viral Panel with COVID-19 by JERRY (12.17.20 @ 15:11)   Rapid RVP Result: Detected   SARS-CoV-2: Detected:

## 2020-12-25 NOTE — PROGRESS NOTE ADULT - ASSESSMENT
31 y/o   male who  tested positive for covid  on 12/10/20 presented  with shortness of breath and blood in stool. Patient was seen in the ED on 12/17  and  discharged. Pt return to the ED with abdominal pain,   diarrhea and blood in stools. On admission pt was positive for  COVID, Troponin is negative. EKG NSR   CT scan of abd showed nonspecific colitis.   CT chest showed nodular densities in both lungs and some are groundglass in appearance concerning for pneumonia/pneumonitis.   Pt. is being treated with IV Ceftriaxone and Flagyl for Colitis with gradual improvement of GI symptoms, still with persisting abdominal tenderness associated with nausea, no vomiting,  Pt seen at bedside, states  abdomen is better, still mild crampy pain with mild nausea, no vomiting or diarrhea. Saturating 97 % on RA. No difficulty breathing. No fever or leucocytosis.  Pt. is from home, will likely discharge to [facility] when medically stable.

## 2020-12-25 NOTE — PROGRESS NOTE ADULT - SUBJECTIVE AND OBJECTIVE BOX
[   ] ICU                                          [   ] CCU                                      [ X  ] Medical Floor      30 year old male who works in a [employer], was tested positive for COVID-19 on 12/10/20 is presenting with shortness of breath and blood in stool. Patient was seen in the emergency room on Friday with same symptoms and was discharged ,He is coming back with hematemesis, diarrhea and blood in stools. Patient endorses severe chest pain that is parasternal localized increases with eating. Patient also endorses vomiting whenever he tries to eat and loss of appetite. Patient reports that he was saturating around 85% at home, however, in the hospital he is saturating 95% on RA and not in Respiratory distress.     Today patient appears comfortable and no new complaints reported, No abdominal pain, N/V, hematemesis, hematochezia, melena, fever, chills, chest pain, SOB, cough or diarrhea reported.       PAIN MANAGEMENT:  Pain Scale:                0 /10  Pain Location:      Prior Colonoscopy:  Unknown    PAST MEDICAL HISTORY  Colonic polyp       PAST SURGICAL HISTORY  No significant surgical history      Allergies    No Known Allergies    Intolerances  None    SOCIAL HISTORY  Advanced Directives:       [ X ] Full Code       [  ] DNR  Marital Status:         [  ] M      [ X ] S      [  ] D       [  ] W  Children:       [ X ] Yes      [  ] No  Occupation:        [  ] Employed       [ X ] Unemployed       [  ] Retired  Diet:       [ X ] Regular       [  ] PEG feeding          [  ] NG tube feeding  Drug Use:           [ X ] Patient denied          [  ] Yes  Alcohol:           [X  ] No             [  ] Yes (socially)         [  ] Yes (chronic)  Tobacco:           [  ] Yes           [ X ] No    FAMILY HISTORY  [X  ] Heart Disease            [X  ] Diabetes             [ X ] HTN             [  ] Colon Cancer             [  ] Stomach Cancer              [  ] Pancreatic Cancer          VITALS  Vital Signs Last 24 Hrs  T(C): 36.3 (25 Dec 2020 06:00), Max: 36.5 (24 Dec 2020 20:28)  T(F): 97.3 (25 Dec 2020 06:00), Max: 97.7 (24 Dec 2020 20:28)  HR: 75 (25 Dec 2020 06:00) (75 - 102)  BP: 112/67 (25 Dec 2020 06:00) (112/67 - 124/79)   RR: 16 (25 Dec 2020 06:00) (16 - 16)  SpO2: 96% (25 Dec 2020 06:00) (95% - 96%)       MEDICATIONS  (STANDING):  ascorbic acid 1000 milliGRAM(s) Oral daily  cefTRIAXone   IVPB 1000 milliGRAM(s) IV Intermittent every 24 hours  cholecalciferol 1000 Unit(s) Oral daily  dexAMETHasone  Injectable 6 milliGRAM(s) IV Push daily  enoxaparin Injectable 40 milliGRAM(s) SubCutaneous daily  gemfibrozil 600 milliGRAM(s) Oral two times a day  metroNIDAZOLE  IVPB 500 milliGRAM(s) IV Intermittent every 8 hours  metroNIDAZOLE  IVPB      pantoprazole  Injectable 40 milliGRAM(s) IV Push two times a day  zinc sulfate 220 milliGRAM(s) Oral daily    MEDICATIONS  (PRN):  ALBUTerol    90 MICROgram(s) HFA Inhaler 2 Puff(s) Inhalation every 6 hours PRN Shortness of Breath  hydrocodone/homatropine Syrup 5 milliLiter(s) Oral every 4 hours PRN Cough  ondansetron Injectable 4 milliGRAM(s) IV Push every 8 hours PRN Nausea and/or Vomiting                            14.7   9.95  )-----------( 323      ( 25 Dec 2020 06:05 )             44.6       12-25    139  |  104  |  16  ----------------------------<  122<H>  4.0   |  25  |  1.05    Ca    9.9      25 Dec 2020 06:05    TPro  8.1  /  Alb  3.9  /  TBili  0.4  /  DBili  x   /  AST  34  /  ALT  139<H>  /  AlkPhos  78  12-25

## 2020-12-25 NOTE — PROGRESS NOTE ADULT - ASSESSMENT
29 YO pt who works in a [employer], was tested positive for covid 19 on 12/10/20 is presenting with shortness of breath and blood in stool. Patient was seen in the ED on friday with same symptoms and was discharged ,He is coming back with hematemesis, diarrhea and blood in stools,chest pain and shortness of breath,COVID+ pneumonia.  1.Echocardiogram as outpatient.  2.Prediabetes-diet.  3.Lipid d/o-lopid 600mg bid.  4.Abdominal pain-GI f/u.  5.COVID+zinc,singulair,ID f/u..  6.GI and DVT prophylaxis.

## 2020-12-25 NOTE — PROGRESS NOTE ADULT - SUBJECTIVE AND OBJECTIVE BOX
CHIEF COMPLAINT:Patient is a 30y old  Male who presents with a chief complaint of Covid infection.Pt appears comfortable.    	  REVIEW OF SYSTEMS:  CONSTITUTIONAL: No fever, weight loss, or fatigue  EYES: No eye pain, visual disturbances, or discharge  ENT:  No difficulty hearing, tinnitus, vertigo; No sinus or throat pain  NECK: No pain or stiffness  RESPIRATORY: No cough, wheezing, chills or hemoptysis; No Shortness of Breath  CARDIOVASCULAR: No chest pain, palpitations, passing out, dizziness, or leg swelling  GASTROINTESTINAL: No abdominal or epigastric pain. No nausea, vomiting, or hematemesis; No diarrhea or constipation. No melena or hematochezia.  GENITOURINARY: No dysuria, frequency, hematuria, or incontinence  NEUROLOGICAL: No headaches, memory loss, loss of strength, numbness, or tremors  SKIN: No itching, burning, rashes, or lesions   LYMPH Nodes: No enlarged glands  ENDOCRINE: No heat or cold intolerance; No hair loss  MUSCULOSKELETAL: No joint pain or swelling; No muscle, back, or extremity pain  PSYCHIATRIC: No depression, anxiety, mood swings, or difficulty sleeping  HEME/LYMPH: No easy bruising, or bleeding gums  ALLERGY AND IMMUNOLOGIC: No hives or eczema	        PHYSICAL EXAM:  T(C): 36.3 (12-25-20 @ 06:00), Max: 36.5 (12-24-20 @ 20:28)  HR: 75 (12-25-20 @ 06:00) (75 - 102)  BP: 112/67 (12-25-20 @ 06:00) (112/67 - 124/79)  RR: 16 (12-25-20 @ 06:00) (16 - 16)  SpO2: 96% (12-25-20 @ 06:00) (95% - 96%)  Wt(kg): --  I&O's Summary      Appearance: Normal	  HEENT:   Normal oral mucosa, PERRL, EOMI	  Lymphatic: No lymphadenopathy  Cardiovascular: Normal S1 S2, No JVD, No murmurs, No edema  Respiratory: Lungs clear to auscultation	  Psychiatry: A & O x 3, Mood & affect appropriate  Gastrointestinal:  Soft, Non-tender, + BS	  Skin: No rashes, No ecchymoses, No cyanosis	  Neurologic: Non-focal  Extremities: Normal range of motion, No clubbing, cyanosis or edema  Vascular: Peripheral pulses palpable 2+ bilaterally    MEDICATIONS  (STANDING):  ascorbic acid 1000 milliGRAM(s) Oral daily  cefTRIAXone   IVPB 1000 milliGRAM(s) IV Intermittent every 24 hours  cholecalciferol 1000 Unit(s) Oral daily  dexAMETHasone  Injectable 6 milliGRAM(s) IV Push daily  enoxaparin Injectable 40 milliGRAM(s) SubCutaneous daily  gemfibrozil 600 milliGRAM(s) Oral two times a day  metroNIDAZOLE  IVPB 500 milliGRAM(s) IV Intermittent every 8 hours  metroNIDAZOLE  IVPB      pantoprazole  Injectable 40 milliGRAM(s) IV Push two times a day  zinc sulfate 220 milliGRAM(s) Oral daily    	  LABS:	 	                        14.7   9.95  )-----------( 323      ( 25 Dec 2020 06:05 )             44.6     12-25    139  |  104  |  16  ----------------------------<  122<H>  4.0   |  25  |  1.05    Ca    9.9      25 Dec 2020 06:05    TPro  8.1  /  Alb  3.9  /  TBili  0.4  /  DBili  x   /  AST  34  /  ALT  139<H>  /  AlkPhos  78  12-25      Lipid Profile: Cholesterol 174  LDL --  HDL 28      TSH: Thyroid Stimulating Hormone, Serum: 1.64 uU/mL (12-18 @ 06:05)

## 2020-12-25 NOTE — PROGRESS NOTE ADULT - PROBLEM SELECTOR PROBLEM 3
Prophylactic measure
Chest pain
Prophylactic measure
Chest pain
Chest pain

## 2020-12-25 NOTE — PROGRESS NOTE ADULT - PROBLEM SELECTOR PLAN 2
-CT abdomen + nonspecific colitis, symptoms improving   -continue Ceftriaxone and Flagyl until 12/25 per ID  -Consuming portions of meals, tolerated  -No reported diarrhea or bleed today  -No leukocytosis, afebrile   -MSO4 d/c'd as it may be contributing to his nausea  -Started Dexamethasone x 5 days   Dr Perea ID follows  Dr Murray on board

## 2020-12-25 NOTE — PROGRESS NOTE ADULT - PROBLEM SELECTOR PROBLEM 4
Goals of care, counseling/discussion
Goals of care, counseling/discussion
Prophylactic measure
Goals of care, counseling/discussion

## 2020-12-25 NOTE — PROGRESS NOTE ADULT - ASSESSMENT
Patient is a 30y old  Male who works in a [employer], was tested positive for covid 19 on 12/10/20 and now presenting with shortness of breath and blood in stool. Patient was seen in the ED on Friday with same symptoms and was discharged ,He is coming back with hematemesis, diarrhea and blood in stools. Patient endorses severe chest pain that is parasternal localized , increases with eating. Patient also endorses vomiting whenever he tries to eat and loss of appetite. Patient reports that he was saturating around 85% at home, however, in the hospital he is saturating 95% on RA and not in Respiratory distress.  Off note patient has history of constipation and was found to have polyp few years ago, On admission, he found to have no fever, no leukocytosis but tachycardia and positive COVID test. The CXR and Troponin is negative. The ID consult requested to assist with further management.     # COVID infection- currently saturating well at Room air, No indication of Dexamethasone OR Remdesivir   # Nonspecific Colitis OR underdistention  ON CT abd/pelvis shows   Apparent segmental mural thickening of the ascending colon may be due to underdistention or nonspecific colitis.     would recommend:    1. Less likely need Dexamethasone since saturating well at room air  2. Please discontinue Continue Ceftriaxone and Flagyl after Today's doses  3. Monitor oxygen saturation closely and  Bronchodilator as needed  4. COVID precautions   5. Continue  supportive care    d/w Nursing staff     Attending Attestation:    Spent more than 45 minutes on total encounter, more than 50 % of the visit was spent counseling and/or coordinating care by the Attending physician.

## 2020-12-25 NOTE — PROGRESS NOTE ADULT - PROBLEM SELECTOR PLAN 1
A/W SOB found to be COVID19 positive with positive antibodies  -Cont airborne/contact isolation  -Pt. on RA with O2 sats 93-98%, with intermittent dry cough  -Cont Hycodan cough syrup as needed  -Cont Dexamethasone, bronchodilators and vitamins  -Pulm toileting, incentive spirometry  -f/u COVID markers

## 2020-12-26 ENCOUNTER — TRANSCRIPTION ENCOUNTER (OUTPATIENT)
Age: 30
End: 2020-12-26

## 2020-12-26 VITALS
DIASTOLIC BLOOD PRESSURE: 73 MMHG | HEART RATE: 84 BPM | OXYGEN SATURATION: 95 % | SYSTOLIC BLOOD PRESSURE: 133 MMHG | TEMPERATURE: 97 F | RESPIRATION RATE: 16 BRPM

## 2020-12-26 LAB
ALBUMIN SERPL ELPH-MCNC: 3.7 G/DL — SIGNIFICANT CHANGE UP (ref 3.5–5)
ALP SERPL-CCNC: 72 U/L — SIGNIFICANT CHANGE UP (ref 40–120)
ALT FLD-CCNC: 100 U/L DA — HIGH (ref 10–60)
ANION GAP SERPL CALC-SCNC: 9 MMOL/L — SIGNIFICANT CHANGE UP (ref 5–17)
AST SERPL-CCNC: 23 U/L — SIGNIFICANT CHANGE UP (ref 10–40)
BASOPHILS # BLD AUTO: 0.04 K/UL — SIGNIFICANT CHANGE UP (ref 0–0.2)
BASOPHILS NFR BLD AUTO: 0.4 % — SIGNIFICANT CHANGE UP (ref 0–2)
BILIRUB SERPL-MCNC: 0.5 MG/DL — SIGNIFICANT CHANGE UP (ref 0.2–1.2)
BUN SERPL-MCNC: 18 MG/DL — SIGNIFICANT CHANGE UP (ref 7–18)
CALCIUM SERPL-MCNC: 9.6 MG/DL — SIGNIFICANT CHANGE UP (ref 8.4–10.5)
CHLORIDE SERPL-SCNC: 105 MMOL/L — SIGNIFICANT CHANGE UP (ref 96–108)
CO2 SERPL-SCNC: 25 MMOL/L — SIGNIFICANT CHANGE UP (ref 22–31)
CREAT SERPL-MCNC: 1.05 MG/DL — SIGNIFICANT CHANGE UP (ref 0.5–1.3)
CRP SERPL-MCNC: 0.1 MG/DL — SIGNIFICANT CHANGE UP (ref 0–0.4)
EOSINOPHIL # BLD AUTO: 0.04 K/UL — SIGNIFICANT CHANGE UP (ref 0–0.5)
EOSINOPHIL NFR BLD AUTO: 0.4 % — SIGNIFICANT CHANGE UP (ref 0–6)
GLUCOSE SERPL-MCNC: 120 MG/DL — HIGH (ref 70–99)
HCT VFR BLD CALC: 43.5 % — SIGNIFICANT CHANGE UP (ref 39–50)
HGB BLD-MCNC: 14.4 G/DL — SIGNIFICANT CHANGE UP (ref 13–17)
IMM GRANULOCYTES NFR BLD AUTO: 0.3 % — SIGNIFICANT CHANGE UP (ref 0–1.5)
LYMPHOCYTES # BLD AUTO: 2.69 K/UL — SIGNIFICANT CHANGE UP (ref 1–3.3)
LYMPHOCYTES # BLD AUTO: 26.3 % — SIGNIFICANT CHANGE UP (ref 13–44)
MCHC RBC-ENTMCNC: 28.4 PG — SIGNIFICANT CHANGE UP (ref 27–34)
MCHC RBC-ENTMCNC: 33.1 GM/DL — SIGNIFICANT CHANGE UP (ref 32–36)
MCV RBC AUTO: 85.8 FL — SIGNIFICANT CHANGE UP (ref 80–100)
MONOCYTES # BLD AUTO: 0.86 K/UL — SIGNIFICANT CHANGE UP (ref 0–0.9)
MONOCYTES NFR BLD AUTO: 8.4 % — SIGNIFICANT CHANGE UP (ref 2–14)
NEUTROPHILS # BLD AUTO: 6.56 K/UL — SIGNIFICANT CHANGE UP (ref 1.8–7.4)
NEUTROPHILS NFR BLD AUTO: 64.2 % — SIGNIFICANT CHANGE UP (ref 43–77)
NRBC # BLD: 0 /100 WBCS — SIGNIFICANT CHANGE UP (ref 0–0)
PLATELET # BLD AUTO: 313 K/UL — SIGNIFICANT CHANGE UP (ref 150–400)
POTASSIUM SERPL-MCNC: 3.8 MMOL/L — SIGNIFICANT CHANGE UP (ref 3.5–5.3)
POTASSIUM SERPL-SCNC: 3.8 MMOL/L — SIGNIFICANT CHANGE UP (ref 3.5–5.3)
PROT SERPL-MCNC: 7.6 G/DL — SIGNIFICANT CHANGE UP (ref 6–8.3)
RBC # BLD: 5.07 M/UL — SIGNIFICANT CHANGE UP (ref 4.2–5.8)
RBC # FLD: 13.4 % — SIGNIFICANT CHANGE UP (ref 10.3–14.5)
SODIUM SERPL-SCNC: 139 MMOL/L — SIGNIFICANT CHANGE UP (ref 135–145)
WBC # BLD: 10.22 K/UL — SIGNIFICANT CHANGE UP (ref 3.8–10.5)
WBC # FLD AUTO: 10.22 K/UL — SIGNIFICANT CHANGE UP (ref 3.8–10.5)

## 2020-12-26 PROCEDURE — 83735 ASSAY OF MAGNESIUM: CPT

## 2020-12-26 PROCEDURE — 87641 MR-STAPH DNA AMP PROBE: CPT

## 2020-12-26 PROCEDURE — 83690 ASSAY OF LIPASE: CPT

## 2020-12-26 PROCEDURE — 86140 C-REACTIVE PROTEIN: CPT

## 2020-12-26 PROCEDURE — 85379 FIBRIN DEGRADATION QUANT: CPT

## 2020-12-26 PROCEDURE — 96374 THER/PROPH/DIAG INJ IV PUSH: CPT

## 2020-12-26 PROCEDURE — 87640 STAPH A DNA AMP PROBE: CPT

## 2020-12-26 PROCEDURE — 84484 ASSAY OF TROPONIN QUANT: CPT

## 2020-12-26 PROCEDURE — 74177 CT ABD & PELVIS W/CONTRAST: CPT

## 2020-12-26 PROCEDURE — 80061 LIPID PANEL: CPT

## 2020-12-26 PROCEDURE — 71260 CT THORAX DX C+: CPT

## 2020-12-26 PROCEDURE — 82550 ASSAY OF CK (CPK): CPT

## 2020-12-26 PROCEDURE — 80053 COMPREHEN METABOLIC PANEL: CPT

## 2020-12-26 PROCEDURE — 84443 ASSAY THYROID STIM HORMONE: CPT

## 2020-12-26 PROCEDURE — 82306 VITAMIN D 25 HYDROXY: CPT

## 2020-12-26 PROCEDURE — 84100 ASSAY OF PHOSPHORUS: CPT

## 2020-12-26 PROCEDURE — 81001 URINALYSIS AUTO W/SCOPE: CPT

## 2020-12-26 PROCEDURE — 85652 RBC SED RATE AUTOMATED: CPT

## 2020-12-26 PROCEDURE — 82746 ASSAY OF FOLIC ACID SERUM: CPT

## 2020-12-26 PROCEDURE — 83036 HEMOGLOBIN GLYCOSYLATED A1C: CPT

## 2020-12-26 PROCEDURE — 99285 EMERGENCY DEPT VISIT HI MDM: CPT

## 2020-12-26 PROCEDURE — 82150 ASSAY OF AMYLASE: CPT

## 2020-12-26 PROCEDURE — 83615 LACTATE (LD) (LDH) ENZYME: CPT

## 2020-12-26 PROCEDURE — 85025 COMPLETE CBC W/AUTO DIFF WBC: CPT

## 2020-12-26 PROCEDURE — 93005 ELECTROCARDIOGRAM TRACING: CPT

## 2020-12-26 PROCEDURE — 82607 VITAMIN B-12: CPT

## 2020-12-26 PROCEDURE — 0225U NFCT DS DNA&RNA 21 SARSCOV2: CPT

## 2020-12-26 PROCEDURE — 86769 SARS-COV-2 COVID-19 ANTIBODY: CPT

## 2020-12-26 PROCEDURE — 82728 ASSAY OF FERRITIN: CPT

## 2020-12-26 PROCEDURE — 94640 AIRWAY INHALATION TREATMENT: CPT

## 2020-12-26 PROCEDURE — 71045 X-RAY EXAM CHEST 1 VIEW: CPT

## 2020-12-26 PROCEDURE — 36415 COLL VENOUS BLD VENIPUNCTURE: CPT

## 2020-12-26 RX ORDER — ASCORBIC ACID 60 MG
1 TABLET,CHEWABLE ORAL
Qty: 30 | Refills: 0
Start: 2020-12-26 | End: 2021-01-24

## 2020-12-26 RX ORDER — CHOLECALCIFEROL (VITAMIN D3) 125 MCG
1000 CAPSULE ORAL
Qty: 1 | Refills: 0
Start: 2020-12-26 | End: 2021-01-24

## 2020-12-26 RX ORDER — ZINC SULFATE TAB 220 MG (50 MG ZINC EQUIVALENT) 220 (50 ZN) MG
1 TAB ORAL
Qty: 14 | Refills: 0
Start: 2020-12-26 | End: 2021-01-08

## 2020-12-26 RX ORDER — PANTOPRAZOLE SODIUM 20 MG/1
1 TABLET, DELAYED RELEASE ORAL
Qty: 28 | Refills: 0
Start: 2020-12-26 | End: 2021-01-08

## 2020-12-26 RX ORDER — APIXABAN 2.5 MG/1
1 TABLET, FILM COATED ORAL
Qty: 60 | Refills: 0
Start: 2020-12-26 | End: 2021-01-24

## 2020-12-26 RX ORDER — GEMFIBROZIL 600 MG
1 TABLET ORAL
Qty: 60 | Refills: 0
Start: 2020-12-26 | End: 2021-01-24

## 2020-12-26 RX ORDER — ALBUTEROL 90 UG/1
2 AEROSOL, METERED ORAL
Qty: 112 | Refills: 0
Start: 2020-12-26 | End: 2021-01-08

## 2020-12-26 RX ADMIN — ENOXAPARIN SODIUM 40 MILLIGRAM(S): 100 INJECTION SUBCUTANEOUS at 13:02

## 2020-12-26 RX ADMIN — Medication 1000 MILLIGRAM(S): at 13:01

## 2020-12-26 RX ADMIN — Medication 100 MILLIGRAM(S): at 05:08

## 2020-12-26 RX ADMIN — Medication 6 MILLIGRAM(S): at 05:08

## 2020-12-26 RX ADMIN — Medication 1000 UNIT(S): at 13:01

## 2020-12-26 RX ADMIN — Medication 600 MILLIGRAM(S): at 08:47

## 2020-12-26 RX ADMIN — ZINC SULFATE TAB 220 MG (50 MG ZINC EQUIVALENT) 220 MILLIGRAM(S): 220 (50 ZN) TAB at 13:02

## 2020-12-26 RX ADMIN — PANTOPRAZOLE SODIUM 40 MILLIGRAM(S): 20 TABLET, DELAYED RELEASE ORAL at 05:09

## 2020-12-26 NOTE — PROGRESS NOTE ADULT - SUBJECTIVE AND OBJECTIVE BOX
[   ] ICU                                          [   ] CCU                                      [ x  ] Medical Floor      30 year old male who works in a [employer], was tested positive for COVID-19 on 12/10/20 is presenting with shortness of breath and blood in stool. Patient was seen in the emergency room on Friday with same symptoms and was discharged ,He is coming back with hematemesis, diarrhea and blood in stools. Patient endorses severe chest pain that is parasternal localized increases with eating. Patient also endorses vomiting whenever he tries to eat and loss of appetite. Patient reports that he was saturating around 85% at home, however, in the hospital he is saturating 95% on RA and not in Respiratory distress.     Today patient appears comfortable and no new complaints reported, No abdominal pain, N/V, hematemesis, hematochezia, melena, fever, chills, chest pain, SOB, cough or diarrhea reported.       PAIN MANAGEMENT:  Pain Scale:                0 /10  Pain Location:      Prior Colonoscopy:  Unknown    PAST MEDICAL HISTORY  Colonic polyp       PAST SURGICAL HISTORY  No significant surgical history      Allergies    No Known Allergies    Intolerances  None    SOCIAL HISTORY  Advanced Directives:       [ X ] Full Code       [  ] DNR  Marital Status:         [  ] M      [ X ] S      [  ] D       [  ] W  Children:       [ X ] Yes      [  ] No  Occupation:        [  ] Employed       [ X ] Unemployed       [  ] Retired  Diet:       [ X ] Regular       [  ] PEG feeding          [  ] NG tube feeding  Drug Use:           [ X ] Patient denied          [  ] Yes  Alcohol:           [X  ] No             [  ] Yes (socially)         [  ] Yes (chronic)  Tobacco:           [  ] Yes           [ X ] No      FAMILY HISTORY  [X  ] Heart Disease            [X  ] Diabetes             [ X ] HTN             [  ] Colon Cancer             [  ] Stomach Cancer              [  ] Pancreatic Cancer          VITALS  Vital Signs Last 24 Hrs  T(C): 36.1 (26 Dec 2020 04:55), Max: 36.8 (25 Dec 2020 14:49)  T(F): 97 (26 Dec 2020 04:55), Max: 98.2 (25 Dec 2020 14:49)  HR: 74 (26 Dec 2020 04:55) (74 - 82)  BP: 121/76 (26 Dec 2020 04:55) (117/71 - 127/69)   RR: 16 (26 Dec 2020 04:55) (14 - 18)  SpO2: 99% (26 Dec 2020 04:55) (96% - 99%)       MEDICATIONS  (STANDING):  ascorbic acid 1000 milliGRAM(s) Oral daily  cholecalciferol 1000 Unit(s) Oral daily  dexAMETHasone  Injectable 6 milliGRAM(s) IV Push daily  enoxaparin Injectable 40 milliGRAM(s) SubCutaneous daily  gemfibrozil 600 milliGRAM(s) Oral two times a day  pantoprazole  Injectable 40 milliGRAM(s) IV Push two times a day  zinc sulfate 220 milliGRAM(s) Oral daily    MEDICATIONS  (PRN):  ALBUTerol    90 MICROgram(s) HFA Inhaler 2 Puff(s) Inhalation every 6 hours PRN Shortness of Breath  hydrocodone/homatropine Syrup 5 milliLiter(s) Oral every 4 hours PRN Cough  ondansetron Injectable 4 milliGRAM(s) IV Push every 8 hours PRN Nausea and/or Vomiting                            14.4   10.22 )-----------( 313      ( 26 Dec 2020 05:46 )             43.5       12-26    139  |  105  |  18  ----------------------------<  120<H>  3.8   |  25  |  1.05    Ca    9.6      26 Dec 2020 05:46    TPro  7.6  /  Alb  3.7  /  TBili  0.5  /  DBili  x   /  AST  23  /  ALT  100<H>  /  AlkPhos  72  12-26

## 2020-12-26 NOTE — PROGRESS NOTE ADULT - NEGATIVE RESPIRATORY AND THORAX SYMPTOMS
no wheezing/no dyspnea/no hemoptysis

## 2020-12-26 NOTE — PROGRESS NOTE ADULT - NEUROLOGICAL DETAILS
responds to pain/responds to verbal commands/sensation intact/cranial nerves intact
responds to pain/responds to verbal commands/sensation intact/cranial nerves intact
alert and oriented x 3/responds to pain/responds to verbal commands/sensation intact/cranial nerves intact
responds to pain/responds to verbal commands/sensation intact/cranial nerves intact

## 2020-12-26 NOTE — PROGRESS NOTE ADULT - NEGATIVE SKIN SYMPTOMS
no rash/no itching/no dryness/no brittle nails/no pitted nails/no hair loss

## 2020-12-26 NOTE — DISCHARGE NOTE NURSING/CASE MANAGEMENT/SOCIAL WORK - PATIENT PORTAL LINK FT
You can access the FollowMyHealth Patient Portal offered by [facility] by registering at the following website: [email or web address]. By joining [employer]’s FollowMyHealth portal, you will also be able to view your health information using other applications (apps) compatible with our system.

## 2020-12-26 NOTE — PROGRESS NOTE ADULT - RS GEN PE MLT RESP DETAILS PC
airway patent/breath sounds equal/good air movement/respirations non-labored/no rales/no rhonchi/no wheezes
airway patent/breath sounds equal/good air movement/respirations non-labored/no rales/no rhonchi/no wheezes
airway patent/breath sounds equal/good air movement/no rales/no rhonchi/no wheezes
airway patent/breath sounds equal/good air movement/no rales/no rhonchi/no wheezes
airway patent/breath sounds equal/good air movement/respirations non-labored/no rales/no rhonchi/no wheezes
airway patent/breath sounds equal/good air movement/respirations non-labored/no rales/no rhonchi/no wheezes

## 2020-12-26 NOTE — PROGRESS NOTE ADULT - MS EXT PE MLT D E PC
no clubbing/no cyanosis

## 2020-12-26 NOTE — PROGRESS NOTE ADULT - RS GEN HX ROS MEA POS PC
cough/pleuritic chest pain

## 2020-12-26 NOTE — PROGRESS NOTE ADULT - ATTENDING COMMENTS
Given c/o hemoptysis and abdo pain amidst a positive covid, will eval pt with CT C & A/P to r/o the unlikely involvement of PE.
D/C planning possibly for a.m. pending continued improvement of hypoxia and tolerance of regular diet.
Awaiting repeat colonoscopy tomorrow pending GI.
Given c/o hemoptysis and abdo pain amidst a positive covid, will eval pt with CT C & A/P to r/o the unlikely involvement of PE.
Pt PO2 appearing to have improved, should he be able to maintain a sat above 96% on RA will proceed with d/c planning on tapering dose steroids and abx rxed for colonitis.
D/C planning possibly for a.m. pending continued improvement of hypoxia and tolerance of regular diet.
D/C planning possibly for a.m. pending continued improvement of hypoxia and tolerance of regular diet.

## 2020-12-26 NOTE — PROGRESS NOTE ADULT - NEGATIVE GENERAL SYMPTOMS
no fever/no chills/no sweating/no anorexia/no weight loss/no polyphagia/no polyuria/no polydipsia

## 2020-12-26 NOTE — PROGRESS NOTE ADULT - SKIN
Quality 226: Preventive Care And Screening: Tobacco Use: Screening And Cessation Intervention: Patient screened for tobacco use and is an ex/non-smoker Detail Level: Detailed warm and dry/pale Quality 130: Documentation Of Current Medications In The Medical Record: Current Medications Documented detailed exam

## 2020-12-26 NOTE — PROGRESS NOTE ADULT - TONSILS
no redness/no swelling

## 2020-12-26 NOTE — PROGRESS NOTE ADULT - ASSESSMENT
1. Colitis improving  2. Hematochezia stopped   3. Hematemesis stopped   4. COVID-19 positive improving  5. Elevated LFT's  improving    Suggestions:    1. Monitor H/H  2. Transfuse PRBC as needed  3. Avoid NSAID  4. Protonix 40mg daily  5. Follow up LFT's  6. Supportive care  7. DVT prophylaxis

## 2020-12-26 NOTE — PROGRESS NOTE ADULT - NEGATIVE CARDIOVASCULAR SYMPTOMS
no chest pain/no palpitations/no orthopnea

## 2020-12-26 NOTE — PROGRESS NOTE ADULT - ASSESSMENT
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 Patient was seen in the ED on friday with same symptoms and was discharged ,He is coming back with hematemesis, diarrhea and blood in stools,chest pain and shortness of breath,COVID+ pneumonia.  1.Echocardiogram as outpatient.  2.Prediabetes-diet.  3.Lipid d/o-lopid 600mg bid.  4.Abdominal pain-GI f/u.  5.COVID+zinc,singulair,ID f/u..  6.GI and DVT prophylaxis.

## 2020-12-26 NOTE — PROGRESS NOTE ADULT - GASTROINTESTINAL DETAILS
soft/nontender/no distention/no masses palpable/bowel sounds normal/no bruit/no guarding/no rigidity
soft/nontender/no distention/no masses palpable/bowel sounds normal/no bruit/no guarding/no rigidity/no organomegaly
soft/nontender/no distention/no masses palpable/bowel sounds normal/no bruit/no rebound tenderness/no guarding/no rigidity
soft/nontender/no distention/no masses palpable/bowel sounds normal/no bruit/no rebound tenderness/no guarding/no rigidity
soft/nontender/no distention/no masses palpable/bowel sounds normal/no bruit/no rebound tenderness/no guarding/no rigidity/no organomegaly
soft/nontender/no distention/no masses palpable/bowel sounds normal/no bruit/no rebound tenderness/no guarding/no rigidity

## 2020-12-26 NOTE — PROGRESS NOTE ADULT - REASON FOR ADMISSION
Covid infection
COVID-19 positive

## 2020-12-26 NOTE — PROGRESS NOTE ADULT - [facility] PE MLT D E PC
regular rate and rhythm/no rub

## 2020-12-26 NOTE — PROGRESS NOTE ADULT - NEGATIVE GASTROINTESTINAL SYMPTOMS
no abdominal pain/no melena/no steatorrhea/no jaundice/no hiccoughs

## 2020-12-26 NOTE — PROGRESS NOTE ADULT - CARDIOVASCULAR DETAILS
positive S1/positive S2

## 2020-12-26 NOTE — PROGRESS NOTE ADULT - GIT GEN HX ROS MEA POS PC
nausea/vomiting/diarrhea/hematochezia

## 2020-12-26 NOTE — PROGRESS NOTE ADULT - NEGATIVE HEMATOLOGY SYMPTOMS
no gum bleeding/no nose bleeding/no skin lumps

## 2020-12-26 NOTE — PROGRESS NOTE ADULT - NSHPATTENDINGPLANDISCUSS_GEN_ALL_CORE
house staff covering

## 2020-12-26 NOTE — PROGRESS NOTE ADULT - PROVIDER SPECIALTY LIST ADULT
Infectious Disease
Infectious Disease
Internal Medicine
Pulmonology
Cardiology
Infectious Disease
Cardiology
Infectious Disease
Infectious Disease
Cardiology
Gastroenterology
Internal Medicine
Gastroenterology
Internal Medicine
Gastroenterology

## 2020-12-26 NOTE — PROGRESS NOTE ADULT - MOUTH
<<-----Click here for Discharge Medication Review
normal mouth and gums/moist

## 2020-12-26 NOTE — PROGRESS NOTE ADULT - PHARYNX
no redness/no discharge

## 2020-12-26 NOTE — PROGRESS NOTE ADULT - NEGATIVE GENERAL GENITOURINARY SYMPTOMS
no hematuria/no renal colic/no flank pain L/no flank pain R/no incontinence/no dysuria

## 2020-12-26 NOTE — PROGRESS NOTE ADULT - NOSE
no discharge/no deviation

## 2020-12-26 NOTE — PROGRESS NOTE ADULT - SUBJECTIVE AND OBJECTIVE BOX
CHIEF COMPLAINT:Patient is a 30y old  Male who presents with a chief complaint of Covid infection.Pt appears comfortable.    	  REVIEW OF SYSTEMS:  CONSTITUTIONAL: No fever, weight loss, or fatigue  EYES: No eye pain, visual disturbances, or discharge  ENT:  No difficulty hearing, tinnitus, vertigo; No sinus or throat pain  NECK: No pain or stiffness  RESPIRATORY: No cough, wheezing, chills or hemoptysis; No Shortness of Breath  CARDIOVASCULAR: No chest pain, palpitations, passing out, dizziness, or leg swelling  GASTROINTESTINAL: No abdominal or epigastric pain. No nausea, vomiting, or hematemesis; No diarrhea or constipation. No melena or hematochezia.  GENITOURINARY: No dysuria, frequency, hematuria, or incontinence  NEUROLOGICAL: No headaches, memory loss, loss of strength, numbness, or tremors  SKIN: No itching, burning, rashes, or lesions   LYMPH Nodes: No enlarged glands  ENDOCRINE: No heat or cold intolerance; No hair loss  MUSCULOSKELETAL: No joint pain or swelling; No muscle, back, or extremity pain  PSYCHIATRIC: No depression, anxiety, mood swings, or difficulty sleeping  HEME/LYMPH: No easy bruising, or bleeding gums  ALLERGY AND IMMUNOLOGIC: No hives or eczema	        PHYSICAL EXAM:  T(C): 36.1 (12-26-20 @ 04:55), Max: 36.8 (12-25-20 @ 14:49)  HR: 74 (12-26-20 @ 04:55) (74 - 82)  BP: 121/76 (12-26-20 @ 04:55) (117/71 - 127/69)  RR: 16 (12-26-20 @ 04:55) (14 - 18)  SpO2: 99% (12-26-20 @ 04:55) (96% - 99%)        Appearance: Normal	  HEENT:   Normal oral mucosa, PERRL, EOMI	  Lymphatic: No lymphadenopathy  Cardiovascular: Normal S1 S2, No JVD, No murmurs, No edema  Respiratory: Lungs clear to auscultation	  Psychiatry: A & O x 3, Mood & affect appropriate  Gastrointestinal:  Soft, Non-tender, + BS	  Skin: No rashes, No ecchymoses, No cyanosis	  Neurologic: Non-focal  Extremities: Normal range of motion, No clubbing, cyanosis or edema  Vascular: Peripheral pulses palpable 2+ bilaterally    MEDICATIONS  (STANDING):  ascorbic acid 1000 milliGRAM(s) Oral daily  cholecalciferol 1000 Unit(s) Oral daily  dexAMETHasone  Injectable 6 milliGRAM(s) IV Push daily  enoxaparin Injectable 40 milliGRAM(s) SubCutaneous daily  gemfibrozil 600 milliGRAM(s) Oral two times a day  pantoprazole  Injectable 40 milliGRAM(s) IV Push two times a day  zinc sulfate 220 milliGRAM(s) Oral daily      	  	  LABS:	 	                       14.4   10.22 )-----------( 313      ( 26 Dec 2020 05:46 )             43.5     12-26    139  |  105  |  18  ----------------------------<  120<H>  3.8   |  25  |  1.05    Ca    9.6      26 Dec 2020 05:46    TPro  7.6  /  Alb  3.7  /  TBili  0.5  /  DBili  x   /  AST  23  /  ALT  100<H>  /  AlkPhos  72  12-26    proBNP:   Lipid Profile: Cholesterol 174  LDL --  HDL 28      HgA1c:   TSH: Thyroid Stimulating Hormone, Serum: 1.64 uU/mL (12-18 @ 06:05)

## 2020-12-27 ENCOUNTER — TRANSCRIPTION ENCOUNTER (OUTPATIENT)
Age: 30
End: 2020-12-27

## 2021-01-05 ENCOUNTER — TRANSCRIPTION ENCOUNTER (OUTPATIENT)
Age: 31
End: 2021-01-05

## 2021-04-25 ENCOUNTER — TRANSCRIPTION ENCOUNTER (OUTPATIENT)
Age: 31
End: 2021-04-25

## 2021-07-02 PROBLEM — K63.5 POLYP OF COLON: Chronic | Status: ACTIVE | Noted: 2020-12-17

## 2021-07-12 ENCOUNTER — APPOINTMENT (OUTPATIENT)
Dept: GASTROENTEROLOGY | Facility: CLINIC | Age: 31
End: 2021-07-12
Payer: COMMERCIAL

## 2021-07-12 VITALS
SYSTOLIC BLOOD PRESSURE: 126 MMHG | HEART RATE: 85 BPM | TEMPERATURE: 97.5 F | HEIGHT: 75 IN | OXYGEN SATURATION: 98 % | WEIGHT: 250 LBS | DIASTOLIC BLOOD PRESSURE: 70 MMHG | BODY MASS INDEX: 31.08 KG/M2

## 2021-07-12 DIAGNOSIS — Z78.9 OTHER SPECIFIED HEALTH STATUS: ICD-10-CM

## 2021-07-12 PROCEDURE — 99202 OFFICE O/P NEW SF 15 MIN: CPT

## 2021-07-12 PROCEDURE — 99072 ADDL SUPL MATRL&STAF TM PHE: CPT

## 2021-07-12 RX ORDER — MESALAMINE 1.2 G/1
1.2 TABLET, DELAYED RELEASE ORAL DAILY
Qty: 90 | Refills: 3 | Status: ACTIVE | COMMUNITY
Start: 2021-07-12 | End: 1900-01-01

## 2021-07-12 RX ORDER — ESOMEPRAZOLE MAGNESIUM 40 MG/1
40 CAPSULE, DELAYED RELEASE ORAL DAILY
Qty: 90 | Refills: 3 | Status: ACTIVE | COMMUNITY
Start: 2021-07-12 | End: 1900-01-01

## 2021-07-12 NOTE — ASSESSMENT
[FreeTextEntry1] : Patient is a 30-year-old male who has no history of hypertension coronary artery disease but does have glucose intolerance.  In December he had significant Covid symptoms and was hospitalized and intubated.  During the course of hospitalization the patient underwent a work-up which included a CAT scan that revealed fatty liver as well as colitis.  Since discharge he does notice bloody bowel movements with abdominal cramping.  Interestingly his stools are formed but there is blood admixed with the feces.  He is also having significant upper symptoms which seem to be consistent with gastritis or early peptic ulcer disease.  It is difficult to ascertain which symptoms are post Covid complications.  I told the patient to start proton pump inhibitor as soon as possible.  He is to use it twice a day for 5 to 7 days and then go to once daily.  We will need to get a copy of his recent endoscopy and colonoscopy report to see what the baseline was before the Covid infection.  Since the CAT scan did reveal colitis I opted to start the patient on mesalamine.  His lower symptoms do not warrant steroid use.  It appears as if the colitis symptoms were acute in onset and related to his Covid.  I will review his colonoscopy report as soon as it becomes available to see if there was any signs of colonic inflammation prior to December.  The patient will call me in 10 days with a progress report at which time we will decide whether we need to do further evaluation imaging stool testing or blood work.

## 2021-07-12 NOTE — PHYSICAL EXAM
[General Appearance - Alert] : alert [General Appearance - In No Acute Distress] : in no acute distress [General Appearance - Well Nourished] : well nourished [FreeTextEntry1] : Patient is moderately overweight. [Respiration, Rhythm And Depth] : normal respiratory rhythm and effort [Auscultation Breath Sounds / Voice Sounds] : lungs were clear to auscultation bilaterally [Apical Impulse] : the apical impulse was normal [Heart Rate And Rhythm] : heart rate was normal and rhythm regular [Heart Sounds] : normal S1 and S2 [Bowel Sounds] : normal bowel sounds [Abdomen Soft] : soft [Abdomen Tenderness] : non-tender [] : no hepato-splenomegaly

## 2021-07-12 NOTE — HISTORY OF PRESENT ILLNESS
[FreeTextEntry1] : I saw her Mr. Jadiel Oneil in the office today.  The patient is a 30-year-old male who generally enjoyed good health.  He did have occasional episodes of rectal bleeding and dyspepsia and in October underwent an upper endoscopy and colonoscopy by another physician.  He was told at that time that he had a colon polyp which was removed and was found to have gastritis.  The patient was given medication but did not take it regularly.  In December the patient developed significant Covid symptoms and was hospitalized and intubated.  He was given multiple medications during the course of his hospitalization and slowly recovered.  While in the hospital he was diagnosed with enlarged liver and spleen as well as colitis.  The patient developed bloody bowel movements but is not aware of the timeframe including as to whether it was truly present when he had his colonoscopy done in October, although the doctor did not make that diagnosis.  He continues to have bloody bowel movements and indeed showed me a picture of his stool.  There was bright red blood in the toilet water and admixed with the stool.  Interestingly the patient is only going to the bathroom however 2-3 times a day.  Was CAT scan evidence of colitis when he was in the hospital.  The patient does not abuse tobacco caffeine or ethanol he is also noticing significant reflux symptoms with daily regurgitation and nausea, vomit up previously ingested food.  There is no blood in the vomitus.  He was given a prescription for a proton pump inhibitor but did not take it regularly.  He has been diagnosed with high blood pressure or diabetes although he states his hemoglobin A1c has been elevated.

## 2021-07-17 ENCOUNTER — TRANSCRIPTION ENCOUNTER (OUTPATIENT)
Age: 31
End: 2021-07-17

## 2021-07-29 ENCOUNTER — TRANSCRIPTION ENCOUNTER (OUTPATIENT)
Age: 31
End: 2021-07-29

## 2022-03-17 ENCOUNTER — TRANSCRIPTION ENCOUNTER (OUTPATIENT)
Age: 32
End: 2022-03-17

## 2022-05-13 ENCOUNTER — APPOINTMENT (OUTPATIENT)
Dept: GASTROENTEROLOGY | Facility: CLINIC | Age: 32
End: 2022-05-13
Payer: COMMERCIAL

## 2022-05-13 VITALS
WEIGHT: 257 LBS | SYSTOLIC BLOOD PRESSURE: 114 MMHG | HEART RATE: 105 BPM | HEIGHT: 73 IN | DIASTOLIC BLOOD PRESSURE: 70 MMHG | TEMPERATURE: 96.8 F | OXYGEN SATURATION: 97 % | BODY MASS INDEX: 34.06 KG/M2

## 2022-05-13 DIAGNOSIS — K92.1 MELENA: ICD-10-CM

## 2022-05-13 DIAGNOSIS — K52.3 INDETERMINATE COLITIS: ICD-10-CM

## 2022-05-13 DIAGNOSIS — K21.9 GASTRO-ESOPHAGEAL REFLUX DISEASE W/OUT ESOPHAGITIS: ICD-10-CM

## 2022-05-13 DIAGNOSIS — Z78.9 OTHER SPECIFIED HEALTH STATUS: ICD-10-CM

## 2022-05-13 DIAGNOSIS — Z80.8 FAMILY HISTORY OF MALIGNANT NEOPLASM OF OTHER ORGANS OR SYSTEMS: ICD-10-CM

## 2022-05-13 DIAGNOSIS — R11.0 NAUSEA: ICD-10-CM

## 2022-05-13 PROCEDURE — 99213 OFFICE O/P EST LOW 20 MIN: CPT

## 2022-05-13 RX ORDER — ONDANSETRON 8 MG/1
8 TABLET ORAL TWICE DAILY
Qty: 30 | Refills: 1 | Status: ACTIVE | COMMUNITY
Start: 2022-05-13 | End: 1900-01-01

## 2022-05-13 RX ORDER — DICYCLOMINE HYDROCHLORIDE 20 MG/1
20 TABLET ORAL
Qty: 30 | Refills: 1 | Status: COMPLETED | COMMUNITY
Start: 2022-05-13 | End: 2022-06-02

## 2022-05-13 RX ORDER — DEXLANSOPRAZOLE 60 MG/1
60 CAPSULE, DELAYED RELEASE ORAL
Qty: 30 | Refills: 3 | Status: ACTIVE | COMMUNITY
Start: 2022-05-13 | End: 1900-01-01

## 2022-05-13 NOTE — ASSESSMENT
[FreeTextEntry1] : The patient is a 31-year-old male who had significant COVID.  Prior to this the patient was having  chronic GI symptoms and apparently had an endoscopy and colonoscopy.  Currently he is having significant epigastric pain and is not on any acid reducing medication.  Patient's nausea is preventing him from eating.  He also has rare lower abdominal discomfort and may be having colon spasm although his symptoms are not consistent with florid colitis.  It is difficult to ascertain the chronicity of the symptoms since he has had several setbacks.  I did asked the patient to provide me with copies of his previous endoscopy reports again as well as his hospitalization at [facility] to better determine what inflammatory findings were noted.  The patient was given a medication to treat his nausea as well as Dexilant to take once a day.  He was also given a low-dose antispasmodic to use in the event he is getting lower abdominal cramps.  The patient was told to call me in 10 days to 2 weeks with a progress report.  He will be seeing a doctor for his acute back pain but was told on several occasions not to use any anti-inflammatories at all.

## 2022-05-13 NOTE — PHYSICAL EXAM
[General Appearance - Alert] : alert [General Appearance - In No Acute Distress] : in no acute distress [General Appearance - Well Nourished] : well nourished [General Appearance - Well Developed] : well developed [Respiration, Rhythm And Depth] : normal respiratory rhythm and effort [Auscultation Breath Sounds / Voice Sounds] : lungs were clear to auscultation bilaterally [Apical Impulse] : the apical impulse was normal [Heart Rate And Rhythm] : heart rate was normal and rhythm regular [Heart Sounds] : normal S1 and S2 [Bowel Sounds] : normal bowel sounds [Abdomen Soft] : soft [] : no hepato-splenomegaly [FreeTextEntry1] : There is mild epigastric tenderness.

## 2022-05-13 NOTE — HISTORY OF PRESENT ILLNESS
[FreeTextEntry1] : I saw patient Jadiel Oneil the office today.  The patient was seen last year for various gastrointestinal symptoms.  At that time the patient provided a history of having significant COVID requiring intubation and prolonged hospitalization.  Prior to that the patient was being evaluated for bloody diarrhea and dyspeptic symptoms and was told he had colitis.  When I saw the patient in the office he was having frequent bowel movements as well as dyspepsia and was given both mesalamine as well as a proton pump inhibitor.  The patient stated he was doing quite well for a period of time but after the medication ran out he developed recurrence of the symptoms and in fact was hospitalized in [place] and underwent a repeat endoscopy and colonoscopy.  The patient was told he had gastric polyps and possible colitis.  The patient currently presents with a chief complaint of significant epigastric burning and nausea.  He is not taking any acid reducing medication.  The patient coincidentally hurt his back and did take at least 1-2 Naprosyn's during this period of time.  He also notices intermittent episodes of crampy lower abdominal discomfort but in general his stool is well formed and brown.  He has had episodes of bright red blood on the toilet tissue with clots.  The patient does not abuse tobacco caffeine or ethanol.

## 2022-05-13 NOTE — REVIEW OF SYSTEMS
[As Noted in HPI] : as noted in HPI [Abdominal Pain] : abdominal pain [Heartburn] : heartburn [Negative] : Respiratory

## 2022-05-26 NOTE — PROGRESS NOTE ADULT - CONSTITUTIONAL DETAILS
well-developed/well-groomed/no distress
well-developed/well-groomed/well-nourished/no distress
well-developed/well-groomed/no distress
well-developed/well-groomed/no distress
well-developed/well-groomed/well-nourished/no distress
left
well-developed/well-groomed/no distress

## 2022-07-21 ENCOUNTER — APPOINTMENT (OUTPATIENT)
Dept: GASTROENTEROLOGY | Facility: CLINIC | Age: 32
End: 2022-07-21

## 2022-08-18 ENCOUNTER — APPOINTMENT (OUTPATIENT)
Dept: GASTROENTEROLOGY | Facility: CLINIC | Age: 32
End: 2022-08-18

## 2022-11-25 ENCOUNTER — NON-APPOINTMENT (OUTPATIENT)
Age: 32
End: 2022-11-25

## 2022-12-08 ENCOUNTER — APPOINTMENT (OUTPATIENT)
Dept: GASTROENTEROLOGY | Facility: CLINIC | Age: 32
End: 2022-12-08

## 2022-12-08 PROCEDURE — SNS01: CPT

## 2023-02-02 NOTE — REVIEW OF SYSTEMS
[facility] Medicine Admission      Date of Admission: 2/1/2023      Primary Care Physician: Maricruz Davis APRN      Chief Complaint: DKA    HPI:    She is a 21 yo that presented with DKA. Gap has closed. Nausea has improved some. Still with electrolyte abnormalities.     Concurrent Medical History:  has a past medical history of Abdominal pain, Acute bronchitis, Acute pharyngitis, Allergic rhinitis, Asteatotic eczema, Carbuncle of buttock, Chalazion, Conjunctivitis, Constipation, Contact dermatitis, Diabetic ketoacidosis (HCC), Diarrhea, DKA (diabetic ketoacidoses), Esotropia, Fatigue, Folliculitis, Hordeolum internum of right lower eyelid, Influenza, Laceration of skin of chin, Nausea and vomiting, Otitis media, Tibial torsion, Type 1 diabetes mellitus (HCC), and Vitamin D deficiency.    Past Surgical History:  has a past surgical history that includes Cryotherapy (10/13/2010) and Other surgical history (05/04/2011).    Family History: family history includes Asthma in her sister; Breast cancer in her maternal grandmother; Heart disease in her maternal grandfather; Hypertension in her mother.     Social History:  reports that she has never smoked. She has never used smokeless tobacco. She reports that she does not drink alcohol and does not use drugs.    Allergies:   Allergies   Allergen Reactions   • Cefprozil Hives       Medications:   Prior to Admission medications    Medication Sig Start Date End Date Taking? Authorizing Provider   Acetone, Urine, Test (TRUEPLUS KETONE TEST STRIPS) Place 1 each on the skin as directed by provider As Needed (hyperglycemia). 7/15/22  Yes Mariano Fulton MD   albuterol (PROAIR RESPICLICK) 108 (90 Base) MCG/ACT inhaler Inhale 2 puffs Every 4 (Four) Hours As Needed for Wheezing or Shortness of Air. 9/9/20  Yes Jocelin Vergara MD   Alcohol Swabs (Alcohol Wipes) 70 % pads Use 4 x daily 10/28/22  Yes Caleb Sexton  MD RICCARDO Dale-MILTON ULTRA-FINE 33 LANCETS misc Use 4 x daily , any brand covered by insurance 10/28/22  Yes Mariano Fulton MD   Blood Glucose Monitoring Suppl w/Device kit USE AS INDICATED, ANY MONITOR , ICD10 code is E11.9 10/28/22  Yes Mariano Fulton MD   Blood Glucose/Ketone Monitor device Use as indicated 7/15/22  Yes Mariano Fulton MD   Cholecalciferol (Vitamin D3) 1.25 MG (76822 UT) capsule Take 1 capsule by mouth Every 7 (Seven) Days. 7/17/22  Yes Mariano Fulton MD   Glucagon (Baqsimi One Pack) 3 MG/DOSE powder 1 each into the nostril(s) as directed by provider As Needed (Hypoglycemia). Apply intranasal if hypoglycemia 7/15/22  Yes Mariano Fulton MD   Glucose Blood (Blood Glucose Test) strip Use 4 x daily use any brand covered by insurance or same brand as before ICD10 code is E11.9 10/28/22  Yes Mariano Fulton MD   Insulin Glargine, 2 Unit Dial, (Toujeo Max SoloStar) 300 UNIT/ML solution pen-injector injection Inject 50 Units under the skin into the appropriate area as directed Daily. 7/15/22  Yes Mariano Fulton MD   Insulin Lispro-aabc 200 UNIT/ML solution pen-injector Inject 40 Units under the skin into the appropriate area as directed 3 (Three) Times a Day With Meals. 7/15/22  Yes Mariano Fulton MD   Insulin Pen Needle (B-MILTON UF III MINI PEN NEEDLES) 31G X 5 MM misc USE 4-6 TIMES PER DAY 7/15/22  Yes Mariano Fulton MD   Ketone Blood Test strip Use as needed w hyperglycemia 7/15/22  Yes Mariano Fulton MD   Lancet Devices (Lancing Device) misc USE AS INDICATED TO CORRELATE WITH STRIPS AND METER 10/28/22  Yes Mariano Fulton MD   ondansetron (ZOFRAN) 4 MG tablet Take 4 mg by mouth Every 8 (Eight) Hours As Needed. 3/15/21  Yes Provider, Karen, MD   sertraline (ZOLOFT) 50 MG tablet Take 50 mg by mouth Daily. 3/26/20  Yes Provider, Historical, MD   Urine Glucose-Ketones Test (Keto-Diastix)  strip Test if BG greater than 250 4/22/21  Yes Mariano Fulton MD   Continuous Blood Gluc Sensor (Dexcom G6 Sensor) As Needed (glucose control). Every 10 days,  Use as directed for continuous glucose monitoring 7/15/22   Mariano Fulton MD   fluconazole (DIFLUCAN) 150 MG tablet Take 1 tablet by mouth today and repeat in 4 days if symptoms still present. 8/1/18   Dorcas Roberson APRN   Insulin Regular Human 60x4 &60x8 & 60x12 UNIT powder Inhale 4-32 Units 3 (Three) Times a Day With Meals. 10/28/22   Mariano Fulton MD   Norethin Ace-Eth Estrad-FE (JUNEL FE 24 PO) Take 1 tablet by mouth Daily.    Provider, MD Karen       I have utilized all available immediate resources to obtain, update, or review the patient's current medications (including all prescriptions, over-the-counter products, herbals, cannabis/cannabidiol products, and vitamin/mineral/dietary (nutritional) supplements).     Review of Systems:  Review of Systems   Gastrointestinal: Positive for nausea.      Otherwise complete ROS is negative except as mentioned above.    Physical Exam:   Temp:  [97.9 °F (36.6 °C)-98.7 °F (37.1 °C)] 97.9 °F (36.6 °C)  Heart Rate:  [101-132] 110  Resp:  [18-20] 18  BP: (103-127)/(56-89) 108/66  Physical Exam  Vitals reviewed.   Constitutional:       General: She is not in acute distress.     Appearance: She is well-developed.   HENT:      Head: Normocephalic and atraumatic.      Nose: Nose normal.   Eyes:      Conjunctiva/sclera: Conjunctivae normal.   Cardiovascular:      Rate and Rhythm: Normal rate and regular rhythm.   Pulmonary:      Effort: Pulmonary effort is normal. No respiratory distress.      Breath sounds: Normal breath sounds. No wheezing or rales.   Musculoskeletal:         General: Normal range of motion.      Cervical back: Normal range of motion and neck supple.   Skin:     General: Skin is warm and dry.   Neurological:      Mental Status: She is alert and oriented to  person, place, and time.   Psychiatric:         Behavior: Behavior normal.           Results Reviewed:  I have personally reviewed current lab, radiology, and data and agree with results.  Lab Results (last 24 hours)     Procedure Component Value Units Date/Time    POC Glucose Once [[id number]]  (Abnormal) Collected: 02/02/23 1052    Specimen: Blood Updated: 02/02/23 1114     Glucose 282 mg/dL      Comment: RN NotifiedOperator: [id number] CLEMENCIA BRITOISSAMeter ID: [id number]       Phosphorus [[id number]]  (Abnormal) Collected: 02/02/23 0754    Specimen: Blood Updated: 02/02/23 1031     Phosphorus 1.3 mg/dL     POC Glucose Once [[id number]]  (Normal) Collected: 02/02/23 0630    Specimen: Blood Updated: 02/02/23 1027     Glucose 127 mg/dL      Comment: : [id number] ALISHA WEBERALLMeter ID: [id number]       POC Glucose Once [[id number]]  (Abnormal) Collected: 02/02/23 0530    Specimen: Blood Updated: 02/02/23 1026     Glucose 163 mg/dL      Comment: : [id number] BETTY NICKMeter ID: [id number]       POC Glucose Once [[id number]]  (Abnormal) Collected: 02/02/23 0420    Specimen: Blood Updated: 02/02/23 1026     Glucose 163 mg/dL      Comment: : [id number] SHEREE MENENDEZIEMeter ID: [id number]       POC Glucose Once [[id number]]  (Abnormal) Collected: 02/02/23 0327    Specimen: Blood Updated: 02/02/23 1026     Glucose 181 mg/dL      Comment: : [id number] BETTY NICKMeter ID: [id number]       POC Glucose Once [[id number]]  (Abnormal) Collected: 02/02/23 0225    Specimen: Blood Updated: 02/02/23 1026     Glucose 195 mg/dL      Comment: : [id number] PABLO YODERANDAMeter ID: [id number]       POC Glucose Once [[id number]]  (Abnormal) Collected: 02/02/23 0128    Specimen: Blood Updated: 02/02/23 1026     Glucose 204 mg/dL      Comment: : [id number] ALISHA MARSHALLMeter ID: [id number]       POC Glucose Once [[id number]]  (Abnormal) Collected: 02/02/23 0027    Specimen: Blood  Updated: 02/02/23 1026     Glucose 221 mg/dL      Comment: : [id number] BETTY FERREIRAMeter ID: [id number]       POC Glucose Once [[id number]]  (Abnormal) Collected: 02/01/23 2322    Specimen: Blood Updated: 02/02/23 1025     Glucose 208 mg/dL      Comment: : [id number] SHEREE MENENDEZIEMeter ID: [id number]       POC Glucose Once [[id number]]  (Abnormal) Collected: 02/01/23 2226    Specimen: Blood Updated: 02/02/23 1025     Glucose 217 mg/dL      Comment: : [id number] ALISHA WEBERALLMeter ID: [id number]       POC Glucose Once [[id number]]  (Abnormal) Collected: 02/01/23 2128    Specimen: Blood Updated: 02/02/23 1025     Glucose 213 mg/dL      Comment: : [id number] JAKE CAINSEAMeter ID: [id number]       POC Glucose Once [[id number]]  (Abnormal) Collected: 02/01/23 2005    Specimen: Blood Updated: 02/02/23 1025     Glucose 266 mg/dL      Comment: Result Not ConfirmedOperator: [id number] SHEREE MENENDEZIEMeter ID: [id number]       POC Glucose Once [[id number]]  (Normal) Collected: 02/02/23 0847    Specimen: Blood Updated: 02/02/23 1024     Glucose 95 mg/dL      Comment: : [id number] DUSTY ANTHONYMeter ID: [id number]       Extra Tubes [[id number]] Collected: 02/02/23 0754    Specimen: Blood, Venous Line Updated: 02/02/23 0900    Narrative:      The following orders were created for panel order Extra Tubes.  Procedure                               Abnormality         Status                     ---------                               -----------         ------                     Lavender Top[[id number]]                                     Final result                 Please view results for these tests on the individual orders.    Lavender Top [[id number]] Collected: 02/02/23 0754    Specimen: Blood Updated: 02/02/23 0900     Extra Tube hold for add-on     Comment: Auto resulted       Basic Metabolic Panel [[id number]]  (Abnormal) Collected: 02/02/23 0754    Specimen: Blood Updated:  02/02/23 0829     Glucose 116 mg/dL      BUN 8 mg/dL      Creatinine 0.53 mg/dL      Sodium 132 mmol/L      Potassium 3.4 mmol/L      Chloride 106 mmol/L      CO2 19.0 mmol/L      Calcium 8.3 mg/dL      BUN/Creatinine Ratio 15.1     Anion Gap 7.0 mmol/L      eGFR 134.3 mL/min/1.73     Narrative:      GFR Normal >60  Chronic Kidney Disease <60  Kidney Failure <15      Acetone [[id number]]  (Abnormal) Collected: 02/02/23 0754    Specimen: Blood Updated: 02/02/23 0821     Acetone Small    Phosphorus [[id number]]  (Abnormal) Collected: 02/02/23 0413    Specimen: Blood Updated: 02/02/23 0809     Phosphorus 1.5 mg/dL     Magnesium [[id number]]  (Normal) Collected: 02/02/23 0413    Specimen: Blood Updated: 02/02/23 0804     Magnesium 2.0 mg/dL     POC Glucose Once [[id number]]  (Normal) Collected: 02/02/23 0738    Specimen: Blood Updated: 02/02/23 0755     Glucose 105 mg/dL      Comment: : [id number] DUSTY ANTHONYMeter ID: [id number]       Basic Metabolic Panel [[id number]]  (Abnormal) Collected: 02/02/23 0413    Specimen: Blood Updated: 02/02/23 0515     Glucose 193 mg/dL      BUN 9 mg/dL      Creatinine 0.58 mg/dL      Sodium 130 mmol/L      Potassium 3.8 mmol/L      Chloride 103 mmol/L      CO2 15.0 mmol/L      Calcium 8.3 mg/dL      BUN/Creatinine Ratio 15.5     Anion Gap 12.0 mmol/L      eGFR 131.4 mL/min/1.73     Narrative:      GFR Normal >60  Chronic Kidney Disease <60  Kidney Failure <15      CBC & Differential [[id number]]  (Abnormal) Collected: 02/02/23 0413    Specimen: Blood Updated: 02/02/23 0501    Narrative:      The following orders were created for panel order CBC & Differential.  Procedure                               Abnormality         Status                     ---------                               -----------         ------                     CBC Auto Differential[[id number]]        Abnormal            Final result                 Please view results for these tests on the individual orders.     CBC Auto Differential [[id number]]  (Abnormal) Collected: 02/02/23 0413    Specimen: Blood Updated: 02/02/23 0501     WBC 13.24 10*3/mm3      RBC 4.56 10*6/mm3      Hemoglobin 13.5 g/dL      Hematocrit 40.9 %      MCV 89.7 fL      MCH 29.6 pg      MCHC 33.0 g/dL      RDW 12.0 %      RDW-SD 39.1 fl      MPV 9.3 fL      Platelets 450 10*3/mm3      Neutrophil % 79.9 %      Lymphocyte % 11.2 %      Monocyte % 8.5 %      Eosinophil % 0.0 %      Basophil % 0.2 %      Immature Grans % 0.2 %      Neutrophils, Absolute 10.58 10*3/mm3      Lymphocytes, Absolute 1.48 10*3/mm3      Monocytes, Absolute 1.13 10*3/mm3      Eosinophils, Absolute 0.00 10*3/mm3      Basophils, Absolute 0.02 10*3/mm3      Immature Grans, Absolute 0.03 10*3/mm3      nRBC 0.0 /100 WBC     Basic Metabolic Panel [[id number]]  (Abnormal) Collected: 02/02/23 0001    Specimen: Blood Updated: 02/02/23 0028     Glucose 226 mg/dL      BUN 9 mg/dL      Creatinine 0.81 mg/dL      Sodium 133 mmol/L      Potassium 4.1 mmol/L      Chloride 104 mmol/L      CO2 11.0 mmol/L      Calcium 8.3 mg/dL      BUN/Creatinine Ratio 11.1     Anion Gap 18.0 mmol/L      eGFR 105.4 mL/min/1.73     Narrative:      GFR Normal >60  Chronic Kidney Disease <60  Kidney Failure <15      POC Glucose Once [[id number]]  (Abnormal) Collected: 02/01/23 1822    Specimen: Blood Updated: 02/01/23 1839     Glucose 301 mg/dL      Comment: : [id number] SHEA ESPARZAMeter ID: [id number]       Osmolality, Serum [[id number]]  (Abnormal) Collected: 02/01/23 1711    Specimen: Blood Updated: 02/01/23 1758     Osmolality 304 mOsm/kg     Comprehensive Metabolic Panel [[id number]]  (Abnormal) Collected: 02/01/23 1711    Specimen: Blood from Arm, Right Updated: 02/01/23 1748     Glucose 364 mg/dL      BUN 12 mg/dL      Creatinine 0.74 mg/dL      Sodium 129 mmol/L      Potassium 5.1 mmol/L      Comment: Slight hemolysis detected by analyzer. Results may be affected.        Chloride 97 mmol/L      CO2  7.0 mmol/L      Calcium 9.0 mg/dL      Total Protein 8.1 g/dL      Albumin 4.2 g/dL      ALT (SGPT) 18 U/L      AST (SGOT) 22 U/L      Comment: Slight hemolysis detected by analyzer. Results may be affected.        Alkaline Phosphatase 109 U/L      Total Bilirubin 0.2 mg/dL      Globulin 3.9 gm/dL      A/G Ratio 1.1 g/dL      BUN/Creatinine Ratio 16.2     Anion Gap 25.0 mmol/L      eGFR 117.5 mL/min/1.73     Narrative:      GFR Normal >60  Chronic Kidney Disease <60  Kidney Failure <15      Phosphorus [[id number]]  (Normal) Collected: 02/01/23 1711    Specimen: Blood from Arm, Right Updated: 02/01/23 1745     Phosphorus 3.9 mg/dL     Magnesium [[id number]]  (Abnormal) Collected: 02/01/23 1711    Specimen: Blood from Arm, Right Updated: 02/01/23 1745     Magnesium 1.5 mg/dL     Ketone Bodies, Serum (Not performed at [facility]) [[id number]]  (Abnormal) Collected: 02/01/23 1711    Specimen: Blood from Arm, Right Updated: 02/01/23 1736    Narrative:      The following orders were created for panel order Ketone Bodies, Serum (Not performed at [facility]).  Procedure                               Abnormality         Status                     ---------                               -----------         ------                     Acetone[[id number]]                      Abnormal            Final result                 Please view results for these tests on the individual orders.    Acetone [[id number]]  (Abnormal) Collected: 02/01/23 1711    Specimen: Blood from Arm, Right Updated: 02/01/23 1736     Acetone Large    Hemoglobin A1c [[id number]]  (Abnormal) Collected: 02/01/23 1711    Specimen: Blood Updated: 02/01/23 1733     Hemoglobin A1C 11.30 %     Narrative:      Hemoglobin A1C Ranges:    Increased Risk for Diabetes  5.7% to 6.4%  Diabetes                     >= 6.5%  Diabetic Goal                < 7.0%    POC Glucose Once [[id number]]  (Abnormal) Collected: 02/01/23 1710    Specimen: Blood Updated: 02/01/23 1723     Glucose 290  mg/dL      Comment: RN NotifiedOperator: [id number] ZEYAD EVANGELINEMeter ID: [id number]       POC Glucose Once [[id number]]  (Abnormal) Collected: 02/01/23 1539    Specimen: Blood Updated: 02/01/23 1722     Glucose 348 mg/dL      Comment: RN NotifiedNotify DoctorOperator: [id number] ZEYAD EVANGELINEMeter ID: [id number]       CBC & Differential [[id number]]  (Abnormal) Collected: 02/01/23 1626    Specimen: Blood from Arm, Right Updated: 02/01/23 1638    Narrative:      The following orders were created for panel order CBC & Differential.  Procedure                               Abnormality         Status                     ---------                               -----------         ------                     CBC Auto Differential[[id number]]        Abnormal            Final result                 Please view results for these tests on the individual orders.    CBC Auto Differential [[id number]]  (Abnormal) Collected: 02/01/23 1626    Specimen: Blood from Arm, Right Updated: 02/01/23 1638     WBC 15.60 10*3/mm3      RBC 5.20 10*6/mm3      Hemoglobin 15.8 g/dL      Hematocrit 46.9 %      MCV 90.2 fL      MCH 30.4 pg      MCHC 33.7 g/dL      RDW 12.3 %      RDW-SD 40.2 fl      MPV 9.9 fL      Platelets 489 10*3/mm3      Neutrophil % 80.8 %      Lymphocyte % 12.2 %      Monocyte % 5.8 %      Eosinophil % 0.1 %      Basophil % 0.5 %      Immature Grans % 0.6 %      Neutrophils, Absolute 12.60 10*3/mm3      Lymphocytes, Absolute 1.90 10*3/mm3      Monocytes, Absolute 0.90 10*3/mm3      Eosinophils, Absolute 0.02 10*3/mm3      Basophils, Absolute 0.08 10*3/mm3      Immature Grans, Absolute 0.10 10*3/mm3      nRBC 0.0 /100 WBC         Imaging Results (Last 24 Hours)     Procedure Component Value Units Date/Time    US Guided Vascular Access [[id number]] Collected: 02/01/23 1705     Updated: 02/02/23 0723    Narrative:      PROCEDURE: Ultrasound Guidance Vascular Access      Ordering physician(s): MEE HARLEY  ANNE    Clinical Indication: Venous access      Findings:    The vessel was sonographically evaluated and determined to be  patent. Concurrent realtime ultrasound was used to visualize  needle entry into the right basilic vein and a permanent image  for permanent recording and reporting.         Impression:      Impression: Please see above findings.    Electronically signed by:  Stephen Londono MD  2/2/2023 7:21 AM CST  Workstation: [id number]    IR Insert Midline Without Port Pump 5 Plus [[id number]] Resulted: 02/01/23 1735     Updated: 02/01/23 1735    Narrative:      This procedure was auto-finalized with no dictation required.    XR Chest 1 View [[id number]] Collected: 02/01/23 1609     Updated: 02/01/23 1631    Narrative:        PORTABLE CHEST    HISTORY: Diabetic ketoacidosis    Portable AP upright film of the chest was obtained at 4:01 PM.  COMPARISON: August 5, 2019    FINDINGS:   The lungs are clear of an acute process.  The heart is not enlarged.  The pulmonary vasculature is not increased.  No pleural effusion.  No pneumothorax.  No acute osseous abnormality.      Impression:      CONCLUSION:  Normal portable chest    [id number]    Electronically signed by:  Gucci Vergara MD  2/1/2023 4:28 PM CST  Workstation: [id number]            Assessment and Treatment Plan:     Active Hospital Problems    Diagnosis    • **DKA, type 1 (HCC)    • Diabetic ketoacidosis without coma associated with type 1 diabetes mellitus (HCC)      DKA, now resolved     -insulin drip > change to Levemir   -IVF  -BMP change to daily   -diabetic diet    -A1c 11.30   -home regimen is 46u at night and SSI   -monitor and replace electrolytes            Medical Decision Making  Number and Complexity of problems: one complex        Conditions and Status:        Condition is unchanged.     Decision rules/scores evaluated (example [id number], [place], etc): N/A               Care Planning     Code status and discussions: Full Code     I confirmed that  the patient's Advance Care Plan is present, code status is documented, or surrogate decision maker is listed in the patient's medical record.     Disposition  Transfer to the floor, home tomorrow if electrolytes have normalized                   This document has been electronically signed by Laura Ferrer MD on February 2, 2023 11:18 CST                 [As Noted in HPI] : as noted in HPI [Negative] : Genitourinary [FreeTextEntry2] : Decreased appetite. [FreeTextEntry7] : The patient has significant acid reflux symptoms with regurgitation.  He notices bloody stool.

## 2023-11-16 NOTE — ED PROVIDER NOTE - NEURO NEGATIVE STATEMENT, MLM
Occupational Therapy    OT Treatment    Patient Name: Azul Roberts  MRN: [id number]  Today's Date: 11/16/2023  Time Calculation  Start Time: 1134  Stop Time: 1202  Time Calculation (min): 28 min         Assessment:  OT Assessment: Pt hesitant with participation  Prognosis: Good  Barriers to Discharge: None  End of Session Communication: Bedside nurse  End of Session Patient Position: Up in chair, Alarm on (ptressure alarm)  Prognosis: Good  Barriers to Discharge: None  Barriers to Participation: Attitude of self  Plan:  Treatment Interventions: ADL retraining, UE strengthening/ROM, Compensatory technique education, Patient/family training, Equipment evaluation/education  OT Frequency: 3 times per week  OT Discharge Recommendations: Low intensity level of continued care  Equipment Recommended upon Discharge: Wheeled walker  OT Recommended Transfer Status: Stand by assist  OT - OK to Discharge: Yes  Treatment Interventions: ADL retraining, UE strengthening/ROM, Compensatory technique education, Patient/family training, Equipment evaluation/education    Subjective   Previous Visit Info:  OT Last Visit  OT Received On: 11/16/23  Precautions:     Vital Signs:     Pain:  Pain Assessment  Pain Assessment: 0-10  Pain Score: 8  Pain Type: Chronic pain  Pain Location: Hip  Pain Orientation: Left    Objective    Cognition:     Coordination:     Activities of Daily Living: Feeding  Feeding Level of Assistance: Independent  Feeding Where Assessed: Chair  Feeding Comments: Pt self feed cracker and pop snack to include open packages    Grooming  Grooming Level of Assistance: Modified independent  Grooming Where Assessed: Standing sinkside  Grooming Comments: Pt washing hands in stance , seated with combing hair    UE Bathing  UE Bathing Level of Assistance: Setup  UE Bathing Where Assessed: Shower (seated shower chair)  UE Bathing Comments: Pt declined    LE Bathing  LE Bathing Level of Assistance: Minimum assistance  LE Bathing  Where Assessed: Shower (seated shower chair)  LE Bathing Comments: Pt declined    UE Dressing  UE Dressing Level of Assistance: Modified independent  UE Dressing Where Assessed: Edge of bed  UE Dressing Comments: Pt doff/son pullover garment    LE Dressing  LE Dressing: Yes  LE Dressing Adaptive Equipment: Reacher, Sock aide  Pants Level of Assistance: Modified independent  Sock Level of Assistance: Modified independent  LE Dressing Where Assessed: Edge of bed  LE Dressing Comments: Pt doff/don pants /socks efficient use AE    Toileting  Toileting Level of Assistance: Modified independent  Where Assessed: Toilet  Toileting Comments: Pt adjusting clothing prior/after/colotomy care  Functional Standing Tolerance:  Time: 3 minutes iw ADL skills  Activity: clothing/grooming  Bed Mobility/Transfers: Bed Mobility  Bed Mobility: No  Bed Mobility 1  Bed Mobility 1: Supine to sitting  Level of Assistance 1: Independent  Bed Mobility Comments 1: bed to neutral  Bed Mobility 2  Bed Mobility  2: Supine to sitting  Level of Assistance 2: Independent  Bed Mobility Comments 2: bed to neutral    Transfers  Transfer: Yes  Transfer 1  Transfer From 1: Bed to  Transfer to 1: Stand  Technique 1: Sit to stand  Transfer Device 1: Walker (wheeled)  Transfer Level of Assistance 1: Distant supervision  Transfers 2  Transfer From 2: Stand to  Transfer to 2: Sit  Technique 2: Stand to sit  Transfer Device 2: Walker  Transfer Level of Assistance 2: Distant supervision  Trials/Comments 2: use RW cues hand placement    Toilet Transfers  Toilet Transfer From: Rolling walker  Toilet Transfer Type: To and from  Toilet Transfer to: Standard toilet  Toilet Transfer Technique: Ambulating  Toilet Transfers: Supervision  Toilet Transfers Comments: cues hand placement and controlled turns with RW       Shower Transfers  Shower Transfer From: Wheelchair  Shower Transfer Type: To and from  Shower Transfer to: Shower seat with back  Shower Transfer  Technique: Stand pivot  Shower Transfers: Supervision  Shower Transfers Comments: grab bar use     Therapy/Activity: Therapeutic Exercise  Therapeutic Exercise Performed: Yes  Therapeutic Exercise Activity 1: BUE 1# resistance 15 reps x 1 set seated  Therapeutic Exercise Activity 2: shoulder abduction/adduction  Therapeutic Exercise Activity 3: elbow flexion  Therapeutic Exercise Activity 4: forearm pronation/supination    Therapeutic Activity  Therapeutic Activity Performed: Yes  Therapeutic Activity 1: Functional mobility 15' x 2   Close S with turns and backing    Outcome Measures:[facility] Daily Activity  Putting on and taking off regular lower body clothing: None  Bathing (including washing, rinsing, drying): A little  Putting on and taking off regular upper body clothing: None  Toileting, which includes using toilet, bedpan or urinal: None  Taking care of personal grooming such as brushing teeth: None  Eating Meals: None  Daily Activity - Total Score: 23        Education Documentation  Body Mechanics, taught by ALLA Lopez at 11/16/2023 12:11 PM.  Learner: Patient  Readiness: Acceptance  Method: Demonstration, Teach-back  Response: Demonstrated Understanding    Precautions, taught by ALLA Lopez at 11/16/2023 12:11 PM.  Learner: Patient  Readiness: Acceptance  Method: Demonstration, Teach-back  Response: Demonstrated Understanding    ADL Training, taught by ALLA Lopez at 11/16/2023 12:11 PM.  Learner: Patient  Readiness: Acceptance  Method: Demonstration, Teach-back  Response: Demonstrated Understanding    Education Comments  No comments found.        OP EDUCATION:       Goals:  Encounter Problems       Encounter Problems (Active)       Balance       STG - Maintains dynamic standing balance with upper extremity support (Progressing)       Start:  10/27/23    Expected End:  11/21/23       INTERVENTIONS:  1. Practice standing with minimal support.  2. Educate patient about standing  tolerance.  3. Educate patient about independence with gait, transfers, and ADL's.  4. Educate patient about use of assistive device.  5. Educate patient about self-directed care.            Instrumental Activities of Daily Living       STG - Patient will maintain balance while making a bed (Progressing)       Start:  11/07/23    Expected End:  11/15/23            STG - Patient will use bilateral upper extremities to wash and dry dishes (Progressing)       Start:  11/07/23    Expected End:  11/15/23               Mobility       STG - Patient will ambulate 300 ft with FWW , + turns, distant supervision of 1. (Progressing)       Start:  10/27/23    Expected End:  11/21/23               Transfers       STG - Patient to transfer to and from sit to supine mod independent (Progressing)       Start:  10/27/23    Expected End:  11/21/23            STG - Patient will transfer sit to and from stand mod independent (Progressing)       Start:  10/27/23    Expected End:  11/21/23                  Encounter Problems (Resolved)       Balance       LTG - Patient will maintain standing and sitting balance to allow for completion of daily activities (Met)       Start:  10/16/23    Expected End:  11/13/23    Resolved:  11/07/23            Bathing       LTG - Patient will utilize adaptive techniques to bathe body (Met)       Start:  10/16/23    Expected End:  11/13/23    Resolved:  11/07/23            Dressings Lower Extremities       LTG - Patient will utilize adaptive techniques/equipment to dress lower body (Met)       Start:  10/16/23    Expected End:  11/13/23    Resolved:  11/07/23            Grooming       LTG - Patient will demonstrate use of appropriate Intervention for safe grooming habits (Met)       Start:  10/16/23    Expected End:  11/13/23    Resolved:  11/07/23            Mobility       LTG - Patient will ambulate household distance (Met)       Start:  10/16/23    Expected End:  11/13/23    Resolved:  11/07/23             Safety       LTG - Patient will demonstrate safety requirements appropriate to situation/environment (Met)       Start:  10/16/23    Expected End:  11/13/23    Resolved:  11/07/23                         no loss of consciousness, no gait abnormality, no headache, no sensory deficits, and no weakness.

## 2024-01-12 NOTE — ED PROVIDER NOTE - CHPI ED SYMPTOMS POS
Portions of this chart may have been created with voice recognition software. Occasional wrong-word or \"sound-alike\" substitutions may have occurred due to the inherent limitations of voice recognition software. Read the chart carefully and recognize, using context, where these substitutions have occurred        Chief Complaint     Mass (Crusty bump on right side of the scalp x month. Varies in size, sometimes it will shrink just to get bigger a few days later. Pt denies any pain or itching. ) and Orders (Discuss any pulmonary testing due to smoking hx)               SUBJECTIVE    Susana Nina is a 74 y.o. female here for Mass (Crusty bump on right side of the scalp x month. Varies in size, sometimes it will shrink just to get bigger a few days later. Pt denies any pain or itching. ) and Orders (Discuss any pulmonary testing due to smoking hx)           1. Abnormal skin growth  Patient has a crusty cystic lesion on her right side of her forehead that has been ongoing for several months however gotten bigger in the last few days she feels like.  She denies any pain any itching or any bleeding or open wounds.  She has not contacted her dermatologist yet.  Recommended patient to contact dermatologist for further evaluation and management this time.    2. Vertigo  Has been experiencing the vertigo symptoms on and off in the last few weeks.  It is improving however recommended to take over-the-counter meclizine as needed for symptoms and call back if symptoms    3. Former smoker  Former smoker with a 30-year pack year history would like lung cancer screening.  Occasionally has some shortness of breath and persistent cough.  - CT Lung Screen (Initial or Annual); Future    4. Stress and adjustment reaction  Patient is undergoing some stress adjustment with family dynamics.  She is managing it better however cannot get occasionally overwhelmed.  Recommended to take hydroxyzine as needed for symptoms of anxiety and she 
BODY ACHES/CHILLS/CONGESTION/COUGH/DIARRHEA/FEVER/HEADACHE/PAIN

## 2024-05-11 ENCOUNTER — NON-APPOINTMENT (OUTPATIENT)
Age: 34
End: 2024-05-11

## 2024-08-27 ENCOUNTER — INPATIENT (INPATIENT)
Facility: HOSPITAL | Age: 34
LOS: 1 days | Discharge: HOME | DRG: 195 | End: 2024-08-29
Attending: HOSPITALIST | Admitting: HOSPITALIST
Payer: COMMERCIAL

## 2024-08-27 VITALS
DIASTOLIC BLOOD PRESSURE: 88 MMHG | OXYGEN SATURATION: 93 % | RESPIRATION RATE: 20 BRPM | HEART RATE: 110 BPM | HEIGHT: 73 IN | TEMPERATURE: 100 F | SYSTOLIC BLOOD PRESSURE: 141 MMHG | WEIGHT: 255.07 LBS

## 2024-08-27 DIAGNOSIS — J18.9 PNEUMONIA, UNSPECIFIED ORGANISM: ICD-10-CM

## 2024-08-27 LAB
ALBUMIN SERPL ELPH-MCNC: 3.8 G/DL — SIGNIFICANT CHANGE UP (ref 3.5–5)
ALP SERPL-CCNC: 90 U/L — SIGNIFICANT CHANGE UP (ref 40–120)
ALT FLD-CCNC: 48 U/L DA — SIGNIFICANT CHANGE UP (ref 10–60)
ANION GAP SERPL CALC-SCNC: 9 MMOL/L — SIGNIFICANT CHANGE UP (ref 5–17)
APTT BLD: 33.8 SEC — SIGNIFICANT CHANGE UP (ref 24.5–35.6)
AST SERPL-CCNC: 27 U/L — SIGNIFICANT CHANGE UP (ref 10–40)
BASOPHILS # BLD AUTO: 0.06 K/UL — SIGNIFICANT CHANGE UP (ref 0–0.2)
BASOPHILS NFR BLD AUTO: 0.7 % — SIGNIFICANT CHANGE UP (ref 0–2)
BILIRUB SERPL-MCNC: 1.1 MG/DL — SIGNIFICANT CHANGE UP (ref 0.2–1.2)
BUN SERPL-MCNC: 11 MG/DL — SIGNIFICANT CHANGE UP (ref 7–18)
CALCIUM SERPL-MCNC: 10.1 MG/DL — SIGNIFICANT CHANGE UP (ref 8.4–10.5)
CHLORIDE SERPL-SCNC: 103 MMOL/L — SIGNIFICANT CHANGE UP (ref 96–108)
CO2 SERPL-SCNC: 24 MMOL/L — SIGNIFICANT CHANGE UP (ref 22–31)
CREAT SERPL-MCNC: 0.98 MG/DL — SIGNIFICANT CHANGE UP (ref 0.5–1.3)
EGFR: 104 ML/MIN/1.73M2 — SIGNIFICANT CHANGE UP
EOSINOPHIL # BLD AUTO: 0.17 K/UL — SIGNIFICANT CHANGE UP (ref 0–0.5)
EOSINOPHIL NFR BLD AUTO: 2.1 % — SIGNIFICANT CHANGE UP (ref 0–6)
FLUAV AG NPH QL: SIGNIFICANT CHANGE UP
FLUBV AG NPH QL: SIGNIFICANT CHANGE UP
GLUCOSE SERPL-MCNC: 129 MG/DL — HIGH (ref 70–99)
HCT VFR BLD CALC: 42.8 % — SIGNIFICANT CHANGE UP (ref 39–50)
HGB BLD-MCNC: 14.6 G/DL — SIGNIFICANT CHANGE UP (ref 13–17)
IMM GRANULOCYTES NFR BLD AUTO: 0.4 % — SIGNIFICANT CHANGE UP (ref 0–0.9)
INR BLD: 1.16 RATIO — SIGNIFICANT CHANGE UP (ref 0.85–1.18)
LACTATE SERPL-SCNC: 1.2 MMOL/L — SIGNIFICANT CHANGE UP (ref 0.7–2)
LYMPHOCYTES # BLD AUTO: 2.07 K/UL — SIGNIFICANT CHANGE UP (ref 1–3.3)
LYMPHOCYTES # BLD AUTO: 25.4 % — SIGNIFICANT CHANGE UP (ref 13–44)
MCHC RBC-ENTMCNC: 28.7 PG — SIGNIFICANT CHANGE UP (ref 27–34)
MCHC RBC-ENTMCNC: 34.1 GM/DL — SIGNIFICANT CHANGE UP (ref 32–36)
MCV RBC AUTO: 84.1 FL — SIGNIFICANT CHANGE UP (ref 80–100)
MONOCYTES # BLD AUTO: 0.95 K/UL — HIGH (ref 0–0.9)
MONOCYTES NFR BLD AUTO: 11.7 % — SIGNIFICANT CHANGE UP (ref 2–14)
NEUTROPHILS # BLD AUTO: 4.87 K/UL — SIGNIFICANT CHANGE UP (ref 1.8–7.4)
NEUTROPHILS NFR BLD AUTO: 59.7 % — SIGNIFICANT CHANGE UP (ref 43–77)
NRBC # BLD: 0 /100 WBCS — SIGNIFICANT CHANGE UP (ref 0–0)
PLATELET # BLD AUTO: 256 K/UL — SIGNIFICANT CHANGE UP (ref 150–400)
POTASSIUM SERPL-MCNC: 3.4 MMOL/L — LOW (ref 3.5–5.3)
POTASSIUM SERPL-SCNC: 3.4 MMOL/L — LOW (ref 3.5–5.3)
PROT SERPL-MCNC: 8.4 G/DL — HIGH (ref 6–8.3)
PROTHROM AB SERPL-ACNC: 13.2 SEC — HIGH (ref 9.5–13)
RBC # BLD: 5.09 M/UL — SIGNIFICANT CHANGE UP (ref 4.2–5.8)
RBC # FLD: 13.3 % — SIGNIFICANT CHANGE UP (ref 10.3–14.5)
SARS-COV-2 RNA SPEC QL NAA+PROBE: SIGNIFICANT CHANGE UP
SODIUM SERPL-SCNC: 136 MMOL/L — SIGNIFICANT CHANGE UP (ref 135–145)
WBC # BLD: 8.15 K/UL — SIGNIFICANT CHANGE UP (ref 3.8–10.5)
WBC # FLD AUTO: 8.15 K/UL — SIGNIFICANT CHANGE UP (ref 3.8–10.5)

## 2024-08-27 PROCEDURE — 71045 X-RAY EXAM CHEST 1 VIEW: CPT | Mod: 26

## 2024-08-27 PROCEDURE — 99285 EMERGENCY DEPT VISIT HI MDM: CPT

## 2024-08-27 PROCEDURE — 71275 CT ANGIOGRAPHY CHEST: CPT | Mod: 26,MC

## 2024-08-27 PROCEDURE — 99222 1ST HOSP IP/OBS MODERATE 55: CPT | Mod: GC

## 2024-08-27 RX ORDER — DEXAMETHASONE 0.75 MG
6 TABLET ORAL ONCE
Refills: 0 | Status: COMPLETED | OUTPATIENT
Start: 2024-08-27 | End: 2024-08-27

## 2024-08-27 RX ORDER — IPRATROPIUM BROMIDE AND ALBUTEROL SULFATE .5; 3 MG/3ML; MG/3ML
3 SOLUTION RESPIRATORY (INHALATION) ONCE
Refills: 0 | Status: COMPLETED | OUTPATIENT
Start: 2024-08-27 | End: 2024-08-27

## 2024-08-27 RX ORDER — SODIUM CHLORIDE 9 MG/ML
3600 INJECTION INTRAMUSCULAR; INTRAVENOUS; SUBCUTANEOUS ONCE
Refills: 0 | Status: COMPLETED | OUTPATIENT
Start: 2024-08-27 | End: 2024-08-27

## 2024-08-27 RX ORDER — ONDANSETRON 2 MG/ML
4 INJECTION, SOLUTION INTRAMUSCULAR; INTRAVENOUS ONCE
Refills: 0 | Status: COMPLETED | OUTPATIENT
Start: 2024-08-27 | End: 2024-08-27

## 2024-08-27 RX ADMIN — SODIUM CHLORIDE 3600 MILLILITER(S): 9 INJECTION INTRAMUSCULAR; INTRAVENOUS; SUBCUTANEOUS at 23:32

## 2024-08-27 RX ADMIN — ONDANSETRON 4 MILLIGRAM(S): 2 INJECTION, SOLUTION INTRAMUSCULAR; INTRAVENOUS at 23:02

## 2024-08-27 RX ADMIN — IPRATROPIUM BROMIDE AND ALBUTEROL SULFATE 3 MILLILITER(S): .5; 3 SOLUTION RESPIRATORY (INHALATION) at 23:04

## 2024-08-27 RX ADMIN — Medication 6 MILLIGRAM(S): at 23:02

## 2024-08-27 RX ADMIN — SODIUM CHLORIDE 3600 MILLILITER(S): 9 INJECTION INTRAMUSCULAR; INTRAVENOUS; SUBCUTANEOUS at 21:10

## 2024-08-27 NOTE — ED ADULT TRIAGE NOTE - CHIEF COMPLAINT QUOTE
Pt was seen at urgent on Sunday for c/o shortness of breathe, cough and fever and low oxygen was referred to ER, was given meds but it is not working had a fever 102.9F at 4:30pm took Tylenol c/o shortness of breathe, cough , fever and body ache Pt was seen at urgent on Sunday for c/o shortness of breathe, cough and fever and low oxygen was referred to ER, was given meds but it is not working had a fever 102.9F at 4:30pm took Tylenol c/o shortness of breathe, cough , fever and body ache. Pt has history PE.

## 2024-08-27 NOTE — ED ADULT NURSE NOTE - CHIEF COMPLAINT QUOTE
Pt was seen at urgent on Sunday for c/o shortness of breathe, cough and fever and low oxygen was referred to ER, was given meds but it is not working had a fever 102.9F at 4:30pm took Tylenol c/o shortness of breathe, cough , fever and body ache. Pt has history PE.

## 2024-08-27 NOTE — H&P ADULT - HISTORY OF PRESENT ILLNESS
Patient is a 33y M, PMHx of PE 2/2 covid. Presented to the ED with c/o shortness of breath, cough, fevers, night sweats, pleuritic chest pain starting sunday. He went to the urgent care on sunday when the symptoms started and was prescribed levoquin which he took for 2 days and stopped after noticing worsening of his symptoms. His chest pain is diffuse and pleuritic in nature it occurs on coughing. He used to be able to walk without experiencing SOB however since symptoms have started he is currently able to walk 2-3 blocks. Since 2 days ago he started having fevers w/ chills at home Tmax of 103.2 he tried tylenol and motrin however they did not keep his fevers down. Starting yesterday he he started having very loose diarrhea, non bloody, about 4 episodes yesterday (this was a day after stopping levoquin), denies abddominal pain however notes decreased appetite.   His daughter and wife were both sick, daughter had an URTI and wife had viral conjunctivitis last week.

## 2024-08-27 NOTE — H&P ADULT - ASSESSMENT
Patient is a 33yM w/ PMHx of PE 2/2 covid. Came to the ED due to sob, fever w/ chills, dry cough and pleuritis chest pain since 3 days ago with new onset of watery diarrhea. In the ED vs: afeb, tachycardic to 90s, RR 24 sat 93-95% on RA. wbc wnl, CTa chest negative for PE, shows tree in bud appearance and ground glass opacities in bilateral lung fields more in Left > right. Patient is admitted for sepsis 2/2 PNA and work up diarrhea cause

## 2024-08-27 NOTE — ED PROVIDER NOTE - CLINICAL SUMMARY MEDICAL DECISION MAKING FREE TEXT BOX
33-year-old male states that on Sunday he experienced shortness of breath chest pain and cough and went to the urgent care center and he was told that he had a pneumonia was started on Levaquin and was told to come to the emergency department because he has a history of a PE and he was sat  at 93%.    Patient states that his cough is persistent that he does feel short of breath and he has nausea.  Patient also complains of diarrhea.    Patient is mildly tachycardic and has a low-grade temperature but took Tylenol prior to coming to the emergency department.  Patient is not in acute distress but is actively coughing.  Lungs are clear breath sounds are equal bilaterally no rales appreciated.  Cardiac exam S1-S2 within normal limits tachycardia.  Abdomen is soft nontender.    Saturation in the ED is 93% respiratory rate is 26  -30    plan.  Sepsis workup will add dose of antibiotics.  CT angio of the chest to rule out PE.  COVID test is negative.  Patient is likely to be admitted. 33-year-old male states that on Sunday he experienced shortness of breath chest pain and cough and went to the urgent care center and he was told that he had a pneumonia was started on Levaquin and was told to come to the emergency department because he has a history of a PE and he was sat  at 93%.    Patient states that his cough is persistent that he does feel short of breath and he has nausea.  Patient also complains of diarrhea.    Patient is mildly tachycardic and has a low-grade temperature but took Tylenol prior to coming to the emergency department.  Patient is not in acute distress but is actively coughing.  Lungs are clear breath sounds are equal bilaterally no rales appreciated.  Cardiac exam S1-S2 within normal limits tachycardia.  Abdomen is soft nontender.    Saturation in the ED is 93% respiratory rate is 26  -30    plan.  Sepsis workup will add dose of antibiotics.  CT angio of the chest to rule out PE.  COVID test is negative.  Patient is likely to be admitted. pt took abx today

## 2024-08-27 NOTE — H&P ADULT - PROBLEM SELECTOR PLAN 4
k: 3.4   likely 2/2 diarrhea losses vs iatrogenic 2/2 IVF bolus  repleted with 40 kcl given pt endorses diarrhea   f/u w/ am lab

## 2024-08-27 NOTE — H&P ADULT - PROBLEM SELECTOR PLAN 3
hx of diarrhea for watery abour 4 episodes yesterday w/ fever and chills  hx of recent levaquin use since sunday for 2 days did not finish the course  to rule out c.diff  isolation till c. diff ruled out  f/u stool cultures, ova parasite, gi pcr  diet: regular

## 2024-08-27 NOTE — H&P ADULT - PROBLEM SELECTOR PLAN 2
as above  dd: legionaires (LRTI and diarrhea) vs other bacterial PNA vs viral PNA  tylenol prn for fever and pain  mucinex 1200 BID for cough  c/w ctz and azithryomycin   acceptable saturation 93-95% on RA   monitor saturation and if needed consider supplimental oxygen  f/u atypical pna panel  f/u procal  f/u blood and sputum culture (cough currently dry)  f/u rvp

## 2024-08-27 NOTE — H&P ADULT - NSHPREVIEWOFSYSTEMS_GEN_ALL_CORE
T(C): 36.9 (08-28-24 @ 03:05), Max: 37.8 (08-27-24 @ 20:04)  HR: 87 (08-28-24 @ 03:05) (87 - 110)  BP: 115/72 (08-28-24 @ 03:05) (115/72 - 141/88)  RR: 24 (08-28-24 @ 03:05) (20 - 24)  SpO2: 94% (08-28-24 @ 03:05) (93% - 94%)    REVIEW OF SYSTEMS:  CONSTITUTIONAL: No weakness. + fevers or chills  EYES/ENT: No visual changes;  No vertigo or throat pain   NECK: No pain or stiffness  RESPIRATORY: + cough. No  wheezing, hemoptysis; + shortness of breath  CARDIOVASCULAR: + chest pain pleuritis. no palpitations  GASTROINTESTINAL: No abdominal or epigastric pain. No nausea, vomiting, or hematemesis; + diarrhea. no constipation. No melena or hematochezia.  GENITOURINARY: No dysuria, frequency or hematuria  NEUROLOGICAL: No numbness or weakness  SKIN: No itching, rashes

## 2024-08-27 NOTE — H&P ADULT - ATTENDING COMMENTS
IMAGING  CTA Chest  IMPRESSION:  1. Limited evaluation of the distal subsegmental pulmonary arteries secondary to motion artifact. No pulmonary embolism in the main, lobar, segmental or proximal subsegmental pulmonary arteries.  2. Scattered branching "tree in bud" and groundglass nodular opacities throughout the left upper, lingula and lower lobes and, to a lesser extent, in the right lower lobe likely infectious in etiology.    Chest X-Ray  Pending official read; on my read there is bilateral lower lobe infiltrates, no effusion.    EKG  Normal Sinus Rhythm, 87 bpm, QTc 421ms, CA 154ms, on my read no ST segment elevation or depression, no TWI    HPI  33 year old male patient with pmhx Colonic Polyp who presented to the ER due to worsening shortness of breath.  The patient's daughter was sick and then his wife, so he thinks he got the pneumonia from them. He denies any recent travel history and no recent visits to unusual places such as prisons. He works in food business. He has been coughing with yellow sputum production and has fever, chills, night sweats, dyspnea on exertion, and has not been eating due to poor appetite. 93% O2 saturation on room air. He took Levaquin on Monday and Tuesday (2 doses total) but did not improve so he came to the ER.    Review Of Systems included: + shortness of breath, + dyspnea on exertion, + cough, + yellow sputum production, + fever, + chills, + night sweats, + nausea, + vomiting, + poor oral intake, + loss of appetite, + watery diarrhea 3 times per day for 2 days,   No weight loss, no recent travel, no recent visits to prisons or unusual places, no dysuria, no constipation    Physical Exam  General: Awake, Alert, Oriented  Cardiac: Tachycardic, Regular Rhythm  Pulmonary: Tachypneic, Crackles at bases, no increased work of breathing  Abdominal: Soft, ND, NT  Extremities: No edema b/l    A/P  # Sepsis  # Pneumonia  > SIRS Positive: Tachycardic 110 bpm, 24 breath per minute  - IV Ceftriaxone  - IV Azithromycin  - Check Legionella/Strep Urine Antigen  - Check Sputum Culture  - Check Mycoplasma IgM Antibody  - Follow up blood cultures  - IV Fluid Hydration  - Tylenol prn    # Diarrhea  Likely 2/2 antibiotic use    # DVT PPx  - Lovenox    # FEN  - Regular Diet  - Monitor and replete electrolytes as needed    Previous Admissions Included  5/13/2022: GI Clinic Visit: was having frequent bowel movements as well as dyspepsia and was given both mesalamine as well as a proton pump inhibitor. was doing quite well for a period of time but after the medication ran out he developed recurrence of the symptoms and in fact was hospitalized in [place] and underwent a repeat endoscopy and colonoscopy. The patient was told he had gastric polyps and possible colitis  12/17/2020 - 12/26/2020: Colitis, COVID  12/11/2020: ER Visit for Viral illness  4/1/2018: ER Visit for shoulder contusion after a fall    Patient case and management was discussed with ER Attending  I did examine all labs (including CBC, PT/INR, APTT, CMP, Lactate, COVID/Flu Test), imaging, prior notes

## 2024-08-27 NOTE — ED ADULT TRIAGE NOTE - BMI (KG/M2)
Progress Notes by Linda Mcneal APN at 08/10/17 04:34 PM     Author:  Linda Mcneal APN Service:  (none) Author Type:  Nurse Practitioner     Filed:  08/10/17 04:53 PM Encounter Date:  8/10/2017 Status:  Signed     :  Linda Mcneal APN (Nurse Practitioner)            Still with some nausea. No vomiting. Denies cramping or bleeding. Declines genetic testing.[SS1.1M] Discussed bleeding, LOF, cramping and contractions. She repeated the information and states she understands when to call.[SS1.2T]/SS[SS1.2M]      Revision History        User Key Date/Time User Provider Type Action    > SS1.2 08/10/17 04:53 PM Linda Mcneal APN Nurse Practitioner Sign     SS1.1 08/10/17 04:34 PM Linda Mcneal APN Nurse Practitioner     M - Manual, T - Template             33.7

## 2024-08-27 NOTE — ED PROVIDER NOTE - OBJECTIVE STATEMENT
33-year-old male states that on Sunday he experienced shortness of breath chest pain and cough and went to the urgent care center and he was told that he had a pneumonia was started on Levaquin and was told to come to the emergency department because he has a history of a PE and he was sat  at 93%.    Patient states that his cough is persistent that he does feel short of breath and he has nausea.

## 2024-08-27 NOTE — H&P ADULT - NSHPPHYSICALEXAM_GEN_ALL_CORE
PHYSICAL EXAM:  GENERAL: NAD, speaks in full sentences, no signs of respiratory distress  HEAD:  Atraumatic, Normocephalic  EYES: EOMI, conjunctiva and sclera clear  NECK: Supple, No JVD  CHEST/LUNG: Clear to auscultation bilaterally except decreased breath sound in the bilateral lower lobes; No wheeze; No crackles; No accessory muscles used  HEART: Regular rate and rhythm; No murmurs;   ABDOMEN: Soft, Nontender, Nondistended; Bowel sounds present; No guarding  EXTREMITIES:  2+ Peripheral Pulses, No cyanosis or edema  PSYCH: AAOx3  NEUROLOGY: non-focal  SKIN: No rashes or lesions

## 2024-08-27 NOTE — H&P ADULT - PROBLEM SELECTOR PLAN 1
p/w hx of fevers w/ chills, dry cough and pleuritis chest pain since 3 days ago with new onset of watery diarrhea.   in ED afebrile, HR >90, normotensive, RR 24, saturating 93-95% on RA, lactate 1.2   received 3.6L IVF bolus and CTX and levaquin in the ED  UA negative   CT chest: tree in bud appearance and b/l ground glass opacities suggestive of infectious cause.  covid and flu negative   c/w ctz and azithromycin  f/u blood cultures, sputum cultures  f/u rvp   tylenol prn

## 2024-08-28 DIAGNOSIS — Z29.9 ENCOUNTER FOR PROPHYLACTIC MEASURES, UNSPECIFIED: ICD-10-CM

## 2024-08-28 DIAGNOSIS — Z75.8 OTHER PROBLEMS RELATED TO MEDICAL FACILITIES AND OTHER HEALTH CARE: ICD-10-CM

## 2024-08-28 DIAGNOSIS — A41.9 SEPSIS, UNSPECIFIED ORGANISM: ICD-10-CM

## 2024-08-28 DIAGNOSIS — R19.7 DIARRHEA, UNSPECIFIED: ICD-10-CM

## 2024-08-28 DIAGNOSIS — J18.9 PNEUMONIA, UNSPECIFIED ORGANISM: ICD-10-CM

## 2024-08-28 DIAGNOSIS — E87.6 HYPOKALEMIA: ICD-10-CM

## 2024-08-28 LAB
ANION GAP SERPL CALC-SCNC: 8 MMOL/L — SIGNIFICANT CHANGE UP (ref 5–17)
APPEARANCE UR: CLEAR — SIGNIFICANT CHANGE UP
BILIRUB UR-MCNC: NEGATIVE — SIGNIFICANT CHANGE UP
BUN SERPL-MCNC: 10 MG/DL — SIGNIFICANT CHANGE UP (ref 7–18)
C DIFF GDH STL QL: NEGATIVE — SIGNIFICANT CHANGE UP
C DIFF GDH STL QL: SIGNIFICANT CHANGE UP
CALCIUM SERPL-MCNC: 9.4 MG/DL — SIGNIFICANT CHANGE UP (ref 8.4–10.5)
CHLORIDE SERPL-SCNC: 107 MMOL/L — SIGNIFICANT CHANGE UP (ref 96–108)
CO2 SERPL-SCNC: 23 MMOL/L — SIGNIFICANT CHANGE UP (ref 22–31)
COLOR SPEC: YELLOW — SIGNIFICANT CHANGE UP
CREAT SERPL-MCNC: 0.89 MG/DL — SIGNIFICANT CHANGE UP (ref 0.5–1.3)
CRP SERPL-MCNC: 135 MG/L — HIGH
DIFF PNL FLD: NEGATIVE — SIGNIFICANT CHANGE UP
EGFR: 116 ML/MIN/1.73M2 — SIGNIFICANT CHANGE UP
ERYTHROCYTE [SEDIMENTATION RATE] IN BLOOD: 66 MM/HR — HIGH (ref 0–15)
GI PCR PANEL: SIGNIFICANT CHANGE UP
GLUCOSE SERPL-MCNC: 225 MG/DL — HIGH (ref 70–99)
GLUCOSE UR QL: NEGATIVE MG/DL — SIGNIFICANT CHANGE UP
HCT VFR BLD CALC: 39.1 % — SIGNIFICANT CHANGE UP (ref 39–50)
HGB BLD-MCNC: 13.4 G/DL — SIGNIFICANT CHANGE UP (ref 13–17)
KETONES UR-MCNC: 80 MG/DL
LEUKOCYTE ESTERASE UR-ACNC: NEGATIVE — SIGNIFICANT CHANGE UP
M PNEUMO IGM SER-ACNC: 0.84 INDEX — SIGNIFICANT CHANGE UP (ref 0–0.9)
MAGNESIUM SERPL-MCNC: 2.4 MG/DL — SIGNIFICANT CHANGE UP (ref 1.6–2.6)
MCHC RBC-ENTMCNC: 29.2 PG — SIGNIFICANT CHANGE UP (ref 27–34)
MCHC RBC-ENTMCNC: 34.3 GM/DL — SIGNIFICANT CHANGE UP (ref 32–36)
MCV RBC AUTO: 85.2 FL — SIGNIFICANT CHANGE UP (ref 80–100)
MYCOPLASMA AG SPEC QL: NEGATIVE — SIGNIFICANT CHANGE UP
NITRITE UR-MCNC: NEGATIVE — SIGNIFICANT CHANGE UP
NRBC # BLD: 0 /100 WBCS — SIGNIFICANT CHANGE UP (ref 0–0)
PH UR: 6.5 — SIGNIFICANT CHANGE UP (ref 5–8)
PHOSPHATE SERPL-MCNC: 2.6 MG/DL — SIGNIFICANT CHANGE UP (ref 2.5–4.5)
PLATELET # BLD AUTO: 232 K/UL — SIGNIFICANT CHANGE UP (ref 150–400)
POTASSIUM SERPL-MCNC: 4.6 MMOL/L — SIGNIFICANT CHANGE UP (ref 3.5–5.3)
POTASSIUM SERPL-SCNC: 4.6 MMOL/L — SIGNIFICANT CHANGE UP (ref 3.5–5.3)
PROCALCITONIN SERPL-MCNC: 0.42 NG/ML — HIGH (ref 0.02–0.1)
PROT UR-MCNC: NEGATIVE MG/DL — SIGNIFICANT CHANGE UP
RAPID RVP RESULT: SIGNIFICANT CHANGE UP
RBC # BLD: 4.59 M/UL — SIGNIFICANT CHANGE UP (ref 4.2–5.8)
RBC # FLD: 13.5 % — SIGNIFICANT CHANGE UP (ref 10.3–14.5)
SARS-COV-2 RNA SPEC QL NAA+PROBE: SIGNIFICANT CHANGE UP
SODIUM SERPL-SCNC: 138 MMOL/L — SIGNIFICANT CHANGE UP (ref 135–145)
SP GR SPEC: 1.02 — SIGNIFICANT CHANGE UP (ref 1–1.03)
UROBILINOGEN FLD QL: 1 MG/DL — SIGNIFICANT CHANGE UP (ref 0.2–1)
WBC # BLD: 6.32 K/UL — SIGNIFICANT CHANGE UP (ref 3.8–10.5)
WBC # FLD AUTO: 6.32 K/UL — SIGNIFICANT CHANGE UP (ref 3.8–10.5)

## 2024-08-28 PROCEDURE — 99223 1ST HOSP IP/OBS HIGH 75: CPT

## 2024-08-28 PROCEDURE — 99233 SBSQ HOSP IP/OBS HIGH 50: CPT

## 2024-08-28 PROCEDURE — 99222 1ST HOSP IP/OBS MODERATE 55: CPT | Mod: GC

## 2024-08-28 RX ORDER — POTASSIUM CHLORIDE 10 MEQ
40 TABLET, EXT RELEASE, PARTICLES/CRYSTALS ORAL ONCE
Refills: 0 | Status: COMPLETED | OUTPATIENT
Start: 2024-08-28 | End: 2024-08-28

## 2024-08-28 RX ORDER — GUAIFENESIN 100 MG/5ML
100 LIQUID ORAL ONCE
Refills: 0 | Status: COMPLETED | OUTPATIENT
Start: 2024-08-28 | End: 2024-08-28

## 2024-08-28 RX ORDER — AZITHROMYCIN 500 MG/1
TABLET, FILM COATED ORAL
Refills: 0 | Status: DISCONTINUED | OUTPATIENT
Start: 2024-08-28 | End: 2024-08-28

## 2024-08-28 RX ORDER — AZITHROMYCIN 500 MG/1
500 TABLET, FILM COATED ORAL DAILY
Refills: 0 | Status: DISCONTINUED | OUTPATIENT
Start: 2024-08-28 | End: 2024-08-29

## 2024-08-28 RX ORDER — GUAIFENESIN 100 MG/5ML
1200 LIQUID ORAL EVERY 12 HOURS
Refills: 0 | Status: DISCONTINUED | OUTPATIENT
Start: 2024-08-28 | End: 2024-08-29

## 2024-08-28 RX ORDER — ONDANSETRON 2 MG/ML
4 INJECTION, SOLUTION INTRAMUSCULAR; INTRAVENOUS EVERY 8 HOURS
Refills: 0 | Status: DISCONTINUED | OUTPATIENT
Start: 2024-08-28 | End: 2024-08-29

## 2024-08-28 RX ORDER — MAGNESIUM, ALUMINUM HYDROXIDE 200-225/5
30 SUSPENSION, ORAL (FINAL DOSE FORM) ORAL EVERY 4 HOURS
Refills: 0 | Status: DISCONTINUED | OUTPATIENT
Start: 2024-08-28 | End: 2024-08-29

## 2024-08-28 RX ORDER — IPRATROPIUM BROMIDE AND ALBUTEROL SULFATE .5; 3 MG/3ML; MG/3ML
3 SOLUTION RESPIRATORY (INHALATION) EVERY 6 HOURS
Refills: 0 | Status: DISCONTINUED | OUTPATIENT
Start: 2024-08-28 | End: 2024-08-29

## 2024-08-28 RX ORDER — AZITHROMYCIN 500 MG/1
500 TABLET, FILM COATED ORAL ONCE
Refills: 0 | Status: COMPLETED | OUTPATIENT
Start: 2024-08-28 | End: 2024-08-28

## 2024-08-28 RX ORDER — SODIUM CHLORIDE 9 MG/ML
1000 INJECTION INTRAMUSCULAR; INTRAVENOUS; SUBCUTANEOUS
Refills: 0 | Status: DISCONTINUED | OUTPATIENT
Start: 2024-08-28 | End: 2024-08-29

## 2024-08-28 RX ORDER — ACETAMINOPHEN 325 MG/1
650 TABLET ORAL EVERY 6 HOURS
Refills: 0 | Status: DISCONTINUED | OUTPATIENT
Start: 2024-08-28 | End: 2024-08-29

## 2024-08-28 RX ORDER — ENOXAPARIN SODIUM 100 MG/ML
40 INJECTION SUBCUTANEOUS EVERY 24 HOURS
Refills: 0 | Status: DISCONTINUED | OUTPATIENT
Start: 2024-08-28 | End: 2024-08-29

## 2024-08-28 RX ADMIN — Medication 100 MILLIGRAM(S): at 05:49

## 2024-08-28 RX ADMIN — GUAIFENESIN 1200 MILLIGRAM(S): 100 LIQUID ORAL at 17:33

## 2024-08-28 RX ADMIN — Medication 3 MILLIGRAM(S): at 23:57

## 2024-08-28 RX ADMIN — AZITHROMYCIN 500 MILLIGRAM(S): 500 TABLET, FILM COATED ORAL at 17:33

## 2024-08-28 RX ADMIN — ENOXAPARIN SODIUM 40 MILLIGRAM(S): 100 INJECTION SUBCUTANEOUS at 05:51

## 2024-08-28 RX ADMIN — Medication 30 MILLILITER(S): at 06:29

## 2024-08-28 RX ADMIN — AZITHROMYCIN 255 MILLIGRAM(S): 500 TABLET, FILM COATED ORAL at 06:05

## 2024-08-28 RX ADMIN — GUAIFENESIN 100 MILLIGRAM(S): 100 LIQUID ORAL at 21:33

## 2024-08-28 RX ADMIN — IPRATROPIUM BROMIDE AND ALBUTEROL SULFATE 3 MILLILITER(S): .5; 3 SOLUTION RESPIRATORY (INHALATION) at 14:35

## 2024-08-28 RX ADMIN — GUAIFENESIN 1200 MILLIGRAM(S): 100 LIQUID ORAL at 05:51

## 2024-08-28 RX ADMIN — Medication 40 MILLIEQUIVALENT(S): at 05:51

## 2024-08-28 RX ADMIN — ONDANSETRON 4 MILLIGRAM(S): 2 INJECTION, SOLUTION INTRAMUSCULAR; INTRAVENOUS at 06:30

## 2024-08-28 RX ADMIN — SODIUM CHLORIDE 75 MILLILITER(S): 9 INJECTION INTRAMUSCULAR; INTRAVENOUS; SUBCUTANEOUS at 15:01

## 2024-08-28 RX ADMIN — ONDANSETRON 4 MILLIGRAM(S): 2 INJECTION, SOLUTION INTRAMUSCULAR; INTRAVENOUS at 15:01

## 2024-08-28 RX ADMIN — Medication 30 MILLILITER(S): at 12:36

## 2024-08-28 RX ADMIN — ACETAMINOPHEN 650 MILLIGRAM(S): 325 TABLET ORAL at 21:24

## 2024-08-28 RX ADMIN — ACETAMINOPHEN 650 MILLIGRAM(S): 325 TABLET ORAL at 20:47

## 2024-08-28 RX ADMIN — IPRATROPIUM BROMIDE AND ALBUTEROL SULFATE 3 MILLILITER(S): .5; 3 SOLUTION RESPIRATORY (INHALATION) at 20:00

## 2024-08-28 RX ADMIN — ONDANSETRON 4 MILLIGRAM(S): 2 INJECTION, SOLUTION INTRAMUSCULAR; INTRAVENOUS at 23:57

## 2024-08-28 NOTE — CONSULT NOTE ADULT - ATTENDING COMMENTS
Chart reviewed, including notes/labs/imaging. Discussed the Dx and management of sepsis with the intern on ID and agree with the Hx, PE, assessment and plan as described above.

## 2024-08-28 NOTE — DIETITIAN INITIAL EVALUATION ADULT - LITERATURE/VIDEOS GIVEN
Encouraged pt to continue eating as able. Emphasized the importance of eating adequate protein/calorie through PO meal intake/PO supplement intake.

## 2024-08-28 NOTE — PROGRESS NOTE ADULT - PROBLEM SELECTOR PLAN 1
2/2 PNA  Pt presented with SOB, cough, fever, and pleuritic CP. Tachycardic on admission with temp 100F, lactate 1.2 s/p 3.5L IVF bolus and CTX and levaquin in ED. UA negative  CXR negative, CTA chest with scattered branching "tree in bud" and groundglass nodular opacities L>R.  Covid, flu negative, RVP negative.   ESR 66.   No leukocytosis, afebrile now, VSS, on RA SpO2 95%, but having SOB. expiratory wheezing  - f/u procal  - f/u blood culture  - f/u sputum culture  - f/u atypical pna   - start duoneb Q6H  - continue mucinex 1200 mg Q12H  - continue azithromycin 500 mg daily  - continue ceftriaxone 1g daily  - ID Dr Singh consulted  - Pulm Dr Riddle consulted

## 2024-08-28 NOTE — DIETITIAN INITIAL EVALUATION ADULT - PERTINENT LABORATORY DATA
08-28    138  |  107  |  10  ----------------------------<  225<H>  4.6   |  23  |  0.89    Ca    9.4      28 Aug 2024 05:10  Phos  2.6     08-28  Mg     2.4     08-28    TPro  8.4<H>  /  Alb  3.8  /  TBili  1.1  /  DBili  x   /  AST  27  /  ALT  48  /  AlkPhos  90  08-27

## 2024-08-28 NOTE — PROVIDER CONTACT NOTE (CRITICAL VALUE NOTIFICATION) - TEST AND RESULT REPORTED:
8/25/24: blood culture final result growth in both anaerobic and aerobic bottles of Klebsiella Pneumonia ESBL

## 2024-08-28 NOTE — PROGRESS NOTE ADULT - ASSESSMENT
Mr. Oneil is a 33 year old man with pmhx Colonic Polyp who presented to the ER due to worsening shortness of breath and diarrhea.  93% O2 saturation on room air. He took Levaquin on Monday and Tuesday (2 doses total) but did not improve so he came to the ER. Admitted for sepsis 2/2 PNA.    # Sepsis  2/2 multifocal Pneumonia  -tachycardic 110 bpm, 24 breath per minute  - IV Ceftriaxone  - IV Azithromycin  - f/u Legionella/Strep Urine Antigen  - f/u Sputum Culture  - f/u Mycoplasma IgM Antibody  - Follow up blood cultures  -consult pulm, ID  - IV Fluid Hydration  - Tylenol prn    # Diarrhea  Likely 2/2 antibiotic use  -hx of IBS? will consult GI  -c diff negative    # DVT PPx  - Lovenox    # FEN  - Regular Diet  - Monitor and replete electrolytes as needed     Mr. Oneil is a 33 year old man with pmhx Colonic Polyp who presented to the ER due to worsening shortness of breath and diarrhea.  93% O2 saturation on room air. He took Levaquin on Monday and Tuesday (2 doses total) but did not improve so he came to the ER. Admitted for sepsis 2/2 PNA.    # Sepsis 2/2 multifocal Pneumonia  #SOB possibly 2/2 pneumonia versus residual symptoms from COVID  -tachycardic 110 bpm, 24 breath per minute  - IV Ceftriaxone  - IV Azithromycin  - f/u Legionella/Strep Urine Antigen  - f/u Sputum Culture  - f/u Mycoplasma IgM Antibody  - Follow up blood cultures  -consult pulm, ID  - IV Fluid Hydration  - Tylenol prn    # Diarrhea  Likely 2/2 antibiotic use  -hx of IBS? will consult GI  -c diff negative    # DVT PPx  - Lovenox    # FEN  - Regular Diet  - Monitor and replete electrolytes as needed

## 2024-08-28 NOTE — PHARMACOTHERAPY INTERVENTION NOTE - COMMENTS
Provided patient counseling on pt's new medications and side effects of those medications.    Also provided pt with printed information. Briefly went over inpatient medications as well. Answered pt's questions.

## 2024-08-28 NOTE — CONSULT NOTE ADULT - SUBJECTIVE AND OBJECTIVE BOX
INITIAL ADVANCE GI CONSULTATION    Patient is a 33y old  Male who presents with a chief complaint of Pneumonia due to infectious organism     (28 Aug 2024 10:19)    HPI:  Patient is a 33y M, PMHx of PE 2/2 covid. Presented to the ED with c/o shortness of breath, cough, fevers, night sweats, pleuritic chest pain starting sunday. He went to the urgent care on sunday when the symptoms started and was prescribed levoquin which he took for 2 days and stopped after noticing worsening of his symptoms. His chest pain is diffuse and pleuritic in nature it occurs on coughing. He used to be able to walk without experiencing SOB however since symptoms have started he is currently able to walk 2-3 blocks. Since 2 days ago he started having fevers w/ chills at home Tmax of 103.2 he tried tylenol and motrin however they did not keep his fevers down. Starting yesterday he he started having very loose diarrhea, non bloody, about 4 episodes yesterday (this was a day after stopping levoquin), denies abddominal pain however notes decreased appetite.   His daughter and wife were both sick, daughter had an URTI and wife had viral conjunctivitis last week.  (27 Aug 2024 23:33)      GI HPI :     Patient endorses he has had " GI issues " all his life which have worsened in the past couple of years, his last colitis flare was 10 m ago which was treated at [facility]. Patient reports most recent EGD/ Cx was about 1 year ago - endorses that there were no " major issues on the report"   Patient states he started to have watery diarrhea , 3-4 episodes per a day after starting ABx ( levaquin) this sunday for URTI which he stopped after 2 doses. Diarrhea has persisted, 3 episodes this morning. Patient is also nauseous, reports 2 episodes of NBNB vomiting yesterday, now resolved. Associated with fever and chills  Reports diffuse abdominal pain x 2 days - described as generalized discomfort, no relieving factors. Patient reports that he intermittently observes blood while wiping with toilet paper.   Patient reports that his clothes have become loose in the past few months     Of note - family Hx of stomach cancer in the maternal grand father     PMH/PSH:  PAST MEDICAL & SURGICAL HISTORY:  Pulmonary embolism      No significant past surgical history        FH:  FAMILY HISTORY:  No pertinent family history in first degree relatives        Social History:       MEDS:  MEDICATIONS  (STANDING):  albuterol/ipratropium for Nebulization 3 milliLiter(s) Nebulizer every 6 hours  azithromycin  IVPB      cefTRIAXone   IVPB 1000 milliGRAM(s) IV Intermittent every 24 hours  enoxaparin Injectable 40 milliGRAM(s) SubCutaneous every 24 hours  guaiFENesin ER 1200 milliGRAM(s) Oral every 12 hours    MEDICATIONS  (PRN):  acetaminophen     Tablet .. 650 milliGRAM(s) Oral every 6 hours PRN Temp greater or equal to 38C (100.4F), Mild Pain (1 - 3)  aluminum hydroxide/magnesium hydroxide/simethicone Suspension 30 milliLiter(s) Oral every 4 hours PRN Dyspepsia  melatonin 3 milliGRAM(s) Oral at bedtime PRN Insomnia  ondansetron Injectable 4 milliGRAM(s) IV Push every 8 hours PRN Nausea and/or Vomiting    Allergies    No Known Allergies    Intolerances            CONSTITUTIONAL:  +weight loss, +fever, + chills, No weakness or+ fatigue.  HEENT:  Eyes:  No visual loss, blurred vision, double vision or yellow sclerae. Ears, Nose, Throat:  No hearing loss, sneezing, congestion, runny nose or sore throat.  SKIN:  No rash or itching.  CARDIOVASCULAR:  No chest pain, chest pressure or chest discomfort. No palpitations or edema.  RESPIRATORY:  No shortness of breath, cough or sputum.  GASTROINTESTINAL:  SEE HPI  GENITOURINARY:  No dysuria, hematuria, urinary frequency  NEUROLOGICAL:  No headache, dizziness, syncope, paralysis, ataxia, numbness or tingling in the extremities. No change in bowel or bladder control.  MUSCULOSKELETAL:  No muscle, back pain, joint pain or stiffness.  HEMATOLOGIC:  No anemia, bleeding or bruising.  LYMPHATICS:  No enlarged nodes. No history of splenectomy.  PSYCHIATRIC:  No history of depression or anxiety.  ENDOCRINOLOGIC:  No reports of sweating, cold or heat intolerance. No polyuria or polydipsia.      ______________________________________________________________________  PHYSICAL EXAM:  T(C): 36.9 (08-28-24 @ 06:20), Max: 37.8 (08-27-24 @ 20:04)  HR: 89 (08-28-24 @ 06:20)  BP: 123/76 (08-28-24 @ 06:20)  RR: 20 (08-28-24 @ 06:20)  SpO2: 95% (08-28-24 @ 06:20)  Wt(kg): --      GEN: NAD, normocephalic  CVS: S1S2+  CHEST: clear to auscultation, saturating well on  2 L NC   ABD: obese abdomen , soft , non distended , generalized tenderness to palpation more so in RLL, LLQ, NO guarding / rigidity, BS increased   EXTR: no cyanosis, no clubbing, no edema  NEURO: Awake and alert; oriented x 3   SKIN:  warm;  non icteric    ______________________________________________________________________  LABS:                        13.4   6.32  )-----------( 232      ( 28 Aug 2024 05:10 )             39.1     08-28    138  |  107  |  10  ----------------------------<  225<H>  4.6   |  23  |  0.89    Ca    9.4      28 Aug 2024 05:10  Phos  2.6     08-28  Mg     2.4     08-28    TPro  8.4<H>  /  Alb  3.8  /  TBili  1.1  /  DBili  x   /  AST  27  /  ALT  48  /  AlkPhos  90  08-27    LIVER FUNCTIONS - ( 27 Aug 2024 20:38 )  Alb: 3.8 g/dL / Pro: 8.4 g/dL / ALK PHOS: 90 U/L / ALT: 48 U/L DA / AST: 27 U/L / GGT: x           PT/INR - ( 27 Aug 2024 20:38 )   PT: 13.2 sec;   INR: 1.16 ratio         PTT - ( 27 Aug 2024 20:38 )  PTT:33.8 sec  ____________________________________________  IMAGING:                 INITIAL ADVANCE GI CONSULTATION    Patient is a 33y old  Male who presents with a chief complaint of Pneumonia due to infectious organism     (28 Aug 2024 10:19)    HPI:  Patient is a 33y M, PMHx of PE 2/2 covid. Presented to the ED with c/o shortness of breath, cough, fevers, night sweats, pleuritic chest pain starting sunday. He went to the urgent care on sunday when the symptoms started and was prescribed levoquin which he took for 2 days and stopped after noticing worsening of his symptoms. His chest pain is diffuse and pleuritic in nature it occurs on coughing. He used to be able to walk without experiencing SOB however since symptoms have started he is currently able to walk 2-3 blocks. Since 2 days ago he started having fevers w/ chills at home Tmax of 103.2 he tried tylenol and motrin however they did not keep his fevers down. Starting Sun he he started having very loose diarrhea, non bloody, about 4 episodes Sun (this was a day after stopping levoquin), denies abddominal pain however notes decreased appetite.   His daughter and wife were both sick, daughter had an URTI and wife had viral conjunctivitis last week.  (27 Aug 2024 23:33)      GI HPI :     Patient endorses he has had " GI issues " all his life which have worsened in the past couple of years, his last colitis flare was 10 m ago which was treated at [facility]. Patient reports most recent EGD/ Cx was about 1 year ago - endorses that there were no " major issues on the report"   Patient states he started to have watery diarrhea , 3-4 episodes per a day after starting ABx ( levaquin) this sunday for URTI which he stopped after 2 doses. Diarrhea has persisted, 3 episodes this morning. Patient is also nauseous, reports 2 episodes of NBNB vomiting yesterday, now resolved. Associated with fever and chills  Reports diffuse abdominal pain x 2 days - described as generalized discomfort, no relieving factors. Patient reports that he intermittently observes blood while wiping with toilet paper.   Patient reports that his clothes have become loose in the past few months     Of note - family Hx of stomach cancer in the maternal grand father     PMH/PSH:  PAST MEDICAL & SURGICAL HISTORY:  Pulmonary embolism      No significant past surgical history        FH:  FAMILY HISTORY:  No pertinent family history in first degree relatives        Social History:       MEDS:  MEDICATIONS  (STANDING):  albuterol/ipratropium for Nebulization 3 milliLiter(s) Nebulizer every 6 hours  azithromycin  IVPB      cefTRIAXone   IVPB 1000 milliGRAM(s) IV Intermittent every 24 hours  enoxaparin Injectable 40 milliGRAM(s) SubCutaneous every 24 hours  guaiFENesin ER 1200 milliGRAM(s) Oral every 12 hours    MEDICATIONS  (PRN):  acetaminophen     Tablet .. 650 milliGRAM(s) Oral every 6 hours PRN Temp greater or equal to 38C (100.4F), Mild Pain (1 - 3)  aluminum hydroxide/magnesium hydroxide/simethicone Suspension 30 milliLiter(s) Oral every 4 hours PRN Dyspepsia  melatonin 3 milliGRAM(s) Oral at bedtime PRN Insomnia  ondansetron Injectable 4 milliGRAM(s) IV Push every 8 hours PRN Nausea and/or Vomiting    Allergies    No Known Allergies    Intolerances            CONSTITUTIONAL:  +weight loss, +fever, + chills, No weakness or+ fatigue.  HEENT:  Eyes:  No visual loss, blurred vision, double vision or yellow sclerae. Ears, Nose, Throat:  No hearing loss, sneezing, congestion, runny nose or sore throat.  SKIN:  No rash or itching.  CARDIOVASCULAR:  No chest pain, chest pressure or chest discomfort. No palpitations or edema.  RESPIRATORY:  No shortness of breath, cough or sputum.  GASTROINTESTINAL:  SEE HPI  GENITOURINARY:  No dysuria, hematuria, urinary frequency  NEUROLOGICAL:  No headache, dizziness, syncope, paralysis, ataxia, numbness or tingling in the extremities. No change in bowel or bladder control.  MUSCULOSKELETAL:  No muscle, back pain, joint pain or stiffness.  HEMATOLOGIC:  No anemia, bleeding or bruising.  LYMPHATICS:  No enlarged nodes. No history of splenectomy.  PSYCHIATRIC:  No history of depression or anxiety.  ENDOCRINOLOGIC:  No reports of sweating, cold or heat intolerance. No polyuria or polydipsia.      ______________________________________________________________________  PHYSICAL EXAM:  T(C): 36.9 (08-28-24 @ 06:20), Max: 37.8 (08-27-24 @ 20:04)  HR: 89 (08-28-24 @ 06:20)  BP: 123/76 (08-28-24 @ 06:20)  RR: 20 (08-28-24 @ 06:20)  SpO2: 95% (08-28-24 @ 06:20)  Wt(kg): --      GEN: NAD, normocephalic  CVS: S1S2+  CHEST: clear to auscultation, saturating well on  2 L NC   ABD: obese abdomen , soft , non distended , generalized tenderness to palpation more so in RLL, LLQ, NO guarding / rigidity, BS increased   EXTR: no cyanosis, no clubbing, no edema  NEURO: Awake and alert; oriented x 3   SKIN:  warm;  non icteric    ______________________________________________________________________  LABS:                        13.4   6.32  )-----------( 232      ( 28 Aug 2024 05:10 )             39.1     08-28    138  |  107  |  10  ----------------------------<  225<H>  4.6   |  23  |  0.89    Ca    9.4      28 Aug 2024 05:10  Phos  2.6     08-28  Mg     2.4     08-28    TPro  8.4<H>  /  Alb  3.8  /  TBili  1.1  /  DBili  x   /  AST  27  /  ALT  48  /  AlkPhos  90  08-27    LIVER FUNCTIONS - ( 27 Aug 2024 20:38 )  Alb: 3.8 g/dL / Pro: 8.4 g/dL / ALK PHOS: 90 U/L / ALT: 48 U/L DA / AST: 27 U/L / GGT: x           PT/INR - ( 27 Aug 2024 20:38 )   PT: 13.2 sec;   INR: 1.16 ratio         PTT - ( 27 Aug 2024 20:38 )  PTT:33.8 sec  ____________________________________________  IMAGING:

## 2024-08-28 NOTE — CONSULT NOTE ADULT - NS ATTEND AMEND GEN_ALL_CORE FT
33 M h/o PE from COVID, now here with PNA (not COVID) but also diarrhea. Diarrhea preceded outpatient antibiotics, though it might have worsened with them.   The patient is a poor historian. Knows he had colitis before, doubts it was UC, doesn't remember meds for it. Insists it's been flaring intermittently over the years though also insists never on pred, biologic, ASA for it, only antibiotics.   Dr Palacios (O/P GI) scoped him recently "and there were no major issues".NO meds prescribed.   Plan  prior GI records  GI PCR  calpro  ervin reg diet - continue  GI eval not the reason this pt is admitted; diarrhea not a limiting factor to discharge

## 2024-08-28 NOTE — PROGRESS NOTE ADULT - SUBJECTIVE AND OBJECTIVE BOX
NP Note discussed with Primary Attending    INTERVAL HPI/OVERNIGHT EVENTS: Pt in bed, endorsing SOB on exertion and requesting nebulizer treatments. Pt also still having diarrhea. C. diff noted to be negative.     MEDICATIONS (STANDING):  albuterol/ipratropium for Nebulization 3 milliLiter(s) Nebulizer every 6 hours  azithromycin  IVPB      cefTRIAXone   IVPB 1000 milliGRAM(s) IV Intermittent every 24 hours  enoxaparin Injectable 40 milliGRAM(s) SubCutaneous every 24 hours  guaiFENesin ER 1200 milliGRAM(s) Oral every 12 hours    MEDICATIONS  (PRN):  acetaminophen     Tablet .. 650 milliGRAM(s) Oral every 6 hours PRN Temp greater or equal to 38C (100.4F), Mild Pain (1 - 3)  aluminum hydroxide/magnesium hydroxide/simethicone Suspension 30 milliLiter(s) Oral every 4 hours PRN Dyspepsia  melatonin 3 milliGRAM(s) Oral at bedtime PRN Insomnia  ondansetron Injectable 4 milliGRAM(s) IV Push every 8 hours PRN Nausea and/or Vomiting    __________________________________________________  REVIEW OF SYSTEMS:  CONSTITUTIONAL: No fever,   EYES: no acute visual disturbances  NECK: No pain or stiffness  RESPIRATORY: infrequent cough with white phlegm, SOB on exertion  CARDIOVASCULAR: chest pain when coughing. no palpitations  GASTROINTESTINAL: abdominal pain, nausea, endorsing diarrhea, sees blood when he wipes but no blood in stool.   NEUROLOGICAL: No headache or numbness, no tremors  MUSCULOSKELETAL: No joint pain, no muscle pain  GENITOURINARY: no dysuria, no frequency, no hesitancy  PSYCHIATRY: no depression , no anxiety  ALL OTHER  ROS negative      Vital Signs Last 24 Hrs  T(C): 36.9 (28 Aug 2024 06:20), Max: 37.8 (27 Aug 2024 20:04)  T(F): 98.4 (28 Aug 2024 06:20), Max: 100 (27 Aug 2024 20:04)  HR: 89 (28 Aug 2024 06:20) (87 - 110)  BP: 123/76 (28 Aug 2024 06:20) (115/72 - 141/88)  BP(mean): 87 (28 Aug 2024 06:20) (87 - 87)  RR: 20 (28 Aug 2024 06:20) (20 - 24)  SpO2: 95% (28 Aug 2024 06:20) (93% - 95%)    Parameters below as of 28 Aug 2024 06:20  Patient On (Oxygen Delivery Method): room air    ________________________________________________  PHYSICAL EXAM:  GENERAL: NAD  HEENT: Normocephalic; conjunctivae and sclerae clear; moist mucous membranes;   NECK : supple  CHEST/LUNG: expiratory wheezing throughout  HEART: S1 S2  regular; no murmurs, gallops or rubs  ABDOMEN: Soft, Nontender, Nondistended; Bowel sounds present  EXTREMITIES: no cyanosis; no edema; no calf tenderness  SKIN: warm and dry; no rash  NERVOUS SYSTEM:  Awake and alert; Oriented  to place, person and time ; no new deficits    _________________________________________________  LABS:                        13.4   6.32  )-----------( 232      ( 28 Aug 2024 05:10 )             39.1     08-28    138  |  107  |  10  ----------------------------<  225<H>  4.6   |  23  |  0.89    Ca    9.4      28 Aug 2024 05:10  Phos  2.6     08-28  Mg     2.4     08-28    TPro  8.4<H>  /  Alb  3.8  /  TBili  1.1  /  DBili  x   /  AST  27  /  ALT  48  /  AlkPhos  90  08-27    PT/INR - ( 27 Aug 2024 20:38 )   PT: 13.2 sec;   INR: 1.16 ratio    PTT - ( 27 Aug 2024 20:38 )  PTT:33.8 sec  Urinalysis with Rflx Culture (08.28.24 @ 03:20)   Urine Appearance: Clear  Color: Yellow  Specific Gravity: 1.018  pH Urine: 6.5  Protein, Urine: Negative mg/dL  Glucose Qualitative, Urine: Negative mg/dL  Ketone - Urine: 80 mg/dL  Blood, Urine: Negative  Bilirubin: Negative  Urobilinogen: 1.0 mg/dL  Leukocyte Esterase Concentration: Negative  Nitrite: Negative  SARS-CoV-2 Result: NotDetec   Influenza A Result: NotDetec   Influenza B Result: NotDetec  C-Reactive Protein: 135 mg/L  Sedimentation Rate, Erythrocyte: 66 mm/Hr  Clostridium difficile GDH Toxins A&B, EIA: Negative  CAPILLARY BLOOD GLUCOSE      RADIOLOGY & ADDITIONAL TESTS:  < from: Xray Chest 1 View-PORTABLE IMMEDIATE (08.27.24 @ 21:18) >  ACC: [id number] EXAM:  XR CHEST PORTABLE IMMED 1V   ORDERED BY: LIN BECK   PROCEDURE DATE:  08/27/2024    INTERPRETATION:  AP semierect chest on August 27, 2024 at 9:13 PM.   Patient has sepsis.  Heart size normal.  Lungs are clear.  Chest is similar to December 17, 2020.    IMPRESSION: No acute finding or change.  --- End of Report ---  MICHELLE POLLARD MD; Attending Radiologist  This document has been electronically signed. Aug 28 2024 11:12AM  < end of copied text >      < from: CT Angio Chest PE Protocol w/ IV Cont (08.27.24 @ 22:27) >  ACC: [id number] EXAM:  CT ANGIO CHEST PULM [facility]   ORDERED BY: LIN BECK   PROCEDURE DATE:  08/27/2024    INTERPRETATION:  CLINICAL INFORMATION: Chest pain, shortness of breath   and hypoxia.  COMPARISON: CT of the chest from 12/18/2020.  CONTRAST/COMPLICATIONS:  IV Contrast: Omnipaque 350  52 cc administered   48 cc discarded  Oral Contrast: NONE  Complications: None reported at time of study completion  PROCEDURE:  CT Angiography of the Chest.  Sagittal and coronal reformats were performed as well as 3D (MIP)   reconstructions.  FINDINGS:  LUNGS AND LARGE AIRWAYS: Patent central airways. Scattered branching   "tree in bud" and groundglass nodular opacities throughout the left   upper, lingula and lowerlobes. Scattered ground glass nodular opacities   in the right lower lobe to a lesser extent.  PLEURA: No pleural effusion.  VESSELS: Limited evaluation of the distal subsegmental pulmonary arteries   secondary to motion artifact. No pulmonary embolism in the main, lobar,   segmental or proximal subsegmental pulmonary arteries.  HEART: Heart size is normal. No pericardial effusion.  MEDIASTINUM AND OPAL: No lymphadenopathy. Prominent AP window and left   hilar lymph nodes which do not meet the size criteria for enlargement and   likely reactive.  CHEST WALL AND LOWER NECK: Within normal limits.  VISUALIZED UPPER ABDOMEN: Hepatic steatosis.  BONES: Within normal limits.    IMPRESSION:  1. Limited evaluation of the distal subsegmental pulmonaryarteries   secondary to motion artifact. No pulmonary embolism in the main, lobar,   segmental or proximal subsegmental pulmonary arteries.  2. Scattered branching "tree in bud" and groundglass nodular opacities   throughout the left upper, lingula and lower lobes and, to a lesser   extent, in the right lower lobe likely infectious in etiology.  --- End of Report ---  JOSH SAMANIEGO MD; Attending Radiologist  This document has been electronically signed. Aug 27 2024 11:12PM  < end of copied text >    Imaging  Reviewed:  YES    Consultant(s) Notes Reviewed:   YES    Plan of care was discussed with patient and /or primary care giver; all questions and concerns were addressed

## 2024-08-28 NOTE — DIETITIAN INITIAL EVALUATION ADULT - ORAL INTAKE PTA/DIET HISTORY
Spoke to pt at bedside, pt reported no new food allergies or intolerances. Pt does not take any vitamins or supplements at home. Pt's intake was poor the last 24 hours PTA. Pt had 4 diarrhea episodes on 8/26, decreased appetite that day. Reported UBW:255 lbs, no recent significant weight changes. No relevant HIE.   Nutrition interview: No recent episodes of constipation per pt. Last BM noted on 8/28 per pt, it was a loose stool. Pt also reports to feeling nauseous and the last vomiting episode was 3 times on tuesday 8/27. Denies any chewing/swallowing difficulties. Intake is 50-75% per pt. Pt ate bagel and cream cheese during breakfast, he is able to eat however he is just taking it "slow and steady." Food preferences explored and forwarded to dietary.

## 2024-08-28 NOTE — PROGRESS NOTE ADULT - SUBJECTIVE AND OBJECTIVE BOX
S:  he is still having diarrhea with blood tinged.  O:  Vital Signs Last 24 Hrs  T(C): 36.9 (28 Aug 2024 06:20), Max: 37.8 (27 Aug 2024 20:04)  T(F): 98.4 (28 Aug 2024 06:20), Max: 100 (27 Aug 2024 20:04)  HR: 89 (28 Aug 2024 06:20) (87 - 110)  BP: 123/76 (28 Aug 2024 06:20) (115/72 - 141/88)  BP(mean): 87 (28 Aug 2024 06:20) (87 - 87)  RR: 20 (28 Aug 2024 06:20) (20 - 24)  SpO2: 95% (28 Aug 2024 06:20) (93% - 95%)    Parameters below as of 28 Aug 2024 06:20  Patient On (Oxygen Delivery Method): room air      PE:  Gen: Oriented, alert, No acute distress Eyes: conjunctivae and lid normal, sclera clear with no icterus ENT: nose and throat exam normal CVS: S1, S2, RRR;  no murmur, no rubs, no gallops Pulm: Good air exchange, Breath sounds equal bilaterally, no rales rhonchi,  no wheezes Chest: nontender, no chest deformity, chest movement symmetrical Gl: abdomen soft, nontender, nondistended, bowel sounds normoactive, no masses palpated Musk: no msk pain, no joint swelling Skin: no skin lesions, skin turgor normal, warm and well perfused Neuro: Awake, alert,Psych: normal affect, insight    Labs reviewed:                         13.4   6.32  )-----------( 232      ( 28 Aug 2024 05:10 )             39.1   08-28    138  |  107  |  10  ----------------------------<  225<H>  4.6   |  23  |  0.89    Ca    9.4      28 Aug 2024 05:10  Phos  2.6     08-28  Mg     2.4     08-28    TPro  8.4<H>  /  Alb  3.8  /  TBili  1.1  /  DBili  x   /  AST  27  /  ALT  48  /  AlkPhos  90  08-27  C diff negative  RVP, COVID, influenza not detected  Imaging reviewed:   CTA chest:  1. Limited evaluation of the distal subsegmental pulmonary arteries   secondary to motion artifact. No pulmonary embolism in the main, lobar,   segmental or proximal subsegmental pulmonary arteries.  2. Scattered branching "tree in bud" and groundglass nodular opacities   throughout the left upper, lingula and lower lobes and, to a lesser   extent, in the right lower lobe likely infectious in etiology.

## 2024-08-28 NOTE — CONSULT NOTE ADULT - ASSESSMENT
RECOMMENDATIONS :   CTAP PO and IV contrast   Please obtain OP Cx, EGD reports from 2023  ( patient follows up with GI Dr. Tomasz Anguiano )   C diff negative   f/u stool studies - Stool Cx, O&P , GI PCR   ESR, CRP  fecal calprotectin     THIS NOTE IS INCOMPLETE TILL SIGNED BY THE ATTENDING  Patient is a 33yM w/ PMHx of PE 2/2 covid 2020, presented with sob, fever w/ chills, dry cough and pleuritis chest pain since 3 days ago with new onset of watery diarrhea. In the ED vs: afeb, tachycardic to 90s, RR 24 sat 93-95% on RA. wbc wnl, CTa chest negative for PE, shows tree in bud appearance and ground glass opacities in bilateral lung fields more in Left > right. Patient is admitted for sepsis 2/2 PNA and work up diarrhea, c diff negative. GI consulted for management of diarrhea, possible etiolgies are infectious vs inflammatory vs iatrogenic ( ABx induced).       RECOMMENDATIONS :   CTAP PO and IV contrast   Please obtain OP Cx, EGD reports from 2023  ( patient follows up with GI Dr. Tomasz Anguiano )   C diff negative   f/u stool studies - Stool Cx, O&P , GI PCR   ESR, CRP  fecal calprotectin     THIS NOTE IS INCOMPLETE TILL SIGNED BY THE ATTENDING  Patient is a 33yM w/ PMHx of PE 2/2 covid 2020, ? IBS vs IBD presented with sob, fever w/ chills, dry cough and pleuritis chest pain since 3 days ago with new onset of watery diarrhea. In the ED vs: afeb, tachycardic to 90s, RR 24 sat 93-95% on RA. wbc wnl, CTa chest negative for PE, shows tree in bud appearance and ground glass opacities in bilateral lung fields more in Left > right. Patient is admitted for sepsis 2/2 PNA and work up diarrhea, c diff negative. GI consulted for management of diarrhea, possible etiologies are infectious vs inflammatory vs iatrogenic ( ABx induced).     # Diarrhea - awtery x 2-3 days   # Hx of colitis in the past ? infectious vs inflammatory vs iatrogenic   # sepsis 2/2 PNA     RECOMMENDATIONS :   No abdominal imaging warranted at this time   obtain OP Cx, EGD reports from 2023  ( patient follows up with GI Dr. Tomasz Anguiano ) patient is unsure of his Dx ( ? IBS vs IBD)   ESR, CRP noted to be elevated    C diff negative   f/u stool studies - Stool Cx, O&P , GI PCR   fecal calprotectin     rest of Rx as per primary team       Thanks for this consult. GI will continue to follow.   THIS NOTE IS INCOMPLETE TILL SIGNED BY THE ATTENDING

## 2024-08-28 NOTE — DIETITIAN INITIAL EVALUATION ADULT - PERTINENT MEDS FT
MEDICATIONS  (STANDING):  azithromycin  IVPB      cefTRIAXone   IVPB 1000 milliGRAM(s) IV Intermittent every 24 hours  enoxaparin Injectable 40 milliGRAM(s) SubCutaneous every 24 hours  guaiFENesin ER 1200 milliGRAM(s) Oral every 12 hours    MEDICATIONS  (PRN):  acetaminophen     Tablet .. 650 milliGRAM(s) Oral every 6 hours PRN Temp greater or equal to 38C (100.4F), Mild Pain (1 - 3)  aluminum hydroxide/magnesium hydroxide/simethicone Suspension 30 milliLiter(s) Oral every 4 hours PRN Dyspepsia  melatonin 3 milliGRAM(s) Oral at bedtime PRN Insomnia  ondansetron Injectable 4 milliGRAM(s) IV Push every 8 hours PRN Nausea and/or Vomiting

## 2024-08-28 NOTE — DIETITIAN INITIAL EVALUATION ADULT - ORAL NUTRITION SUPPLEMENTS
Consider protein supplements if pt is unable to consume >75% of meals, to promote adequate PO intake.

## 2024-08-28 NOTE — DIETITIAN INITIAL EVALUATION ADULT - FACTORS AFF FOOD INTAKE
pain/persistent diarrhea/persistent nausea/Rastafari/ethnic/cultural/personal food preferences/vomiting

## 2024-08-28 NOTE — PATIENT PROFILE ADULT - FALL HARM RISK - HARM RISK INTERVENTIONS
Assistance with ambulation/Assistance OOB with selected safe patient handling equipment/Communicate Risk of Fall with Harm to all staff/Discuss with provider need for PT consult/Monitor for mental status changes/Monitor gait and stability/Move patient closer to nurses' station/Orthostatic vital signs/Provide patient with walking aids - walker, cane, crutches/Reinforce activity limits and safety measures with patient and family/Reorient to person, place and time as needed/Review medications for side effects contributing to fall risk/Sit up slowly, dangle for a short time, stand at bedside before walking/Tailored Fall Risk Interventions/Toileting schedule using arm’s reach rule for commode and bathroom/Visual Cue: Yellow wristband and red socks/Bed in lowest position, wheels locked, appropriate side rails in place/Call bell, personal items and telephone in reach/Instruct patient to call for assistance before getting out of bed or chair/Non-slip footwear when patient is out of bed/[place] to call system/Physically safe environment - no spills, clutter or unnecessary equipment/Purposeful Proactive Rounding/Room/bathroom lighting operational, light cord in reach

## 2024-08-28 NOTE — CONSULT NOTE ADULT - SUBJECTIVE AND OBJECTIVE BOX
HPI:  Patient is a 33y M, PMHx of PE 2/2 covid. Presented to the ED with c/o shortness of breath, cough, fevers, night sweats, pleuritic chest pain starting sunday. He went to the urgent care on sunday when the symptoms started and was prescribed levoquin which he took for 2 days and stopped after noticing worsening of his symptoms. His chest pain is diffuse and pleuritic in nature it occurs on coughing. He used to be able to walk without experiencing SOB however since symptoms have started he is currently able to walk 2-3 blocks. Since 2 days ago he started having fevers w/ chills at home Tmax of 103.2 he tried tylenol and motrin however they did not keep his fevers down. Starting yesterday he he started having very loose diarrhea, non bloody, about 4 episodes yesterday (this was a day after stopping levoquin), denies abddominal pain however notes decreased appetite.   His daughter and wife were both sick, daughter had an URTI and wife had viral conjunctivitis last week.  (27 Aug 2024 23:33)    ID was consulted for the pt being septic on admission (8/27). At the time of ID consult, pt complained of chest pain on deep inspiration, SOB (on 3L NC), productive cough with a whitish-yellow sputum, generalized abdominal pain and diarrhea since yesterday (non bloody). Denies fevers, chills, body aches currently. Pt has taken Levoquin x2 before admission.    PAST MEDICAL & SURGICAL HISTORY:  Pulmonary embolism      No significant past surgical history          MEDS:  acetaminophen     Tablet .. 650 milliGRAM(s) Oral every 6 hours PRN  albuterol/ipratropium for Nebulization 3 milliLiter(s) Nebulizer every 6 hours  aluminum hydroxide/magnesium hydroxide/simethicone Suspension 30 milliLiter(s) Oral every 4 hours PRN  azithromycin  IVPB (Day 1)  cefTRIAXone   IVPB 1000 milliGRAM(s) IV Intermittent every 24 hours (Day 1)  enoxaparin Injectable 40 milliGRAM(s) SubCutaneous every 24 hours  guaiFENesin ER 1200 milliGRAM(s) Oral every 12 hours  melatonin 3 milliGRAM(s) Oral at bedtime PRN  ondansetron Injectable 4 milliGRAM(s) IV Push every 8 hours PRN  sodium chloride 0.9%. 1000 milliLiter(s) IV Continuous <Continuous>      ALLERGIES  No Known Allergies      SOCIAL HISTORY: Works at [employer]. Lives with his wife (works at [employer]) and 3y daughter who goes to . Denies smoking, drinking and vaping. No recent travel history. Wife had viral conjunctivitis 4-5 days ago and daughter had a URTI last week.    FAMILY HISTORY:  No pertinent family history in first degree relatives        ROS:     General: No fever, anxious disposition, but NAD    Skin: warm and dry  	  HEENT: NAD    Respiratory and Thorax: intermittent cough with whitish-yellow sputum, shortness of breath after walking 1-2 blocks, chest pain on deep inspiration  	  Cardiovascular:	No palpitations, excessive sweating     Abdomen: Nausea, generalized abdominal pain, loose stools (non bloody) but notices blood on wiping with minimal pain.    Genitourinary: No urinary issues    Musculoskeletal:	 Generalized weakness    Neurological: AAOx3    Psychiatric: Feels distressed and anxious    PHYSICAL EXAM:    Vital Signs Last 24 Hrs  T(C): 36.9 (28 Aug 2024 13:42), Max: 37.8 (27 Aug 2024 20:04)  T(F): 98.4 (28 Aug 2024 13:42), Max: 100 (27 Aug 2024 20:04)  HR: 93 (28 Aug 2024 13:42) (87 - 110)  BP: 119/71 (28 Aug 2024 13:42) (115/72 - 141/88)  BP(mean): 87 (28 Aug 2024 06:20) (87 - 87)  RR: 20 (28 Aug 2024 13:42) (20 - 24)  SpO2: 94% (28 Aug 2024 13:42) (93% - 95%)    Parameters below as of 28 Aug 2024 13:42  Patient On (Oxygen Delivery Method): nasal cannula  O2 Flow (L/min): 2        Gen: NAD, Anxious disposition    Back: no CVAT    Chest/Thorax: +expiratory wheezes R&L LL, bilatera; airway entry present    Cardiovascular: S1 S2+, no murmurs, gallops or rubs appreciated    Gastrointestinal: Generalized abdominal pain ++deep palpation, abdomen soft, bowel sounds active    Extremities: No pedal edema, clubbing cyanosis. Peripheral pulses present.    Neurologic: AAOx3    LABS/DIAGNOSTIC TESTS:                          13.4   6.32  )-----------( 232      ( 28 Aug 2024 05:10 )             39.1     WBC Count: 6.32 K/uL (08-28 @ 05:10)  WBC Count: 8.15 K/uL (08-27 @ 20:38)      08-28    138  |  107  |  10  ----------------------------<  225<H>  4.6   |  23  |  0.89    Ca    9.4      28 Aug 2024 05:10  Phos  2.6     08-28  Mg     2.4     08-28    TPro  8.4<H>  /  Alb  3.8  /  TBili  1.1  /  DBili  x   /  AST  27  /  ALT  48  /  AlkPhos  90  08-27      LIVER FUNCTIONS - ( 27 Aug 2024 20:38 )  Alb: 3.8 g/dL / Pro: 8.4 g/dL / ALK PHOS: 90 U/L / ALT: 48 U/L DA / AST: 27 U/L / GGT: x             PT/INR - ( 27 Aug 2024 20:38 )   PT: 13.2 sec;   INR: 1.16 ratio         PTT - ( 27 Aug 2024 20:38 )  PTT:33.8 sec    LACTATE:Lactate, Blood: 1.2 mmol/L (08-27 @ 20:38)      Urinalysis with Rflx Culture (collected 08-28-24 @ 03:20)        RADIOLOGY    CTA Chest: (8/27/2024)  1. Limited evaluation of the distal subsegmental pulmonaryarteries   secondary to motion artifact. No pulmonary embolism in the main, lobar,   segmental or proximal subsegmental pulmonary arteries.  2. Scattered branching "tree in bud" and groundglass nodular opacities   throughout the left upper, lingula and lower lobes and, to a lesser   extent, in the right lower lobe likely infectious in etiology.                   HPI:  Patient is a 33y M, PMHx of PE 2/2 covid. Presented to the ED with c/o shortness of breath, cough, fevers, night sweats, pleuritic chest pain starting sunday. He went to the urgent care on sunday when the symptoms started and was prescribed levoquin which he took for 2 days and stopped after noticing worsening of his symptoms. His chest pain is diffuse and pleuritic in nature it occurs on coughing. He used to be able to walk without experiencing SOB however since symptoms have started he is currently able to walk 2-3 blocks. Since 2 days ago he started having fevers w/ chills at home Tmax of 103.2 he tried tylenol and motrin however they did not keep his fevers down. Starting yesterday he he started having very loose diarrhea, non bloody, about 4 episodes yesterday (this was a day after stopping levoquin), denies abddominal pain however notes decreased appetite.   His daughter and wife were both sick, daughter had an URTI and wife had viral conjunctivitis last week.  (27 Aug 2024 23:33)    ID was consulted for the pt being septic on admission (8/27). At the time of ID consult, pt complained of chest pain on deep inspiration, SOB (on 3L NC), productive cough with a whitish-yellow sputum, generalized abdominal pain and diarrhea since yesterday (non bloody). Denies fevers, chills, body aches currently. Pt has taken Levoquin x2 before admission.    PAST MEDICAL & SURGICAL HISTORY:  Pulmonary embolism      No significant past surgical history          MEDS:  acetaminophen     Tablet .. 650 milliGRAM(s) Oral every 6 hours PRN  albuterol/ipratropium for Nebulization 3 milliLiter(s) Nebulizer every 6 hours  aluminum hydroxide/magnesium hydroxide/simethicone Suspension 30 milliLiter(s) Oral every 4 hours PRN  azithromycin  IVPB (Day 1)  cefTRIAXone   IVPB 1000 milliGRAM(s) IV Intermittent every 24 hours x1  enoxaparin Injectable 40 milliGRAM(s) SubCutaneous every 24 hours  guaiFENesin ER 1200 milliGRAM(s) Oral every 12 hours  melatonin 3 milliGRAM(s) Oral at bedtime PRN  ondansetron Injectable 4 milliGRAM(s) IV Push every 8 hours PRN  sodium chloride 0.9%. 1000 milliLiter(s) IV Continuous <Continuous>      ALLERGIES  No Known Allergies      SOCIAL HISTORY: Works at [email or web address]. Lives with his wife (works at [employer]) and 3y daughter who goes to . Denies smoking, drinking and vaping. No recent travel history. Wife had viral conjunctivitis 4-5 days ago and daughter had a URTI last week.    FAMILY HISTORY:  No pertinent family history in first degree relatives        ROS:     General: No fever, anxious disposition, but NAD    Skin: warm and dry  	  HEENT: NAD    Respiratory and Thorax: intermittent cough with whitish-yellow sputum, shortness of breath after walking 1-2 blocks, chest pain on deep inspiration  	  Cardiovascular:	No palpitations, excessive sweating     Abdomen: Nausea, generalized abdominal pain, loose stools (non bloody) but notices blood on wiping with minimal pain.    Genitourinary: No urinary issues    Musculoskeletal:	 Generalized weakness    Neurological: AAOx3    Psychiatric: Feels distressed and anxious    PHYSICAL EXAM:    Vital Signs Last 24 Hrs  T(C): 36.9 (28 Aug 2024 13:42), Max: 37.8 (27 Aug 2024 20:04)  T(F): 98.4 (28 Aug 2024 13:42), Max: 100 (27 Aug 2024 20:04)  HR: 93 (28 Aug 2024 13:42) (87 - 110)  BP: 119/71 (28 Aug 2024 13:42) (115/72 - 141/88)  BP(mean): 87 (28 Aug 2024 06:20) (87 - 87)  RR: 20 (28 Aug 2024 13:42) (20 - 24)  SpO2: 94% (28 Aug 2024 13:42) (93% - 95%)    Parameters below as of 28 Aug 2024 13:42  Patient On (Oxygen Delivery Method): nasal cannula  O2 Flow (L/min): 2        Gen: NAD, Anxious disposition    Back: no CVAT    Chest/Thorax: +expiratory wheezes R&L LL, bilatera; airway entry present    Cardiovascular: S1 S2+, no murmurs, gallops or rubs appreciated    Gastrointestinal: Generalized abdominal pain ++deep palpation, abdomen soft, bowel sounds active    Extremities: No pedal edema, clubbing cyanosis. Peripheral pulses present.    Neurologic: AAOx3    LABS/DIAGNOSTIC TESTS:                          13.4   6.32  )-----------( 232      ( 28 Aug 2024 05:10 )             39.1     WBC Count: 6.32 K/uL (08-28 @ 05:10)  WBC Count: 8.15 K/uL (08-27 @ 20:38)      08-28    138  |  107  |  10  ----------------------------<  225<H>  4.6   |  23  |  0.89    Ca    9.4      28 Aug 2024 05:10  Phos  2.6     08-28  Mg     2.4     08-28    TPro  8.4<H>  /  Alb  3.8  /  TBili  1.1  /  DBili  x   /  AST  27  /  ALT  48  /  AlkPhos  90  08-27      LIVER FUNCTIONS - ( 27 Aug 2024 20:38 )  Alb: 3.8 g/dL / Pro: 8.4 g/dL / ALK PHOS: 90 U/L / ALT: 48 U/L DA / AST: 27 U/L / GGT: x             PT/INR - ( 27 Aug 2024 20:38 )   PT: 13.2 sec;   INR: 1.16 ratio         PTT - ( 27 Aug 2024 20:38 )  PTT:33.8 sec    LACTATE:Lactate, Blood: 1.2 mmol/L (08-27 @ 20:38)      Urinalysis with Rflx Culture (collected 08-28-24 @ 03:20)        RADIOLOGY    CTA Chest: (8/27/2024)  1. Limited evaluation of the distal subsegmental pulmonaryarteries   secondary to motion artifact. No pulmonary embolism in the main, lobar,   segmental or proximal subsegmental pulmonary arteries.  2. Scattered branching "tree in bud" and groundglass nodular opacities   throughout the left upper, lingula and lower lobes and, to a lesser   extent, in the right lower lobe likely infectious in etiology.                   HPI:  Patient is a 33y M, PMHx of PE 2/2 covid,?? irritable bowel syndrome who presented to the ED with c/o shortness of breath, cough, fevers, night sweats, pleuritic chest pain starting 8/26. He went to urgent care on 8/25 when the symptoms started. He was prescribed levoquin which he took for 2 days and stopped after noticing worsening of his symptoms. His chest pain is diffuse, pleuriitic but primarily associated with coughing. However since symptoms have started he is currently able to walk 2-3 blocks before developing CAMPBELL. Since 2 days ago he started having fevers w/ chills at home Tmax of 103.2 he tried tylenol and motrin which did not keep his fevers down. Starting yesterday, he began having very loose stool, non bloody, about 4 episodes yesterday (this was a day after stopping levoquin), denies abdominal pain however notes decreased appetite. His daughter and wife were both sickf--daughter had a URTI and wife had viral conjunctivitis last week.  (27 Aug 2024 23:33).    ID was consulted for the pt being septic on admission--met criteria (8/27). At the time of ID consult, pt complained of chest pain on deep inspiration, SOB (on 3L NC), productive cough with a whitish-yellow sputum, generalized abdominal pain and diarrhea since yesterday (non bloody). Denies fevers, chills, body aches currently. Pt has taken Levoquin x2 before admission.    PAST MEDICAL & SURGICAL HISTORY:  Pulmonary embolism      No significant past surgical history          MEDS:  acetaminophen     Tablet .. 650 milliGRAM(s) Oral every 6 hours PRN  albuterol/ipratropium for Nebulization 3 milliLiter(s) Nebulizer every 6 hours  aluminum hydroxide/magnesium hydroxide/simethicone Suspension 30 milliLiter(s) Oral every 4 hours PRN  azithromycin  IVPB (Day 1)  cefTRIAXone   IVPB 1000 milliGRAM(s) IV Intermittent every 24 hours x1  enoxaparin Injectable 40 milliGRAM(s) SubCutaneous every 24 hours  guaiFENesin ER 1200 milliGRAM(s) Oral every 12 hours  melatonin 3 milliGRAM(s) Oral at bedtime PRN  ondansetron Injectable 4 milliGRAM(s) IV Push every 8 hours PRN  sodium chloride 0.9%. 1000 milliLiter(s) IV Continuous <Continuous>      ALLERGIES  No Known Allergies      SOCIAL HISTORY: Works at [employer]. Lives with his wife (works at [employer]) and 3y daughter who goes to . Denies smoking, drinking and vaping. No recent travel history. Wife had viral conjunctivitis 4-5 days ago and daughter had a URTI last week.    FAMILY HISTORY:  No pertinent family history in first degree relatives        ROS:     General: No fever, anxious disposition, but NAD    Skin: no rashes  	  HEENT: H/A    Respiratory and Thorax: intermittent cough with whitish-yellow sputum, shortness of breath after walking 1-2 blocks, chest pain on deep inspiration and coughing  	  Cardiovascular:	No palpitations, excessive sweating     Abdomen: Nausea, generalized abdominal pain, loose stools (non bloody) but notices blood on wiping with minimal pain.    Genitourinary: No urinary issues    Musculoskeletal:	 Generalized weakness    Neurological: AAOx3    Psychiatric: Feels distressed and anxious      PHYSICAL EXAM:    Vital Signs Last 24 Hrs  T(C): 36.9 (28 Aug 2024 13:42), Max: 37.8 (27 Aug 2024 20:04)  T(F): 98.4 (28 Aug 2024 13:42), Max: 100 (27 Aug 2024 20:04)  HR: 93 (28 Aug 2024 13:42) (87 - 110)  BP: 119/71 (28 Aug 2024 13:42) (115/72 - 141/88)  BP(mean): 87 (28 Aug 2024 06:20) (87 - 87)  RR: 20 (28 Aug 2024 13:42) (20 - 24)  SpO2: 94% (28 Aug 2024 13:42) (93% - 95%)    Parameters below as of 28 Aug 2024 13:42  Patient On (Oxygen Delivery Method): nasal cannula  O2 Flow (L/min): 2        Gen: NAD, Anxious disposition    HEENT: NC/AT; conj. clear; mouth--good oral dentition    Back: no CVAT    Chest/Thorax: +expiratory wheezes R&L LL, bilatera; airway entry present (I did not hear wheezing but did hear lower lung rales--MAS)    Cardiovascular: S1 S2+, no murmurs, gallops or rubs appreciated    Gastrointestinal: Generalized abdominal tenderness to deep palpation, abdomen soft, bowel sounds active    Extremities: No pedal edema, clubbing cyanosis. Peripheral pulses present.    Neurologic: AAOx3    SKIN: no rashes      LABS/DIAGNOSTIC TESTS:                        13.4   6.32  )-----------( 232      ( 28 Aug 2024 05:10 )             39.1     WBC Count: 6.32 K/uL (08-28 @ 05:10)  WBC Count: 8.15 K/uL (08-27 @ 20:38)      08-28    138  |  107  |  10  ----------------------------<  225<H>  4.6   |  23  |  0.89    Ca    9.4      28 Aug 2024 05:10  Phos  2.6     08-28  Mg     2.4     08-28    TPro  8.4<H>  /  Alb  3.8  /  TBili  1.1  /  DBili  x   /  AST  27  /  ALT  48  /  AlkPhos  90  08-27      LIVER FUNCTIONS - ( 27 Aug 2024 20:38 )  Alb: 3.8 g/dL / Pro: 8.4 g/dL / ALK PHOS: 90 U/L / ALT: 48 U/L DA / AST: 27 U/L / GGT: x             PT/INR - ( 27 Aug 2024 20:38 )   PT: 13.2 sec;   INR: 1.16 ratio         PTT - ( 27 Aug 2024 20:38 )  PTT:33.8 sec    LACTATE:Lactate, Blood: 1.2 mmol/L (08-27 @ 20:38)    uUrinalysis with Rflx Culture (08.28.24 @ 03:20)   Urine Appearance: Clear  Color: Yellow  Specific Gravity: 1.018  pH Urine: 6.5  Protein, Urine: Negative mg/dL  Glucose Qualitative, Urine: Negative mg/dL  Ketone - Urine: 80 mg/dL  Blood, Urine: Negative  Bilirubin: Negative  Urobilinogen: 1.0 mg/dL  Leukocyte Esterase Concentration: Negative  Nitrite: Negative    Respiratory Viral Panel with COVID-19 by JERRY (08.28.24 @ 04:23)   Rapid RVP Result: NotDete  SARS-CoV-2: NotDetec    C. difficile GDH & toxins A/B by EIA (08.28.24 @ 07:00)   Clostridium difficile GDH Toxins A&B, EIA:   Negative  Clostridium difficile GDH Interpretation:   Negative for toxigenic C. Difficile.         RADIOLOGY    CTA Chest: (8/27/2024)  1. Limited evaluation of the distal subsegmental pulmonaryarteries   secondary to motion artifact. No pulmonary embolism in the main, lobar,   segmental or proximal subsegmental pulmonary arteries.  2. Scattered branching "tree in bud" and groundglass nodular opacities   throughout the left upper, lingula and lower lobes and, to a lesser   extent, in the right lower lobe likely infectious in etiology.

## 2024-08-28 NOTE — DIETITIAN INITIAL EVALUATION ADULT - ADD RECOMMEND
1) Continue current diet as medically feasible.  2) Encourage PO intake and honor food preferences as able.  3) Monitor PO intake, labs, weights, BM's, and skin integrity.

## 2024-08-28 NOTE — CONSULT NOTE ADULT - ASSESSMENT
Patient is a 33y M, PMHx of PE 2/2 covid. Presented to the ED with c/o shortness of breath, cough, fevers, night sweats, pleuritic chest pain starting sunday (8/25). ID was consulted for the pt being septic on admission (8/27). At the time of ID consult, pt complained of chest pain on deep inspiration, SOB (on 3L NC), productive cough with a whitish-yellow sputum, generalized abdominal pain and diarrhea since yesterday (non bloody). Denies fevers, chills, body aches currently.      #Sepsis likely 2/2 PNA vs viral gastroenteritis  p/w sob on exertion  RVP: negative  CXR: negative  - Hold CTX  - c/w Azithromycin PO  - f/u  Strep ag, Legionella, procalcitonin, Mycoplasma  - tylenol prn    #Diarrhea likely 2/2 viral gastroenteritis vs antibiotic use  1 day Hx of loose non bloody stools, non painful.  - f/u GI PCR panel   Patient is a 33y M, PMHx of PE 2/2 covid. Presented to the ED with c/o shortness of breath, cough, fevers, night sweats, pleuritic chest pain starting sunday (8/25). ID was consulted for the pt being septic on admission (8/27). At the time of ID consult, pt complained of chest pain on deep inspiration, SOB (on 3L NC), productive cough with a whitish-yellow sputum, generalized abdominal pain and diarrhea since yesterday (non bloody). Denies fevers, chills, body aches currently.      #Sepsis likely 2/2 PNA vs viral gastroenteritis  p/w sob on exertion  RVP: negative  CXR: negative  - Hold CTX  - c/w Azithromycin PO  - f/u  Strep ag, Legionella, procalcitonin, Mycoplasma  - tylenol prn    #Diarrhea likely 2/2 viral gastroenteritis vs antibiotic use  1 day Hx of loose non bloody stools, non painful.  C. diff negative  - f/u GI PCR panel   Patient is a 33y M, PMHx of PE 2/2 covid. Presented to the ED with c/o shortness of breath, cough, fevers, night sweats, pleuritic chest pain starting sunday (8/25). ID was consulted for the pt being septic on admission (8/27). At the time of ID consult, pt complained of chest pain on deep inspiration, SOB (on 3L NC), productive cough with a whitish-yellow sputum, generalized abdominal pain and diarrhea since yesterday (non bloody). Denies fevers, chills, body aches currently but still with CP associated with coughing.      #Sepsis likely 2/2 PNA vs viral pneumonia--p/w sob on exertion; RVP: negative; CTA: abnormal and may represent old findings (as described above). BCs negative to date.   - cont IV CTX for now  - c/w Azithromycin PO  - f/u  Strep and Legionella Ag, procalcitonin, Mycoplasma  - tylenol prn  --f/u final BC results    #Diarrhea likely 2/2 viral gastroenteritis vs antibiotic use although Cdif neg  - f/u GI PCR panel    #Hyperglycemia-- pt has no known h/o DM but would be concerned about elevated glu   --obtain HbA1c  --follow glu    Chart reviewed, including notes/labs/imaging; pt examined at the bedside; discussed Dx/management of sepsis with the pt and the medical team; provided coordination of care re ID issues with Dr. De La Torre.      Addendum: Chart note reporting ESBL Klebsiella in the BC for this patient is incorrect, which I verified through the [facility]. The ESBL Klebsiella bacteremia is for another patient on 6S. I informed Dr. De La Torre and the NM (Tim Gomez) of the error, especially to make sure that the patient who is bacteremic is appropriately treated. I also contacted Dr. Gutierrez, who is the attending for the patient with the bacteremia, to let him know.

## 2024-08-28 NOTE — PROGRESS NOTE ADULT - PROBLEM SELECTOR PLAN 4
- from home  - pending ID, GI, and pulm reccs  - pending bCx, sputum culture, and pna workup - from home  - pending ID, GI, and pulm reccs  - pending bCx, sputum culture, and pna workup    - obtain records from pt's OP GI Dr. Tomasz Anguiano, affiliated with [facility]

## 2024-08-28 NOTE — PROGRESS NOTE ADULT - ASSESSMENT
34 yo M, from home, with pmhx of covid c/b PE (2020), and IBS who presented with SOB, fever, cough, and pleuritic chest pain. Pt went to urgent care Sunday and received Levaquin PO but was having worsening symptoms so came to ED Tuesday. Pt also endorses diarrhea that developed. In ED pt tachycardic 110s and temp 100F. CXR negative. CTA chest with no PE but showing scattered branching "tree in bud" and groundglass nodular opacities L>R. Pt admitted for sepsis 2/2 PNA. C diff negative, GI consulted. Pulm also consulted as pt with no history of smoking/vape use. ID consulted as well.

## 2024-08-28 NOTE — PROGRESS NOTE ADULT - PROBLEM SELECTOR PLAN 2
Pt has history of IBS. Diarrhea started 3 days ago. Pt took 2 days of levaquin prescribed by urgent care.  C. diff negative  Pt still with diarrhea. Has blood with wiping but no blood in stool.   - f/u GI PCR  - f/u O&P  - f/u stool culture  - start NS at 75 mL/hr  - GI consulted

## 2024-08-29 ENCOUNTER — TRANSCRIPTION ENCOUNTER (OUTPATIENT)
Age: 34
End: 2024-08-29

## 2024-08-29 VITALS
HEART RATE: 76 BPM | TEMPERATURE: 98 F | SYSTOLIC BLOOD PRESSURE: 109 MMHG | OXYGEN SATURATION: 94 % | RESPIRATION RATE: 18 BRPM | DIASTOLIC BLOOD PRESSURE: 69 MMHG

## 2024-08-29 LAB
A1C WITH ESTIMATED AVERAGE GLUCOSE RESULT: 6.9 % — HIGH (ref 4–5.6)
ALBUMIN SERPL ELPH-MCNC: 2.6 G/DL — LOW (ref 3.5–5)
ALP SERPL-CCNC: 65 U/L — SIGNIFICANT CHANGE UP (ref 40–120)
ALT FLD-CCNC: 36 U/L DA — SIGNIFICANT CHANGE UP (ref 10–60)
ANION GAP SERPL CALC-SCNC: 8 MMOL/L — SIGNIFICANT CHANGE UP (ref 5–17)
AST SERPL-CCNC: 16 U/L — SIGNIFICANT CHANGE UP (ref 10–40)
BILIRUB SERPL-MCNC: 0.4 MG/DL — SIGNIFICANT CHANGE UP (ref 0.2–1.2)
BUN SERPL-MCNC: 14 MG/DL — SIGNIFICANT CHANGE UP (ref 7–18)
CALCIUM SERPL-MCNC: 9.6 MG/DL — SIGNIFICANT CHANGE UP (ref 8.4–10.5)
CHLORIDE SERPL-SCNC: 109 MMOL/L — HIGH (ref 96–108)
CO2 SERPL-SCNC: 24 MMOL/L — SIGNIFICANT CHANGE UP (ref 22–31)
CREAT SERPL-MCNC: 0.91 MG/DL — SIGNIFICANT CHANGE UP (ref 0.5–1.3)
EGFR: 114 ML/MIN/1.73M2 — SIGNIFICANT CHANGE UP
ESTIMATED AVERAGE GLUCOSE: 151 MG/DL — HIGH (ref 68–114)
GLUCOSE SERPL-MCNC: 191 MG/DL — HIGH (ref 70–99)
GRAM STN FLD: ABNORMAL
HCT VFR BLD CALC: 34.6 % — LOW (ref 39–50)
HGB BLD-MCNC: 11.7 G/DL — LOW (ref 13–17)
MAGNESIUM SERPL-MCNC: 2.5 MG/DL — SIGNIFICANT CHANGE UP (ref 1.6–2.6)
MCHC RBC-ENTMCNC: 29 PG — SIGNIFICANT CHANGE UP (ref 27–34)
MCHC RBC-ENTMCNC: 33.8 GM/DL — SIGNIFICANT CHANGE UP (ref 32–36)
MCV RBC AUTO: 85.6 FL — SIGNIFICANT CHANGE UP (ref 80–100)
NRBC # BLD: 0 /100 WBCS — SIGNIFICANT CHANGE UP (ref 0–0)
PHOSPHATE SERPL-MCNC: 3.9 MG/DL — SIGNIFICANT CHANGE UP (ref 2.5–4.5)
PLATELET # BLD AUTO: 253 K/UL — SIGNIFICANT CHANGE UP (ref 150–400)
POTASSIUM SERPL-MCNC: 3.3 MMOL/L — LOW (ref 3.5–5.3)
POTASSIUM SERPL-SCNC: 3.3 MMOL/L — LOW (ref 3.5–5.3)
PROT SERPL-MCNC: 6.5 G/DL — SIGNIFICANT CHANGE UP (ref 6–8.3)
RBC # BLD: 4.04 M/UL — LOW (ref 4.2–5.8)
RBC # FLD: 13.7 % — SIGNIFICANT CHANGE UP (ref 10.3–14.5)
S PNEUM AG UR QL: NEGATIVE — SIGNIFICANT CHANGE UP
SODIUM SERPL-SCNC: 141 MMOL/L — SIGNIFICANT CHANGE UP (ref 135–145)
SPECIMEN SOURCE: SIGNIFICANT CHANGE UP
WBC # BLD: 6.37 K/UL — SIGNIFICANT CHANGE UP (ref 3.8–10.5)
WBC # FLD AUTO: 6.37 K/UL — SIGNIFICANT CHANGE UP (ref 3.8–10.5)

## 2024-08-29 PROCEDURE — 85027 COMPLETE CBC AUTOMATED: CPT

## 2024-08-29 PROCEDURE — 84145 PROCALCITONIN (PCT): CPT

## 2024-08-29 PROCEDURE — 93005 ELECTROCARDIOGRAM TRACING: CPT

## 2024-08-29 PROCEDURE — 83735 ASSAY OF MAGNESIUM: CPT

## 2024-08-29 PROCEDURE — 87070 CULTURE OTHR SPECIMN AEROBIC: CPT

## 2024-08-29 PROCEDURE — 86140 C-REACTIVE PROTEIN: CPT

## 2024-08-29 PROCEDURE — 81003 URINALYSIS AUTO W/O SCOPE: CPT

## 2024-08-29 PROCEDURE — 0225U NFCT DS DNA&RNA 21 SARSCOV2: CPT

## 2024-08-29 PROCEDURE — 87899 AGENT NOS ASSAY W/OPTIC: CPT

## 2024-08-29 PROCEDURE — 87077 CULTURE AEROBIC IDENTIFY: CPT

## 2024-08-29 PROCEDURE — 99285 EMERGENCY DEPT VISIT HI MDM: CPT | Mod: 25

## 2024-08-29 PROCEDURE — 85730 THROMBOPLASTIN TIME PARTIAL: CPT

## 2024-08-29 PROCEDURE — 87205 SMEAR GRAM STAIN: CPT

## 2024-08-29 PROCEDURE — 96374 THER/PROPH/DIAG INJ IV PUSH: CPT

## 2024-08-29 PROCEDURE — 86738 MYCOPLASMA ANTIBODY: CPT

## 2024-08-29 PROCEDURE — 87449 NOS EACH ORGANISM AG IA: CPT

## 2024-08-29 PROCEDURE — 96361 HYDRATE IV INFUSION ADD-ON: CPT

## 2024-08-29 PROCEDURE — 85025 COMPLETE CBC W/AUTO DIFF WBC: CPT

## 2024-08-29 PROCEDURE — 87045 FECES CULTURE AEROBIC BACT: CPT

## 2024-08-29 PROCEDURE — 99223 1ST HOSP IP/OBS HIGH 75: CPT

## 2024-08-29 PROCEDURE — 84100 ASSAY OF PHOSPHORUS: CPT

## 2024-08-29 PROCEDURE — 36415 COLL VENOUS BLD VENIPUNCTURE: CPT

## 2024-08-29 PROCEDURE — 83036 HEMOGLOBIN GLYCOSYLATED A1C: CPT

## 2024-08-29 PROCEDURE — 85610 PROTHROMBIN TIME: CPT

## 2024-08-29 PROCEDURE — 87040 BLOOD CULTURE FOR BACTERIA: CPT

## 2024-08-29 PROCEDURE — 87636 SARSCOV2 & INF A&B AMP PRB: CPT

## 2024-08-29 PROCEDURE — 87324 CLOSTRIDIUM AG IA: CPT

## 2024-08-29 PROCEDURE — 87389 HIV-1 AG W/HIV-1&-2 AB AG IA: CPT

## 2024-08-29 PROCEDURE — 80048 BASIC METABOLIC PNL TOTAL CA: CPT

## 2024-08-29 PROCEDURE — 80053 COMPREHEN METABOLIC PANEL: CPT

## 2024-08-29 PROCEDURE — 71275 CT ANGIOGRAPHY CHEST: CPT | Mod: MC

## 2024-08-29 PROCEDURE — 99232 SBSQ HOSP IP/OBS MODERATE 35: CPT | Mod: GC

## 2024-08-29 PROCEDURE — 71045 X-RAY EXAM CHEST 1 VIEW: CPT

## 2024-08-29 PROCEDURE — 87177 OVA AND PARASITES SMEARS: CPT

## 2024-08-29 PROCEDURE — 87046 STOOL CULTR AEROBIC BACT EA: CPT

## 2024-08-29 PROCEDURE — 87507 IADNA-DNA/RNA PROBE TQ 12-25: CPT

## 2024-08-29 PROCEDURE — 94640 AIRWAY INHALATION TREATMENT: CPT

## 2024-08-29 PROCEDURE — 99239 HOSP IP/OBS DSCHRG MGMT >30: CPT

## 2024-08-29 PROCEDURE — 83605 ASSAY OF LACTIC ACID: CPT

## 2024-08-29 PROCEDURE — 86713 LEGIONELLA ANTIBODY: CPT

## 2024-08-29 PROCEDURE — 83993 ASSAY FOR CALPROTECTIN FECAL: CPT

## 2024-08-29 PROCEDURE — 85652 RBC SED RATE AUTOMATED: CPT

## 2024-08-29 PROCEDURE — 96375 TX/PRO/DX INJ NEW DRUG ADDON: CPT

## 2024-08-29 RX ORDER — POTASSIUM CHLORIDE 10 MEQ
40 TABLET, EXT RELEASE, PARTICLES/CRYSTALS ORAL EVERY 4 HOURS
Refills: 0 | Status: COMPLETED | OUTPATIENT
Start: 2024-08-29 | End: 2024-08-29

## 2024-08-29 RX ORDER — GUAIFENESIN/DEXTROMETHORPHAN 100-10MG/5
10 SYRUP ORAL EVERY 6 HOURS
Refills: 0 | Status: DISCONTINUED | OUTPATIENT
Start: 2024-08-29 | End: 2024-08-29

## 2024-08-29 RX ORDER — CEFPODOXIME PROXETIL 100 MG/5ML
1 GRANULE, FOR SUSPENSION ORAL
Qty: 6 | Refills: 0
Start: 2024-08-29 | End: 2024-08-31

## 2024-08-29 RX ORDER — AZITHROMYCIN 500 MG/1
500 TABLET, FILM COATED ORAL DAILY
Refills: 0 | Status: COMPLETED | OUTPATIENT
Start: 2024-08-29 | End: 2024-08-29

## 2024-08-29 RX ORDER — FLUTICASONE PROPIONATE 50 UG/1
1 SPRAY, METERED NASAL
Qty: 1 | Refills: 0
Start: 2024-08-29 | End: 2024-09-04

## 2024-08-29 RX ORDER — FLUTICASONE PROPIONATE 50 UG/1
1 SPRAY, METERED NASAL EVERY 12 HOURS
Refills: 0 | Status: DISCONTINUED | OUTPATIENT
Start: 2024-08-29 | End: 2024-08-29

## 2024-08-29 RX ORDER — AZITHROMYCIN 500 MG/1
1 TABLET, FILM COATED ORAL
Qty: 2 | Refills: 0
Start: 2024-08-29 | End: 2024-08-30

## 2024-08-29 RX ORDER — PROMETHAZINE/DEXTROMETHORPHAN
5 SYRUP ORAL
Qty: 60 | Refills: 0
Start: 2024-08-29 | End: 2024-08-31

## 2024-08-29 RX ADMIN — Medication 40 MILLIEQUIVALENT(S): at 12:07

## 2024-08-29 RX ADMIN — Medication 40 MILLIEQUIVALENT(S): at 10:07

## 2024-08-29 RX ADMIN — ENOXAPARIN SODIUM 40 MILLIGRAM(S): 100 INJECTION SUBCUTANEOUS at 05:43

## 2024-08-29 RX ADMIN — ONDANSETRON 4 MILLIGRAM(S): 2 INJECTION, SOLUTION INTRAMUSCULAR; INTRAVENOUS at 11:07

## 2024-08-29 RX ADMIN — IPRATROPIUM BROMIDE AND ALBUTEROL SULFATE 3 MILLILITER(S): .5; 3 SOLUTION RESPIRATORY (INHALATION) at 03:03

## 2024-08-29 RX ADMIN — SODIUM CHLORIDE 75 MILLILITER(S): 9 INJECTION INTRAMUSCULAR; INTRAVENOUS; SUBCUTANEOUS at 11:00

## 2024-08-29 RX ADMIN — AZITHROMYCIN 500 MILLIGRAM(S): 500 TABLET, FILM COATED ORAL at 14:20

## 2024-08-29 RX ADMIN — Medication 100 MILLIGRAM(S): at 05:43

## 2024-08-29 RX ADMIN — ACETAMINOPHEN 650 MILLIGRAM(S): 325 TABLET ORAL at 11:07

## 2024-08-29 RX ADMIN — GUAIFENESIN 1200 MILLIGRAM(S): 100 LIQUID ORAL at 05:44

## 2024-08-29 RX ADMIN — IPRATROPIUM BROMIDE AND ALBUTEROL SULFATE 3 MILLILITER(S): .5; 3 SOLUTION RESPIRATORY (INHALATION) at 09:54

## 2024-08-29 NOTE — DISCHARGE NOTE NURSING/CASE MANAGEMENT/SOCIAL WORK - PATIENT PORTAL LINK FT
You can access the FollowMyHealth Patient Portal offered by [facility] by registering at the following website: [email or web address]. By joining [employer]’s FollowMyHealth portal, you will also be able to view your health information using other applications (apps) compatible with our system.

## 2024-08-29 NOTE — CONSULT NOTE ADULT - SUBJECTIVE AND OBJECTIVE BOX
PULMONARY SERVICE INITIAL CONSULT NOTE    HPI:  Patient is a 33y M, PMHx of PE 2/2 covid. Presented to the ED with c/o shortness of breath, cough, fevers, night sweats, pleuritic chest pain starting sunday. He went to the urgent care on sunday when the symptoms started and was prescribed levoquin which he took for 2 days and stopped after noticing worsening of his symptoms. His chest pain is diffuse and pleuritic in nature it occurs on coughing. He used to be able to walk without experiencing SOB however since symptoms have started he is currently able to walk 2-3 blocks. Since 2 days ago he started having fevers w/ chills at home Tmax of 103.2 he tried tylenol and motrin however they did not keep his fevers down. Starting yesterday he he started having very loose diarrhea, non bloody, about 4 episodes yesterday (this was a day after stopping levoquin), denies abddominal pain however notes decreased appetite.   His daughter and wife were both sick, daughter had an URTI and wife had viral conjunctivitis last week.      REVIEW OF SYSTEMS:  Constitutional: No fever, weight loss or fatigue  Eyes: No eye pain, visual disturbances, or discharge  ENMT:  No difficulty hearing, tinnitus, vertigo; No sinus or throat pain  Neck: No pain, stiffness or neck swelling  Respiratory: see HPI  Cardiovascular: No chest pain, palpitations, dizziness or leg swelling  Gastrointestinal: No abdominal or epigastric pain. No nausea, vomiting or hematemesis; No diarrhea or constipation. No melena or hematochezia.  Genitourinary: No dysuria, frequency, hematuria or incontinence  Neurological: No headaches, memory loss, loss of strength, numbness or tremors  Skin: No itching, burning, rashes or lesions   Lymph Nodes: No enlarged glands  Endocrine: No heat or cold intolerance; No hair loss  Musculoskeletal: No joint pain or swelling; No muscle, back or extremity pain  Psychiatric: No depression, anxiety, mood swings or difficulty sleeping  Heme/Lymph: No easy bruising or bleeding gums  Allergy and Immunologic: No hives or eczema    PAST MEDICAL & SURGICAL HISTORY:  Pulmonary embolism      No significant past surgical history    FAMILY HISTORY:  Denies FHx lung disease in M/F    SOCIAL HISTORY:  Smoking Status: [ ] Current, [ ] Former, [X] Never  Pack Years:    MEDICATIONS:  Pulmonary:  albuterol/ipratropium for Nebulization 3 milliLiter(s) Nebulizer every 6 hours  guaifenesin/dextromethorphan Oral Liquid 10 milliLiter(s) Oral every 6 hours PRN  hydrocodone/homatropine Syrup 5 milliLiter(s) Oral every 6 hours PRN    Antimicrobials:  azithromycin   Tablet 500 milliGRAM(s) Oral daily  cefTRIAXone   IVPB 1000 milliGRAM(s) IV Intermittent every 24 hours    Anticoagulants:  enoxaparin Injectable 40 milliGRAM(s) SubCutaneous every 24 hours    Onc:    GI/:  aluminum hydroxide/magnesium hydroxide/simethicone Suspension 30 milliLiter(s) Oral every 4 hours PRN    Endocrine:    Cardiac:    Other Medications:  acetaminophen     Tablet .. 650 milliGRAM(s) Oral every 6 hours PRN  fluticasone propionate 50 MICROgram(s)/spray Nasal Spray 1 Spray(s) Both Nostrils every 12 hours  melatonin 3 milliGRAM(s) Oral at bedtime PRN  ondansetron Injectable 4 milliGRAM(s) IV Push every 8 hours PRN  potassium chloride    Tablet ER 40 milliEquivalent(s) Oral every 4 hours  sodium chloride 0.9%. 1000 milliLiter(s) IV Continuous <Continuous>    Allergies    No Known Allergies    Intolerances    Vital Signs Last 24 Hrs  T(C): 36.8 (29 Aug 2024 05:20), Max: 36.9 (28 Aug 2024 13:42)  T(F): 98.3 (29 Aug 2024 05:20), Max: 98.4 (28 Aug 2024 13:42)  HR: 86 (29 Aug 2024 05:20) (86 - 97)  BP: 100/63 (29 Aug 2024 05:20) (100/63 - 119/71)  BP(mean): 75 (29 Aug 2024 05:20) (75 - 78)  RR: 18 (29 Aug 2024 05:20) (18 - 20)  SpO2: 93% (29 Aug 2024 05:20) (93% - 94%)    Parameters below as of 29 Aug 2024 05:20  Patient On (Oxygen Delivery Method): room air  O2 Flow (L/min): 1    PHYSICAL EXAM:  Constitutional: anxious but non-toxic appearing man resting semirecumbent in bed; NAD  Head: atraumatic  EENT: PERRL, anicteric sclera; oropharynx clear, MMM  Neck: supple, no appreciable JVD  Respiratory: CTA B/L; no W/R/R; respirations appear non-labored  Cardiovascular: +S1/S2, RRR  Gastrointestinal: soft, NT/ND  Extremities: WWP; no edema, clubbing or cyanosis  Vascular: 2+ radial pulses B/L  Neurological: awake and alert; MAN    LABS:  CBC Full  -  ( 29 Aug 2024 06:10 )  WBC Count : 6.37 K/uL  RBC Count : 4.04 M/uL  Hemoglobin : 11.7 g/dL  Hematocrit : 34.6 %  Platelet Count - Automated : 253 K/uL  Mean Cell Volume : 85.6 fl  Mean Cell Hemoglobin : 29.0 pg  Mean Cell Hemoglobin Concentration : 33.8 gm/dL  Auto Neutrophil # : x  Auto Lymphocyte # : x  Auto Monocyte # : x  Auto Eosinophil # : x  Auto Basophil # : x  Auto Neutrophil % : x  Auto Lymphocyte % : x  Auto Monocyte % : x  Auto Eosinophil % : x  Auto Basophil % : x    08-29    141  |  109<H>  |  14  ----------------------------<  191<H>  3.3<L>   |  24  |  0.91    Ca    9.6      29 Aug 2024 06:10  Phos  3.9     08-29  Mg     2.5     08-29    TPro  6.5  /  Alb  2.6<L>  /  TBili  0.4  /  DBili  x   /  AST  16  /  ALT  36  /  AlkPhos  65  08-29    PT/INR - ( 27 Aug 2024 20:38 )   PT: 13.2 sec;   INR: 1.16 ratio         PTT - ( 27 Aug 2024 20:38 )  PTT:33.8 sec      Urinalysis Basic - ( 29 Aug 2024 06:10 )    Color: x / Appearance: x / SG: x / pH: x  Gluc: 191 mg/dL / Ketone: x  / Bili: x / Urobili: x   Blood: x / Protein: x / Nitrite: x   Leuk Esterase: x / RBC: x / WBC x   Sq Epi: x / Non Sq Epi: x / Bacteria: x    RADIOLOGY & ADDITIONAL STUDIES (personally reviewed):  < from: CT Angio Chest PE Protocol w/ IV Cont (08.27.24 @ 22:27) >  FINDINGS:    LUNGS AND LARGE AIRWAYS: Patent central airways. Scattered branching   "tree in bud" and groundglass nodular opacities throughout the left   upper, lingula and lowerlobes. Scattered ground glass nodular opacities   in the right lower lobe to a lesser extent.  PLEURA: No pleural effusion.  VESSELS: Limited evaluation of the distal subsegmental pulmonary arteries   secondary to motion artifact. No pulmonary embolism in the main, lobar,   segmental or proximal subsegmental pulmonary arteries.  HEART: Heart size is normal. No pericardial effusion.  MEDIASTINUM AND OPAL: No lymphadenopathy. Prominent AP window and left   hilar lymph nodes which do not meet the size criteria for enlargement and   likely reactive.  CHEST WALL AND LOWER NECK: Within normal limits.  VISUALIZED UPPER ABDOMEN: Hepatic steatosis.  BONES: Within normal limits.    IMPRESSION:  1. Limited evaluation of the distal subsegmental pulmonaryarteries   secondary to motion artifact. No pulmonary embolism in the main, lobar,   segmental or proximal subsegmental pulmonary arteries.  2. Scattered branching "tree in bud" and groundglass nodular opacities   throughout the left upper, lingula and lower lobes and, to a lesser   extent, in the right lower lobe likely infectious in etiology.

## 2024-08-29 NOTE — DISCHARGE NOTE PROVIDER - PROVIDER TOKENS
PROVIDER:[TOKEN:[[id number]],FOLLOWUP:[2 weeks]],PROVIDER:[TOKEN:[[id number]],FOLLOWUP:[2 weeks]]

## 2024-08-29 NOTE — PROGRESS NOTE ADULT - SUBJECTIVE AND OBJECTIVE BOX
[facility] Progress Note    Patient is a 33y old  Male who presents with a chief complaint of Sepsis, diarrhea (29 Aug 2024 09:52)          HPI:  Patient is a 33y M, PMHx of PE 2/2 covid. Presented to the ED with c/o shortness of breath, cough, fevers, night sweats, pleuritic chest pain starting sunday. He went to the urgent care on sunday when the symptoms started and was prescribed levoquin which he took for 2 days and stopped after noticing worsening of his symptoms. His chest pain is diffuse and pleuritic in nature it occurs on coughing. He used to be able to walk without experiencing SOB however since symptoms have started he is currently able to walk 2-3 blocks. Since 2 days ago he started having fevers w/ chills at home Tmax of 103.2 he tried tylenol and motrin however they did not keep his fevers down. Starting yesterday he he started having very loose diarrhea, non bloody, about 4 episodes yesterday (this was a day after stopping levoquin), denies abddominal pain however notes decreased appetite.   His daughter and wife were both sick, daughter had an URTI and wife had viral conjunctivitis last week.  (27 Aug 2024 23:33)    GI HPI  Patient endorses he has had " GI issues " all his life which have worsened in the past couple of years, his last colitis flare was 10 m ago which was treated at [facility]. Patient reports most recent EGD/ Cx was about 1 year ago - endorses that there were no " major issues on the report"   Patient states he started to have watery diarrhea , 3-4 episodes per a day after starting ABx ( levaquin) this sunday for URTI which he stopped after 2 doses. Diarrhea has persisted, 3 episodes this morning. Patient is also nauseous, reports 2 episodes of NBNB vomiting yesterday, now resolved. Associated with fever and chills  Reports diffuse abdominal pain x 2 days - described as generalized discomfort, no relieving factors. Patient reports that he intermittently observes blood while wiping with toilet paper.   Patient reports that his clothes have become loose in the past few months     Of note - family Hx of stomach cancer in the maternal grand father        GI INTERVAL HPI/OVERNIGHT EVENTS: no new complaints    MEDICATIONS  (STANDING):  albuterol/ipratropium for Nebulization 3 milliLiter(s) Nebulizer every 6 hours  azithromycin   Tablet 500 milliGRAM(s) Oral daily  cefTRIAXone   IVPB 1000 milliGRAM(s) IV Intermittent every 24 hours  enoxaparin Injectable 40 milliGRAM(s) SubCutaneous every 24 hours  fluticasone propionate 50 MICROgram(s)/spray Nasal Spray 1 Spray(s) Both Nostrils every 12 hours  guaiFENesin ER 1200 milliGRAM(s) Oral every 12 hours  potassium chloride    Tablet ER 40 milliEquivalent(s) Oral every 4 hours  sodium chloride 0.9%. 1000 milliLiter(s) (75 mL/Hr) IV Continuous <Continuous>    MEDICATIONS  (PRN):  acetaminophen     Tablet .. 650 milliGRAM(s) Oral every 6 hours PRN Temp greater or equal to 38C (100.4F), Mild Pain (1 - 3)  aluminum hydroxide/magnesium hydroxide/simethicone Suspension 30 milliLiter(s) Oral every 4 hours PRN Dyspepsia  hydrocodone/homatropine Syrup 5 milliLiter(s) Oral every 6 hours PRN Cough  melatonin 3 milliGRAM(s) Oral at bedtime PRN Insomnia  ondansetron Injectable 4 milliGRAM(s) IV Push every 8 hours PRN Nausea and/or Vomiting      __________________________________________________  REVIEW OF SYSTEMS:        ________________________________________________  PHYSICAL EXAM    Vital Signs Last 24 Hrs  T(C): 36.8 (29 Aug 2024 05:20), Max: 36.9 (28 Aug 2024 13:42)  T(F): 98.3 (29 Aug 2024 05:20), Max: 98.4 (28 Aug 2024 13:42)  HR: 86 (29 Aug 2024 05:20) (86 - 97)  BP: 100/63 (29 Aug 2024 05:20) (100/63 - 119/71)  BP(mean): 75 (29 Aug 2024 05:20) (75 - 78)  RR: 18 (29 Aug 2024 05:20) (18 - 20)  SpO2: 93% (29 Aug 2024 05:20) (93% - 94%)    Parameters below as of 29 Aug 2024 05:20  Patient On (Oxygen Delivery Method): room air  O2 Flow (L/min): 1      _________________________________________________  LABS:                        11.7   6.37  )-----------( 253      ( 29 Aug 2024 06:10 )             34.6     08-29    141  |  109<H>  |  14  ----------------------------<  191<H>  3.3<L>   |  24  |  0.91    Ca    9.6      29 Aug 2024 06:10  Phos  3.9     08-29  Mg     2.5     08-29    TPro  6.5  /  Alb  2.6<L>  /  TBili  0.4  /  DBili  x   /  AST  16  /  ALT  36  /  AlkPhos  65  08-29    PT/INR - ( 27 Aug 2024 20:38 )   PT: 13.2 sec;   INR: 1.16 ratio         PTT - ( 27 Aug 2024 20:38 )  PTT:33.8 sec  Urinalysis Basic - ( 29 Aug 2024 06:10 )    Color: x / Appearance: x / SG: x / pH: x  Gluc: 191 mg/dL / Ketone: x  / Bili: x / Urobili: x   Blood: x / Protein: x / Nitrite: x   Leuk Esterase: x / RBC: x / WBC x   Sq Epi: x / Non Sq Epi: x / Bacteria: x      CAPILLARY BLOOD GLUCOSE        SARS-CoV-2: NotDetec (28 Aug 2024 04:23)              RADIOLOGY & ADDITIONAL TESTS:                     [facility] Progress Note    Patient is a 33y old  Male who presents with a chief complaint of Sepsis, diarrhea (29 Aug 2024 09:52)          HPI:  Patient is a 33y M, PMHx of PE 2/2 covid. Presented to the ED with c/o shortness of breath, cough, fevers, night sweats, pleuritic chest pain starting sunday. He went to the urgent care on sunday when the symptoms started and was prescribed levoquin which he took for 2 days and stopped after noticing worsening of his symptoms. His chest pain is diffuse and pleuritic in nature it occurs on coughing. He used to be able to walk without experiencing SOB however since symptoms have started he is currently able to walk 2-3 blocks. Since 2 days ago he started having fevers w/ chills at home Tmax of 103.2 he tried tylenol and motrin however they did not keep his fevers down. Starting yesterday he he started having very loose diarrhea, non bloody, about 4 episodes yesterday (this was a day after stopping levoquin), denies abddominal pain however notes decreased appetite.   His daughter and wife were both sick, daughter had an URTI and wife had viral conjunctivitis last week.  (27 Aug 2024 23:33)    GI HPI  Patient endorses he has had " GI issues " all his life which have worsened in the past couple of years, his last colitis flare was 10 m ago which was treated at [facility]. Patient reports most recent EGD/ Cx was about 1 year ago - endorses that there were no " major issues on the report"   Patient states he started to have watery diarrhea , 3-4 episodes per a day after starting ABx ( levaquin) this sunday for URTI which he stopped after 2 doses. Diarrhea has persisted, 3 episodes this morning. Patient is also nauseous, reports 2 episodes of NBNB vomiting yesterday, now resolved. Associated with fever and chills  Reports diffuse abdominal pain x 2 days - described as generalized discomfort, no relieving factors. Patient reports that he intermittently observes blood while wiping with toilet paper.   Patient reports that his clothes have become loose in the past few months     Of note - family Hx of stomach cancer in the maternal grand father       GI INTERVAL HPI/OVERNIGHT EVENTS: no new complaints, patient continues to have watery diarrhea, no longer explosive- total 6 episodes yesterday and 1 episode this morning.     MEDICATIONS  (STANDING):  albuterol/ipratropium for Nebulization 3 milliLiter(s) Nebulizer every 6 hours  azithromycin   Tablet 500 milliGRAM(s) Oral daily  cefTRIAXone   IVPB 1000 milliGRAM(s) IV Intermittent every 24 hours  enoxaparin Injectable 40 milliGRAM(s) SubCutaneous every 24 hours  fluticasone propionate 50 MICROgram(s)/spray Nasal Spray 1 Spray(s) Both Nostrils every 12 hours  guaiFENesin ER 1200 milliGRAM(s) Oral every 12 hours  potassium chloride    Tablet ER 40 milliEquivalent(s) Oral every 4 hours  sodium chloride 0.9%. 1000 milliLiter(s) (75 mL/Hr) IV Continuous <Continuous>    MEDICATIONS  (PRN):  acetaminophen     Tablet .. 650 milliGRAM(s) Oral every 6 hours PRN Temp greater or equal to 38C (100.4F), Mild Pain (1 - 3)  aluminum hydroxide/magnesium hydroxide/simethicone Suspension 30 milliLiter(s) Oral every 4 hours PRN Dyspepsia  hydrocodone/homatropine Syrup 5 milliLiter(s) Oral every 6 hours PRN Cough  melatonin 3 milliGRAM(s) Oral at bedtime PRN Insomnia  ondansetron Injectable 4 milliGRAM(s) IV Push every 8 hours PRN Nausea and/or Vomiting      __________________________________________________  REVIEW OF SYSTEMS:    CONSTITUTIONAL:  +weight loss, +fever, + chills, No weakness or+ fatigue.  HEENT:  Eyes:  No visual loss, blurred vision, double vision or yellow sclerae. Ears, Nose, Throat:  No hearing loss, sneezing, congestion, runny nose or sore throat.  SKIN:  No rash or itching.  CARDIOVASCULAR:  No chest pain, chest pressure or chest discomfort. No palpitations or edema.  RESPIRATORY:  No shortness of breath, cough or sputum.  GASTROINTESTINAL:  as above + diarrhea - watery   GENITOURINARY:  No dysuria, hematuria, urinary frequency  NEUROLOGICAL:  No headache, dizziness, syncope, paralysis, ataxia, numbness or tingling in the extremities. No change in bowel or bladder control.  MUSCULOSKELETAL:  No muscle, back pain, joint pain or stiffness.  HEMATOLOGIC:  No anemia, bleeding or bruising.  LYMPHATICS:  No enlarged nodes. No history of splenectomy.  PSYCHIATRIC:  No history of depression or anxiety.  ENDOCRINOLOGIC:  No reports of sweating, cold or heat intolerance. No polyuria or polydipsia.        ________________________________________________  PHYSICAL EXAM    Vital Signs Last 24 Hrs  T(C): 36.8 (29 Aug 2024 05:20), Max: 36.9 (28 Aug 2024 13:42)  T(F): 98.3 (29 Aug 2024 05:20), Max: 98.4 (28 Aug 2024 13:42)  HR: 86 (29 Aug 2024 05:20) (86 - 97)  BP: 100/63 (29 Aug 2024 05:20) (100/63 - 119/71)  BP(mean): 75 (29 Aug 2024 05:20) (75 - 78)  RR: 18 (29 Aug 2024 05:20) (18 - 20)  SpO2: 93% (29 Aug 2024 05:20) (93% - 94%)    Parameters below as of 29 Aug 2024 05:20  Patient On (Oxygen Delivery Method): room air  O2 Flow (L/min): 1      GEN: NAD, normocephalic  CVS: S1S2+  CHEST: clear to auscultation, saturating well on  2 L NC   ABD: obese abdomen , soft , non distended , minimal tenderness to palpation more so in RLL, LLQ, NO guarding / rigidity, BS increased   EXTR: no cyanosis, no clubbing, no edema  NEURO: Awake and alert; oriented x 3   SKIN:  warm;  non icteric    _________________________________________________  LABS:                        11.7   6.37  )-----------( 253      ( 29 Aug 2024 06:10 )             34.6     08-29    141  |  109<H>  |  14  ----------------------------<  191<H>  3.3<L>   |  24  |  0.91    Ca    9.6      29 Aug 2024 06:10  Phos  3.9     08-29  Mg     2.5     08-29    TPro  6.5  /  Alb  2.6<L>  /  TBili  0.4  /  DBili  x   /  AST  16  /  ALT  36  /  AlkPhos  65  08-29    PT/INR - ( 27 Aug 2024 20:38 )   PT: 13.2 sec;   INR: 1.16 ratio         PTT - ( 27 Aug 2024 20:38 )  PTT:33.8 sec  Urinalysis Basic - ( 29 Aug 2024 06:10 )    Color: x / Appearance: x / SG: x / pH: x  Gluc: 191 mg/dL / Ketone: x  / Bili: x / Urobili: x   Blood: x / Protein: x / Nitrite: x   Leuk Esterase: x / RBC: x / WBC x   Sq Epi: x / Non Sq Epi: x / Bacteria: x      CAPILLARY BLOOD GLUCOSE        SARS-CoV-2: NotDetec (28 Aug 2024 04:23)              RADIOLOGY & ADDITIONAL TESTS:

## 2024-08-29 NOTE — CONSULT NOTE ADULT - CONSULT REASON
Hx of ? IBD not on any medications, p/w watery diarrhea x 3 days
Sepsis, diarrhea
AHRF, abnormal CT/PNA

## 2024-08-29 NOTE — PROGRESS NOTE ADULT - ASSESSMENT
Patient is a 33yM w/ PMHx of PE 2/2 covid 2020, ? IBS vs IBD presented with sob, fever w/ chills, dry cough and pleuritis chest pain since 3 days ago with new onset of watery diarrhea. In the ED vs: afeb, tachycardic to 90s, RR 24 sat 93-95% on RA. wbc wnl, CTa chest negative for PE, shows tree in bud appearance and ground glass opacities in bilateral lung fields more in Left > right. Patient is admitted for sepsis 2/2 PNA and work up diarrhea, c diff negative. GI consulted for management of diarrhea, possible etiologies are infectious vs inflammatory vs iatrogenic ( ABx induced).     # Diarrhea - watery  x 2-3 days   # Hx of colitis in the past ? infectious vs inflammatory vs iatrogenic   # sepsis 2/2 PNA     RECOMMENDATIONS :   No abdominal imaging warranted at this time   obtain OP Cx, EGD reports from 2023  ( patient follows up with GI Dr. Tomasz Anguiano ) patient is unsure of his Dx ( ? IBS vs IBD)   ESR, CRP noted to be elevated    C diff negative, GI PCR negative    f/u stool studies - Stool Cx, O&P, fecal calprotectin     rest of Rx as per primary team       Thanks for this consult. GI will continue to follow.   GI eval not the reason this pt is admitted; diarrhea not a limiting factor to discharge.  THIS NOTE IS INCOMPLETE TILL SIGNED BY THE ATTENDING  Patient is a 33yM w/ PMHx of PE 2/2 covid 2020, ? IBS vs IBD presented with sob, fever w/ chills, dry cough and pleuritis chest pain since 3 days ago with new onset of watery diarrhea. In the ED vs: afeb, tachycardic to 90s, RR 24 sat 93-95% on RA. wbc wnl, CTa chest negative for PE, shows tree in bud appearance and ground glass opacities in bilateral lung fields more in Left > right. Patient is admitted for sepsis 2/2 PNA and work up diarrhea, c diff negative. GI consulted for management of diarrhea, possible etiologies are infectious vs inflammatory vs iatrogenic ( ABx induced), pending stool study results.     # Diarrhea - watery  x 2-3 days   # Hx of colitis in the past ? infectious vs inflammatory vs iatrogenic   # sepsis 2/2 PNA     RECOMMENDATIONS :   No abdominal imaging warranted at this time   ESR, CRP noted to be elevated    C diff negative, GI PCR negative    [ ] obtain OP Cx, EGD reports from 2023  ( patient follows up with GI Dr. Tomasz Anguiano ) patient is unsure of his Dx ( ? IBS vs IBD)   [ ] f/u stool studies - Stool Cx, O&P, fecal calprotectin     rest of Rx as per primary team, appreciate IDx recommendations       Thanks for this consult. GI will continue to follow.   GI eval not the reason this pt is admitted; diarrhea not a limiting factor to discharge.  THIS NOTE IS INCOMPLETE TILL SIGNED BY THE ATTENDING

## 2024-08-29 NOTE — DISCHARGE NOTE PROVIDER - NSDCCPCAREPLAN_GEN_ALL_CORE_FT
PRINCIPAL DISCHARGE DIAGNOSIS  Diagnosis: Sepsis  Assessment and Plan of Treatment: You presented with cough, shortness of breath and subjective fevers. Your CT was concerning for pneumonia likely viral in etiology. Your blood culture is negative for bacteria Your GI PCR and Cdiff are negative for any infectious etiology. You were given IV antibiotics and was evaluated by infectious disease specialist.  You are to complete cefpodoxime 200mg twice a day for 3 more days  Continue azithromycin 500mg daily for 2 more days  Call you Health care provider upon arrival home to make a one week follow up appointment.  If you develop fever, chills, malaise, or change in mental status call your Health Care Provider or go to the Emergency Department.  Nutrition is important, eat small frequent meals to help ensure you get adequate calories.  Do not stay in bed all day!  Increase your activity daily as tolerated.        SECONDARY DISCHARGE DIAGNOSES  Diagnosis: Diarrhea  Assessment and Plan of Treatment: You presented with diarrhea likely due to recent antibiotic use. You were evaluated by gastroenterologist  You did not report any blood in stool  Continue oral hydration at home  Follow up with your primary care provider or gastroenterologist for further management    Diagnosis: Pneumonia  Assessment and Plan of Treatment: You are to complete cefpodoxime 200mg twice a day for 2 more days  You were evaluated by pulmonologist  Continue taking cough medicine at home as needed  Follow up with primary care provider or pulmonologist  Pneumonia is a lung infection that can cause a fever, cough, and trouble breathing.  Nutrition is important, eat small frequent meals.  Get lots of rest and drink fluids.  If your cough worsens, you develop fever greater than 101', you have shaking chills, a fast heartbeat, trouble breathing and/or feel your are breathing much faster than usual, call your healthcare provider.  Make sure you wash your hands frequently.

## 2024-08-29 NOTE — DISCHARGE NOTE PROVIDER - NSFOLLOWUPCLINICS_GEN_ALL_ED_FT
[facility]  Internal Medicine  [address] [place] [address]  Phone: [phone]  Fax: [phone]  Follow Up Time: 2 weeks

## 2024-08-29 NOTE — PROGRESS NOTE ADULT - ASSESSMENT
Patient is a 33y M, PMHx of PE 2/2 covid. Presented to the ED with c/o shortness of breath, cough, fevers, night sweats, pleuritic chest pain starting sunday (8/25). ID was consulted for the pt being septic on admission (8/27). At the time of ID consult, pt complained of chest pain on deep inspiration, SOB (on 3L NC), productive cough with a whitish-yellow sputum, generalized abdominal pain and diarrhea since yesterday (non bloody). Denies fevers, chills, body aches currently but still with CP associated with coughing.      #Sepsis likely 2/2 PNA vs viral pneumonia--p/w sob on exertion; RVP: negative; CTA: abnormal and may represent old findings (as described above). BCs negative to date.   Strep Ag , procalcitonin, Mycoplasma negative   - cont IV CTX for now (day 2)  - c/w Azithromycin PO (day 2)  - f/u Legionella Ag  - tylenol prn  --f/u final BC results    #Diarrhea likely 2/2 viral gastroenteritis vs antibiotic use although Cdif neg  - f/u GI PCR panel    #Hyperglycemia-- pt has no known h/o DM but would be concerned about elevated glu   --obtain HbA1c  --follow glu    Chart reviewed, including notes/labs/imaging; pt examined at the bedside; discussed Dx/management of sepsis with the pt and the medical team; provided coordination of care re ID issues with Dr. De La Torre.      Addendum: Chart note reporting ESBL Klebsiella in the BC for this patient is incorrect, which I verified through the [facility]. The ESBL Klebsiella bacteremia is for another patient on 6S. I informed Dr. De La Torre and the NM (Tim Gomez) of the error, especially to make sure that the patient who is bacteremic is appropriately treated. I also contacted Dr. Gutierrez, who is the attending for the patient with the bacteremia, to let him know.    Patient is a 33y M, PMHx of PE 2/2 covid. Presented to the ED with c/o shortness of breath, cough, fevers, night sweats, pleuritic chest pain starting sunday (8/25). ID was consulted for the pt being septic on admission (8/27). At the time of ID consult, pt complained of chest pain on deep inspiration, SOB (on 3L NC), productive cough with a whitish-yellow sputum, generalized abdominal pain and diarrhea since yesterday (non bloody). Denies fevers, chills, body aches currently but still with CP associated with coughing.      #Sepsis likely 2/2 PNA vs viral pneumonia--p/w sob on exertion; RVP: negative; CTA: abnormal and may represent old findings (as described above). BCs negative to date.   Strep Ag , procalcitonin, Mycoplasma negative   - cont IV CTX for now (day 2)  - c/w Azithromycin PO (day 2)  - f/u Legionella Ag  - tylenol prn  --f/u final BC results    #Diarrhea likely 2/2 viral gastroenteritis vs antibiotic use although Cdif neg  - f/u GI PCR panel    #Hyperglycemia-- pt has no known h/o DM but would be concerned about elevated glu   --obtain HbA1c  --follow glu        Addendum: Chart note reporting ESBL Klebsiella in the BC for this patient is incorrect, which I verified through the [facility]. The ESBL Klebsiella bacteremia is for another patient on 6S. I informed Dr. De La Torre and the NM (Tim Gomez) of the error, especially to make sure that the patient who is bacteremic is appropriately treated. I also contacted Dr. Gutierrez, who is the attending for the patient with the bacteremia, to let him know.    Patient is a 33y M, PMHx of PE 2/2 covid. Presented to the ED with c/o shortness of breath, cough, fevers, night sweats, pleuritic chest pain starting sunday (8/25). ID was consulted for the pt being septic on admission (8/27). At the time of ID consult, pt complained of chest pain on deep inspiration, SOB (on 3L NC), productive cough with a whitish-yellow sputum, generalized abdominal pain and diarrhea since yesterday (non bloody). Denies fevers, chills, body aches currently but still with CP associated with coughing.      #Sepsis likely 2/2 PNA vs viral pneumonia--p/w sob on exertion; RVP: negative; CTA: abnormal and may represent old findings (as described above). BCs negative to date.   Strep Ag , procalcitonin, Mycoplasma negative   - cont IV CTX for now (day 2)  - c/w Azithromycin PO (day 2)  - f/u Legionella Ag  - tylenol prn  - gram stain results of sputum as above    #Diarrhea likely 2/2 viral gastroenteritis vs antibiotic use although Cdif neg  GI PCR panel is negative    #Hyperglycemia-- pt has no known h/o DM but would be concerned about elevated glu   --obtain HbA1c  --follow glu        Addendum: Chart note reporting ESBL Klebsiella in the BC for this patient is incorrect, which I verified through the [facility]. The ESBL Klebsiella bacteremia is for another patient on 6S. I informed Dr. De La Torre and the NM (Tim Gomez) of the error, especially to make sure that the patient who is bacteremic is appropriately treated. I also contacted Dr. Gutierrez, who is the attending for the patient with the bacteremia, to let him know.    Patient is a 33y M, PMHx of PE 2/2 covid. Presented to the ED with c/o shortness of breath, cough, fevers, night sweats, pleuritic chest pain starting sunday (8/25). ID was consulted for the pt being septic on admission (8/27). At the time of ID consult, pt complained of chest pain on deep inspiration, SOB (on 3L NC), productive cough with a whitish-yellow sputum, generalized abdominal pain and diarrhea since yesterday (non bloody). Denies fevers, chills, body aches currently but still with CP associated with coughing. BC neg to date.       #Sepsis likely 2/2 PNA -- likely viral pneumonia--p/w sob on exertion; RVP: negative; CTA: abnormal and may represent old findings (as described above). BCs negative to date. Sputum Cx likely oral mirta. Strep Ag , procalcitonin, Mycoplasma negative   - d/c ceftriaxone  --start cefpodoxime 400mg p.o. bid for 5d  - complete 3d additional days of p.o. Azithromycin   - f/u Legionella Ag  - tylenol prn  - gram stain results of sputum as above    #Diarrhea likely 2/2 viral gastroenteritis vs antibiotic use although Cdif neg. GI PCR neg    #Hyperglycemia-- pt has no known h/o DM but would be concerned about elevated glu   --obtain HbA1c  --follow glu        Addendum: Chart note reporting ESBL Klebsiella in the BC for this patient is incorrect, which I verified through the [facility]. The ESBL Klebsiella bacteremia is for another patient on 6S. I informed Dr. De La Torre and the NM (Tim Gomez) of the error, especially to make sure that the patient who is bacteremic is appropriately treated. I also contacted Dr. Gutierrez, who is the attending for the patient with the bacteremia, to let him know.    Patient is a 33y M, PMHx of PE 2/2 covid. Presented to the ED with c/o shortness of breath, cough, fevers, night sweats, pleuritic chest pain starting sunday (8/25). ID was consulted for the pt being septic on admission (8/27). At the time of ID consult, pt complained of chest pain on deep inspiration, SOB (on 3L NC), productive cough with a whitish-yellow sputum, generalized abdominal pain and diarrhea since yesterday (non bloody). Denies fevers, chills, body aches currently but still with CP associated with coughing. BC neg to date.       #Sepsis likely 2/2 PNA -- likely viral pneumonia--p/w sob on exertion; RVP: negative; CTA: abnormal and may represent old findings (as described above). BCs negative to date. Sputum Cx likely oral mirta. Strep Ag , procalcitonin, Mycoplasma negative   - d/c ceftriaxone  --start cefpodoxime 400mg p.o. bid for 5d  - complete 3d additional days of p.o. Azithromycin   - f/u Legionella Ag  - tylenol prn  - gram stain results of sputum as above    #Diarrhea likely 2/2 viral gastroenteritis vs antibiotic use although Cdif neg. GI PCR neg    #Hyperglycemia-- pt has no known h/o DM but would be concerned about elevated glu   --obtain HbA1c  --follow glu    Chart reviewed, including additional notes/labs/Cx; pt re-evaluated at the bedside; discussed the Dx/management of pneu with the pt and the medical team; provided coordination of care re ID issues with Dr. De La Torre. Please call again if needed.

## 2024-08-29 NOTE — DISCHARGE NOTE PROVIDER - CARE PROVIDER_API CALL
Kenny Riddle  Critical Care Medicine  [address] [facility], [address]  [place] [address]  Phone: [phone]  Fax: [phone]  Follow Up Time: 2 weeks    Paulette Orourke  Gastroenterology  [address] [place] [address], [address]  [place] [address]  Phone: [phone]  Fax: [phone]  Follow Up Time: 2 weeks

## 2024-08-29 NOTE — DISCHARGE NOTE PROVIDER - ATTENDING DISCHARGE PHYSICAL EXAMINATION:
S: no sob, fever, chills  O:  Vital Signs Last 24 Hrs  T(C): 36.4 (29 Aug 2024 12:30), Max: 36.9 (28 Aug 2024 13:42)  T(F): 97.5 (29 Aug 2024 12:30), Max: 98.4 (28 Aug 2024 13:42)  HR: 76 (29 Aug 2024 12:30) (76 - 97)  BP: 109/69 (29 Aug 2024 12:30) (100/63 - 119/71)  BP(mean): 79 (29 Aug 2024 12:30) (75 - 79)  RR: 18 (29 Aug 2024 12:30) (18 - 20)  SpO2: 94% (29 Aug 2024 12:30) (93% - 94%)    Parameters below as of 29 Aug 2024 12:30  Patient On (Oxygen Delivery Method): room air    PE:  Gen: Oriented, alert, No acute distress Eyes: conjunctivae and lid normal, sclera clear with no icterus ENT: nose and throat exam normal CVS: S1, S2, RRR;  no murmur, no rubs, no gallops Pulm: Good air exchange, Breath sounds equal bilaterally, no rales rhonchi,  no wheezes Chest: nontender, no chest deformity, chest movement symmetrical Gl: abdomen soft, nontender, nondistended, bowel sounds normoactive, no masses palpated Musk: no msk pain, no joint swelling Skin: no skin lesions, skin turgor normal, warm and well perfused Neuro: Awake, alert,Psych: normal affect, insight

## 2024-08-29 NOTE — DISCHARGE NOTE PROVIDER - NSDCMRMEDTOKEN_GEN_ALL_CORE_FT
azithromycin 500 mg oral tablet: 1 tab(s) orally once a day  cefpodoxime 200 mg oral tablet: 1 tab(s) orally 2 times a day  fluticasone 50 mcg/inh nasal spray: 1 spray(s) nasal every 12 hours  promethazine-dextromethorphan 6.25 mg-15 mg/5 mL oral syrup: 5 milliliter(s) orally every 6 hours as needed for  cough

## 2024-08-29 NOTE — CONSULT NOTE ADULT - ASSESSMENT
34yo man never smoker no vaping w/ PMH PE 2/2 COVID-19 who presented with dyspnea a/w cough, fevers, night sweats, diarrhea, and pleurisy since 8/24 weekend; admitted for AHRF 2/2 PNA.     #Acute hypoxemic respiratory failure  #Sepsis  #PNA, likely    CT personally reviewed. Imaging and hx suggestive of atypical bacterial vs. viral PNA with scattered branching tree-in-bud opacities in the setting of systemic fever, diarrhea, cough and dyspnea, sick contacts. CT findings may be subacute (i.e. from recent viral infection) especially in setting known sick contacts at home but with negative RVP here vs. multifactorial. No other contributory PMH or immunosuppression to suggest fungal causes. Seems to be improving so far on antimicrobial coverage for PNA, lending credence to bacterial component.     Recommendations:  - monitor off supplemental O2 at rest today, was able to wean off during interview in AM; goal SpO2=>92%  - duonebs q6h standing  - ID eval noted and appr; agree with continuing ceftriaxone and azithromycin for now, especially with atypical coverage given CT findings  - add on screening HIV testing  - consider repeating RVP  - sputum cx polymicrobial on gm stain, likely oral mirta; only rare PMNs  - strep and mycoplasma neg; f/u legionella  - add fluticasone nasal sprays for PND component of cough  - ok to add hycodan for breakthrough control of cough with mucinex  - bcx NGTD  - mobilize OOBTC and ambulate as tolerated daily  - incentive judy 10x/hr while awake

## 2024-08-29 NOTE — PROGRESS NOTE ADULT - ATTENDING COMMENTS
Chart reviewed and pt re-evaluated at the bedside in the presence of the intern on ID. Agree with the intern's Hx, PE, assessment, and plan as described above.

## 2024-08-29 NOTE — PROGRESS NOTE ADULT - SUBJECTIVE AND OBJECTIVE BOX
Subjective/Objective: No acute events overnight.  Patient examined at bedside this AM.  Patient still complains of SOB (2L NC), chest pain, productive intermittent cough and occasional loose stools.    MEDS  acetaminophen     Tablet .. 650 milliGRAM(s) Oral every 6 hours PRN  albuterol/ipratropium for Nebulization 3 milliLiter(s) Nebulizer every 6 hours  aluminum hydroxide/magnesium hydroxide/simethicone Suspension 30 milliLiter(s) Oral every 4 hours PRN  azithromycin   Tablet 500 milliGRAM(s) Oral daily (Day 2)  cefTRIAXone   IVPB 1000 milliGRAM(s) IV Intermittent every 24 hours (Day 2)  enoxaparin Injectable 40 milliGRAM(s) SubCutaneous every 24 hours  guaiFENesin ER 1200 milliGRAM(s) Oral every 12 hours  hydrocodone/homatropine Syrup 5 milliLiter(s) Oral once  melatonin 3 milliGRAM(s) Oral at bedtime PRN  ondansetron Injectable 4 milliGRAM(s) IV Push every 8 hours PRN  potassium chloride    Tablet ER 40 milliEquivalent(s) Oral every 4 hours  sodium chloride 0.9%. 1000 milliLiter(s) IV Continuous <Continuous>      PHYSICAL EXAM:    Vital Signs Last 24 Hrs  T(C): 36.8 (29 Aug 2024 05:20), Max: 36.9 (28 Aug 2024 13:42)  T(F): 98.3 (29 Aug 2024 05:20), Max: 98.4 (28 Aug 2024 13:42)  HR: 86 (29 Aug 2024 05:20) (86 - 97)  BP: 100/63 (29 Aug 2024 05:20) (100/63 - 119/71)  BP(mean): 75 (29 Aug 2024 05:20) (75 - 78)  RR: 18 (29 Aug 2024 05:20) (18 - 20)  SpO2: 93% (29 Aug 2024 05:20) (93% - 94%)    Parameters below as of 29 Aug 2024 05:20  Patient On (Oxygen Delivery Method): room air  O2 Flow (L/min): 2      Gen: NAD, Anxious disposition    HEENT: NC/AT; conj. clear; mouth--good oral dentition    Back: no CVAT    Chest/Thorax: bilateral airway entry present, no adventitious sounds auscultated    Cardiovascular: S1 S2+, no murmurs, gallops or rubs appreciated    Gastrointestinal: Generalized abdominal tenderness to deep palpation, abdomen soft, bowel sounds active    Extremities: No pedal edema, clubbing cyanosis. Peripheral pulses present.    Neurologic: AAOx3    SKIN: no rashes      LABS/DIAGNOSTIC TESTS                            11.7   6.37  )-----------( 253      ( 29 Aug 2024 06:10 )             34.6       WBC Count: 6.37 K/uL (08-29 @ 06:10)  WBC Count: 6.32 K/uL (08-28 @ 05:10)  WBC Count: 8.15 K/uL (08-27 @ 20:38)      08-29    141  |  109<H>  |  14  ----------------------------<  191<H>  3.3<L>   |  24  |  0.91    Ca    9.6      29 Aug 2024 06:10  Phos  3.9     08-29  Mg     2.5     08-29    TPro  6.5  /  Alb  2.6<L>  /  TBili  0.4  /  DBili  x   /  AST  16  /  ALT  36  /  AlkPhos  65  08-29      Urinalysis - negative        PT/INR - ( 27 Aug 2024 20:38 )   PT: 13.2 sec;   INR: 1.16 ratio         PTT - ( 27 Aug 2024 20:38 )  PTT:33.8 sec    Culture - Sputum (collected 08-28-24 @ 13:00)  Source: .Sputum Sputum  Gram Stain (08-29-24 @ 05:46):    Rare polymorphonuclear leukocytes per low power field    Rare Squamous epithelial cells per low power field    Few Gram Positive Rods seen per oil power field    Rare Gram Positive Cocci in Clusters seen per oil power field    Few Gram Negative Rods seen per oil power field    Rare Gram Positive Cocci in Pairs and Chains seen per oil power field    Urinalysis with Rflx Culture (collected 08-28-24 @ 03:20)    Culture - Blood (collected 08-27-24 @ 20:40)  Source: .Blood Blood-Peripheral  Preliminary Report (08-29-24 @ 02:02):    No growth at 24 hours    Culture - Blood (collected 08-27-24 @ 20:38)  Source: .Blood Blood-Peripheral  Preliminary Report (08-29-24 @ 02:02):    No growth at 24 hours          RADIOLOGY               Subjective/Objective: No acute events overnight.  Patient examined at bedside this AM.  Patient still complains of SOB (2L NC), chest pain, productive intermittent cough and occasional non bloody loose stools. States that nebulizer has helped with his SOB.    MEDS  acetaminophen     Tablet .. 650 milliGRAM(s) Oral every 6 hours PRN  albuterol/ipratropium for Nebulization 3 milliLiter(s) Nebulizer every 6 hours  aluminum hydroxide/magnesium hydroxide/simethicone Suspension 30 milliLiter(s) Oral every 4 hours PRN  azithromycin   Tablet 500 milliGRAM(s) Oral daily (Day 2)  cefTRIAXone   IVPB 1000 milliGRAM(s) IV Intermittent every 24 hours (Day 2)  enoxaparin Injectable 40 milliGRAM(s) SubCutaneous every 24 hours  guaiFENesin ER 1200 milliGRAM(s) Oral every 12 hours  hydrocodone/homatropine Syrup 5 milliLiter(s) Oral once  melatonin 3 milliGRAM(s) Oral at bedtime PRN  ondansetron Injectable 4 milliGRAM(s) IV Push every 8 hours PRN  potassium chloride    Tablet ER 40 milliEquivalent(s) Oral every 4 hours  sodium chloride 0.9%. 1000 milliLiter(s) IV Continuous <Continuous>      PHYSICAL EXAM:    Vital Signs Last 24 Hrs  T(C): 36.8 (29 Aug 2024 05:20), Max: 36.9 (28 Aug 2024 13:42)  T(F): 98.3 (29 Aug 2024 05:20), Max: 98.4 (28 Aug 2024 13:42)  HR: 86 (29 Aug 2024 05:20) (86 - 97)  BP: 100/63 (29 Aug 2024 05:20) (100/63 - 119/71)  BP(mean): 75 (29 Aug 2024 05:20) (75 - 78)  RR: 18 (29 Aug 2024 05:20) (18 - 20)  SpO2: 93% (29 Aug 2024 05:20) (93% - 94%)    Parameters below as of 29 Aug 2024 05:20  Patient On (Oxygen Delivery Method): room air  O2 Flow (L/min): 2      Gen: NAD, Anxious disposition    HEENT: NC/AT; conj. clear; mouth--good oral dentition    Back: no CVAT    Chest/Thorax: bilateral airway entry present, no adventitious sounds auscultated    Cardiovascular: S1 S2+, no murmurs, gallops or rubs appreciated    Gastrointestinal: Generalized abdominal tenderness to deep palpation, abdomen soft, bowel sounds active    Extremities: No pedal edema, clubbing cyanosis. Peripheral pulses present.    Neurologic: AAOx3    SKIN: no rashes      LABS/DIAGNOSTIC TESTS                            11.7   6.37  )-----------( 253      ( 29 Aug 2024 06:10 )             34.6       WBC Count: 6.37 K/uL (08-29 @ 06:10)  WBC Count: 6.32 K/uL (08-28 @ 05:10)  WBC Count: 8.15 K/uL (08-27 @ 20:38)      08-29    141  |  109<H>  |  14  ----------------------------<  191<H>  3.3<L>   |  24  |  0.91    Ca    9.6      29 Aug 2024 06:10  Phos  3.9     08-29  Mg     2.5     08-29    TPro  6.5  /  Alb  2.6<L>  /  TBili  0.4  /  DBili  x   /  AST  16  /  ALT  36  /  AlkPhos  65  08-29      Urinalysis - negative        PT/INR - ( 27 Aug 2024 20:38 )   PT: 13.2 sec;   INR: 1.16 ratio         PTT - ( 27 Aug 2024 20:38 )  PTT:33.8 sec    Culture - Sputum (collected 08-28-24 @ 13:00)  Source: .Sputum Sputum  Gram Stain (08-29-24 @ 05:46):    Rare polymorphonuclear leukocytes per low power field    Rare Squamous epithelial cells per low power field    Few Gram Positive Rods seen per oil power field    Rare Gram Positive Cocci in Clusters seen per oil power field    Few Gram Negative Rods seen per oil power field    Rare Gram Positive Cocci in Pairs and Chains seen per oil power field    Urinalysis with Rflx Culture (collected 08-28-24 @ 03:20)    Culture - Blood (collected 08-27-24 @ 20:40)  Source: .Blood Blood-Peripheral  Preliminary Report (08-29-24 @ 02:02):    No growth at 24 hours    Culture - Blood (collected 08-27-24 @ 20:38)  Source: .Blood Blood-Peripheral  Preliminary Report (08-29-24 @ 02:02):    No growth at 24 hours               Subjective/Objective: No acute events overnight.  Patient examined at bedside this AM.  Patient still complains of SOB (2L NC), chest pain, productive intermittent cough and occasional non bloody loose stools. States that nebulizer has helped with his SOB.    MEDS  acetaminophen     Tablet .. 650 milliGRAM(s) Oral every 6 hours PRN  albuterol/ipratropium for Nebulization 3 milliLiter(s) Nebulizer every 6 hours  aluminum hydroxide/magnesium hydroxide/simethicone Suspension 30 milliLiter(s) Oral every 4 hours PRN  azithromycin   Tablet 500 milliGRAM(s) Oral daily (Day 2)  cefTRIAXone   IVPB 1000 milliGRAM(s) IV Intermittent every 24 hours (Day 2)  enoxaparin Injectable 40 milliGRAM(s) SubCutaneous every 24 hours  guaiFENesin ER 1200 milliGRAM(s) Oral every 12 hours  hydrocodone/homatropine Syrup 5 milliLiter(s) Oral once  melatonin 3 milliGRAM(s) Oral at bedtime PRN  ondansetron Injectable 4 milliGRAM(s) IV Push every 8 hours PRN  potassium chloride    Tablet ER 40 milliEquivalent(s) Oral every 4 hours  sodium chloride 0.9%. 1000 milliLiter(s) IV Continuous <Continuous>      PHYSICAL EXAM:    Vital Signs Last 24 Hrs  T(C): 36.8 (29 Aug 2024 05:20), Max: 36.9 (28 Aug 2024 13:42)  T(F): 98.3 (29 Aug 2024 05:20), Max: 98.4 (28 Aug 2024 13:42)  HR: 86 (29 Aug 2024 05:20) (86 - 97)  BP: 100/63 (29 Aug 2024 05:20) (100/63 - 119/71)  BP(mean): 75 (29 Aug 2024 05:20) (75 - 78)  RR: 18 (29 Aug 2024 05:20) (18 - 20)  SpO2: 93% (29 Aug 2024 05:20) (93% - 94%)    Parameters below as of 29 Aug 2024 05:20  Patient On (Oxygen Delivery Method): room air  O2 Flow (L/min): 2    02 sat after walkin% R.A.      Gen: NAD, Anxious disposition    HEENT: NC/AT; conj. clear; mouth--good oral dentition    Back: no CVAT    Chest/Thorax: bilateral airway entry present, no adventitious sounds auscultated; no rales or wheezes    Cardiovascular: S1 S2+, no murmurs, gallops or rubs appreciated    Gastrointestinal: Generalized abdominal tenderness to deep palpation, abdomen soft, bowel sounds active    Extremities: No pedal edema, clubbing cyanosis. Peripheral pulses present.    Neurologic: AAOx3    SKIN: no rashes      LABS/DIAGNOSTIC TESTS                          11.7   6.37  )-----------( 253      ( 29 Aug 2024 06:10 )             34.6       WBC Count: 6.37 K/uL ( @ 06:10)  WBC Count: 6.32 K/uL ( @ 05:10)  WBC Count: 8.15 K/uL ( @ 20:38)          141  |  109<H>  |  14  ----------------------------<  191<H>  3.3<L>   |  24  |  0.91    Ca    9.6      29 Aug 2024 06:10  Phos  3.9       Mg     2.5         TPro  6.5  /  Alb  2.6<L>  /  TBili  0.4  /  DBili  x   /  AST  16  /  ALT  36  /  AlkPhos  65        Urinalysis - negative        PT/INR - ( 27 Aug 2024 20:38 )   PT: 13.2 sec;   INR: 1.16 ratio         PTT - ( 27 Aug 2024 20:38 )  PTT:33.8 sec    Culture - Sputum (collected 24 @ 13:00)  Source: .Sputum Sputum  Gram Stain (24 @ 05:46):    Rare polymorphonuclear leukocytes per low power field    Rare Squamous epithelial cells per low power field    Few Gram Positive Rods seen per oil power field    Rare Gram Positive Cocci in Clusters seen per oil power field    Few Gram Negative Rods seen per oil power field    Rare Gram Positive Cocci in Pairs and Chains seen per oil power field    Urinalysis with Rflx Culture (collected 24 @ 03:20)    Culture - Blood (collected 24 @ 20:40)  Source: .Blood Blood-Peripheral  Preliminary Report (24 @ 02:02):    No growth at 24 hours    Culture - Blood (collected 24 @ 20:38)  Source: .Blood Blood-Peripheral  Preliminary Report (24 @ 02:02):    No growth at 24 hours    GI PCR Panel Stool (24 @ 16:00)   GI PCR Panel: NotDetec

## 2024-08-29 NOTE — DISCHARGE NOTE PROVIDER - HOSPITAL COURSE
34 yo M, from home, with pmhx of covid c/b PE (2020), and IBS who presented with SOB, fever, cough, and pleuritic chest pain associated with diarrhea. Pt went to urgent care and received Levaquin PO but was having worsening symptoms so came to ED. Met sepsis criteria on admission with tachycardia, febrile, tachypnea and hypoxic. CXR negative. CTA chest with no PE but showing scattered branching "tree in bud" and ground-glass nodular opacities L>R. Pt admitted for sepsis 2/2 PNA. C diff, GI PCR negative, GI followed. Pt is poor historian but says he was told he was ?IBS. No bloody BM reported. Pulm followed for hypoxia and coguh, given duonebs and mucinex. Pt also started on hycodan for better cough control. ID followed for PNA, completed 3 doses of azithromycin today. To complete abx course of cefpodoxime at home for 3 more days and azithromycin for 2 more days    Pt on room air, saturating 95%, encouraged ambulation.    Pt is medically optimized for discharge, discussed with the attending Dr De La Torre  This is just a brief hospital course, please refer to the progress notes for detailed information

## 2024-08-30 LAB
CULTURE RESULTS: ABNORMAL
CULTURE RESULTS: SIGNIFICANT CHANGE UP
HIV 1+2 AB+HIV1 P24 AG SERPL QL IA: SIGNIFICANT CHANGE UP
LEGIONELLA AB SER-ACNC: SIGNIFICANT CHANGE UP
SPECIMEN SOURCE: SIGNIFICANT CHANGE UP
SPECIMEN SOURCE: SIGNIFICANT CHANGE UP

## 2024-09-01 LAB — CALPROTECTIN STL-MCNT: 37 UG/G — SIGNIFICANT CHANGE UP (ref 0–120)

## 2024-09-02 LAB
CULTURE RESULTS: SIGNIFICANT CHANGE UP
CULTURE RESULTS: SIGNIFICANT CHANGE UP
SPECIMEN SOURCE: SIGNIFICANT CHANGE UP
SPECIMEN SOURCE: SIGNIFICANT CHANGE UP

## 2024-09-03 LAB
CULTURE RESULTS: SIGNIFICANT CHANGE UP
SPECIMEN SOURCE: SIGNIFICANT CHANGE UP

## 2025-01-17 NOTE — DISCHARGE NOTE PROVIDER - CARE PROVIDERS DIRECT ADDRESSES
Medical Record, RN, mother at bedside/family/significant other/other (specify) ,DirectAddress_Unknown,[email or web address]

## 2025-03-08 ENCOUNTER — NON-APPOINTMENT (OUTPATIENT)
Age: 35
End: 2025-03-08
